# Patient Record
Sex: MALE | Race: WHITE | Employment: UNEMPLOYED | URBAN - METROPOLITAN AREA
[De-identification: names, ages, dates, MRNs, and addresses within clinical notes are randomized per-mention and may not be internally consistent; named-entity substitution may affect disease eponyms.]

---

## 2017-01-04 ENCOUNTER — OFFICE VISIT (OUTPATIENT)
Dept: FAMILY MEDICINE CLINIC | Age: 52
End: 2017-01-04

## 2017-01-04 VITALS
TEMPERATURE: 97.7 F | HEART RATE: 96 BPM | DIASTOLIC BLOOD PRESSURE: 84 MMHG | HEIGHT: 68 IN | WEIGHT: 280 LBS | BODY MASS INDEX: 42.44 KG/M2 | SYSTOLIC BLOOD PRESSURE: 132 MMHG | RESPIRATION RATE: 16 BRPM

## 2017-01-04 DIAGNOSIS — M54.50 CHRONIC LEFT-SIDED LOW BACK PAIN WITHOUT SCIATICA: Primary | ICD-10-CM

## 2017-01-04 DIAGNOSIS — H92.01 EAR PAIN, RIGHT: ICD-10-CM

## 2017-01-04 DIAGNOSIS — G89.29 CHRONIC LEFT-SIDED LOW BACK PAIN WITHOUT SCIATICA: Primary | ICD-10-CM

## 2017-01-04 RX ORDER — OXYCODONE AND ACETAMINOPHEN 5; 325 MG/1; MG/1
1 TABLET ORAL
Qty: 21 TAB | Refills: 0 | Status: SHIPPED | OUTPATIENT
Start: 2017-01-04 | End: 2019-09-05

## 2017-01-04 NOTE — PATIENT INSTRUCTIONS
Back Care and Preventing Injuries: Care Instructions  Your Care Instructions  You can hurt your back doing many everyday activities: lifting a heavy box, bending down to garden, exercising at the gym, and even getting out of bed. But you can keep your back strong and healthy by doing some exercises. You also can follow a few tips for sitting, sleeping, and lifting to avoid hurting your back again. Talk to your doctor before you start an exercise program. Ask for help if you want to learn more about keeping your back healthy. Follow-up care is a key part of your treatment and safety. Be sure to make and go to all appointments, and call your doctor if you are having problems. It's also a good idea to know your test results and keep a list of the medicines you take. How can you care for yourself at home? · Stay at a healthy weight to avoid strain on your lower back. · Do not smoke. Smoking increases the risk of osteoporosis, which weakens the spine. If you need help quitting, talk to your doctor about stop-smoking programs and medicines. These can increase your chances of quitting for good. · Make sure you sleep in a position that maintains your back's normal curves and on a mattress that feels comfortable. Sleep on your side with a pillow between your knees, or sleep on your back with a pillow under your knees. These positions can reduce strain on your back. · When you get out of bed, lie on your side and bend both knees. Drop your feet over the edge of the bed as you push up with both arms. Scoot to the edge of the bed. Make sure your feet are in line with your rear end (buttocks), and then stand up. · If you must stand for a long time, put one foot on a stool, ledge, or box. Exercise to strengthen your back and other muscles  · Get at least 30 minutes of exercise on most days of the week. Walking is a good choice.  You also may want to do other activities, such as running, swimming, cycling, or playing tennis or team sports. · Stretch your back muscles. Here are few exercises to try:  [employer]ali [employer] on your back with your knees bent and your feet flat on the floor. Gently pull one bent knee to your chest. Put that foot back on the floor, and then pull the other knee to your chest. Hold for 15 to 30 seconds. Repeat 2 to 4 times. ¨ Do pelvic tilts. Lie on your back with your knees bent. Tighten your stomach muscles. Pull your belly button (navel) in and up toward your ribs. You should feel like your back is pressing to the floor and your hips and pelvis are slightly lifting off the floor. Hold for 6 seconds while breathing smoothly. · Keep your core muscles strong. The muscles of your back, belly (abdomen), and buttocks support your spine. ¨ Pull in your belly, and imagine pulling your navel toward your spine. Hold this for 6 seconds, then relax. Remember to keep breathing normally as you tense your muscles. ¨ Do curl-ups. Always do them with your knees bent. Keep your low back on the floor, and curl your shoulders toward your knees using a smooth, slow motion. Keep your arms folded across your chest. If this bothers your neck, try putting your hands behind your neck (not your head), with your elbows spread apart. ¨ Lie on your back with your knees bent and your feet flat on the floor. Tighten your belly muscles, and then push with your feet and raise your buttocks up a few inches. Hold this position 6 seconds as you continue to breathe normally, then lower yourself slowly to the floor. Repeat 8 to 12 times. ¨ If you like group exercise, try Pilates or yoga. These classes have poses that strengthen the core muscles. Protect your back when you sit  · Place a small pillow, a rolled-up towel, or a lumbar roll in the curve of your back if you need extra support. · Sit in a chair that is low enough to let you place both feet flat on the floor with both knees nearly level with your hips.  If your chair or desk is too high, use a foot rest to raise your knees. · When driving, keep your knees nearly level with your hips. Sit straight, and drive with both hands on the steering wheel. Your arms should be in a slightly bent position. · Try a kneeling chair, which helps tilt your hips forward. This takes pressure off your lower back. · Try sitting on an exercise ball. It can rock from side to side, which helps keep your back loose. Lift properly  · Squat down, bending at the hips and knees only. If you need to, put one knee to the floor and extend your other knee in front of you, bent at a right angle (half kneeling). · Press your chest straight forward. This helps keep your upper back straight while keeping a slight arch in your low back. · Hold the load as close to your body as possible, at the level of your navel. · Use your feet to change direction, taking small steps. · Lead with your hips as you change direction. Keep your shoulders in line with your hips as you move. Do not twist your body. · Set down your load carefully, squatting with your knees and hips only. When should you call for help? Watch closely for changes in your health, and be sure to contact your doctor if:  · You want more exercises to make your back and other core muscles stronger. Where can you learn more? Go to http://andreia-liliane.info/. Enter S810 in the search box to learn more about \"Back Care and Preventing Injuries: Care Instructions. \"  Current as of: May 23, 2016  Content Version: 11.1  © 5451-8518 [employer]. Care instructions adapted under license by [employer] (which disclaims liability or warranty for this information). If you have questions about a medical condition or this instruction, always ask your healthcare professional. [address] any warranty or liability for your use of this information.        Earache: Care Instructions  Your Care Instructions  Even though infection is a common cause of ear pain, not all ear pain means an infection. If you have ear pain and don't have an infection, it could be because of a jaw problem, such as temporomandibular joint (TMJ) pain. Or it could be because of a neck problem. When ear discomfort or pain is mild or comes and goes without other symptoms, home treatment may be all you need. Follow-up care is a key part of your treatment and safety. Be sure to make and go to all appointments, and call your doctor if you are having problems. It's also a good idea to know your test results and keep a list of the medicines you take. How can you care for yourself at home? · Apply heat on the ear to ease pain. To apply heat, put a warm water bottle, a heating pad set on low, or a warm cloth on your ear. Do not go to sleep with a heating pad on your skin. · Take an over-the-counter pain medicine, such as acetaminophen (Tylenol), ibuprofen (Advil, Motrin), or naproxen (Aleve). Be safe with medicines. Read and follow all instructions on the label. · Do not take two or more pain medicines at the same time unless the doctor told you to. Many pain medicines have acetaminophen, which is Tylenol. Too much acetaminophen (Tylenol) can be harmful. · Never insert anything, such as a cotton swab or a gee pin, into the ear. When should you call for help? Call your doctor now or seek immediate medical care if:  · You have a fever. · You feel a lump in your neck or jaw. · The area around the ear starts to swell. · There is pus or blood draining from the ear. · There is new or different drainage from the ear. Watch closely for changes in your health, and be sure to contact your doctor if:  · Your pain gets worse. · You do not get better as expected. Where can you learn more? Go to http://andreia-liliane.info/. Enter G736 in the search box to learn more about \"Earache: Care Instructions. \"  Current as of: July 29, 2016  Content Version: 11.1  © 8085-9023 [employer]. Care instructions adapted under license by [employer] (which disclaims liability or warranty for this information). If you have questions about a medical condition or this instruction, always ask your healthcare professional. [address] any warranty or liability for your use of this information.

## 2017-01-04 NOTE — PROGRESS NOTES
Chief Complaint   Patient presents with    Ear Pain    Back Pain           HPI:     A 55 y/o male patient presents with complaints of mild to moderate right ear pain x 6 months, on and off. Denies ear trauma, recent travels, swimming, diving. Denies ear discharge, tinnitus, headache, dizziness, sore throat, dental problem, TMJ involvement, jaw pain. Also states left sided low back back pain has not completely gone away. NSAIDs not helping. States Percocet helped with the pain. Wants pain management referral. Was seen by orthopedic surgeon and was told that the left hip prosthesis is doing well and that the left low back pain is not related to the surgery. Patient states aggravation of pain when sitting down. States walking also aggravates pain. Denies pain radiation, numbness, tingling sensation. ROS:    Negative except that mentioned in HPI.     Physical Exam:    Vital Signs:   Visit Vitals    /84    Pulse 96    Temp 97.7 °F (36.5 °C) (Oral)    Resp 16    Ht 5' 8\" (1.727 m)    Wt 280 lb (127 kg)    BMI 42.57 kg/m2         Constitutional: a, a & o x 3, uncomfortable, no acute distress, interacting appropriately  HEENT: Head normocephalic, atraumatic, no conjuctival pallor or erythema, PERRLA, EOMI, ear canals clear with no erythema, swelling, excoriation or excessive cerumen, TM intact and non-bulging, no sinus tenderness, pharynx and tonsils with no erythema, no exudates, no tonsillar swelling  Mouth: No gum swelling, no dental abscess noted, mouth opening normal  Face: no facial swelling   Neck: supple, symmetrical, no palpable mass, no thyromegaly  Respiratory: symmetrical chest expansion, lungs clear to auscultation bilaterally, no wheezes or crackles  CV: capillary refill < 2 sec, normal S1S2, regular rate and rhythm, no murmurs, no carotid bruits, no JVD, pulses palpable  Musculoskeletal: no palpable tenderness on right TMJ, no palpable tenderness on lumbar spine, no perilumbar tenderness, palpable tenderness on the left iliac crest, guarded gait    Skin: no pallor, warm and dry, no rashes, no bruises,  Lymph: no cervical lymphadenopathy      Assessment/Plan:    1. Chronic left-sided low back pain without sciatica    - REFERRAL TO PAIN MANAGEMENT  - oxyCODONE-acetaminophen (PERCOCET) 5-325 mg per tablet; Take 1 Tab by mouth every eight (8) hours as needed for Pain. Max Daily Amount: 3 Tabs. Dispense: 21 Tab; Refill: 0  - Informed patient that this is the last prescription for Percocet. Discussed risk of dependency. Will defer to pain management. 2. Ear pain, right    - no obvious or visible abnormality  - REFERRAL TO ENT-OTOLARYNGOLOGY    Additional Notes: Discussed today's diagnosis and treatment plans. Medication indications and adverse effects discussed. After Visit Summary: Provided and discussed printed patient instructions. Questions answered. Follow-up Disposition:  Return if symptoms worsen or fail to improve. Mabel Sarkar, ANP-BC  Adult Medicine  [facility]

## 2017-01-04 NOTE — PROGRESS NOTES
1. Have you been to the ER, urgent care clinic since your last visit? Hospitalized since your last visit? No    2. Have you seen or consulted any other health care providers outside of the [address] [place] [address] since your last visit? Include any pap smears or colon screening.  No

## 2017-01-04 NOTE — MR AVS SNAPSHOT
Visit Information Date & Time Provider Department Dept. Phone Encounter #  
 1/4/2017  4:45 PM [facility] [phone] [id number] Follow-up Instructions Return if symptoms worsen or fail to improve. Your Appointments 1/30/2017  8:30 AM  
Follow Up with Mabel Sarkar, ALEC Kiran 23 ([facility]) Appt Note: 3 mo f/u  
 [address] [facility] [address]  
  
   
 [address]  
  
    
 3/21/2017  8:30 AM  
XRAY Visit with Luiz [facility]. Ilan Jesus 98 ([facility]) Appt Note: Return in about 6 months (around 3/20/2017) for KUB prior . [address] [facility] [address] [facility] [id number]  
  
    
 3/21/2017  8:45 AM  
ESTABLISHED PATIENT with Theresa Serrato MD  
[facility]. Ilan Jesus 98 [facility]) Appt Note: Return in about 6 months (around 3/20/2017) for KUB prior . [address] [facility] [address]  
[phone]  
  
   
 [facility] [id number] Upcoming Health Maintenance Date Due  
 EYE EXAM RETINAL OR DILATED Q1 1/23/1977 DTaP/Tdap/Td series (1 - Tdap) 5/12/2012 LIPID PANEL Q1 12/17/2016 HEMOGLOBIN A1C Q6M 1/21/2017 MICROALBUMIN Q1 2/8/2017 FOOT EXAM Q1 10/28/2017 Allergies as of 1/4/2017  Review Complete On: 1/4/2017 By: Thierry Braga LPN Severity Noted Reaction Type Reactions Medrol [Methylprednisolone] High 01/20/2016    Anaphylaxis Other reaction(s): other/intolerance 
tachycardia Patient states he had a severe allergic reaction about 15 years ago. Hydrocodone-acetaminophen Low 03/25/2016    Rash Current Immunizations  Reviewed on 1/4/2017 Name Date Influenza Vaccine 12/13/2015 Influenza Vaccine (Quad) PF 11/11/2016 Pneumococcal Polysaccharide (PPSV-23) 1/1/2016 Td 5/11/2012 Reviewed by Aundrea Rivers LPN on 9/8/6243 at  0:49 PM  
You Were Diagnosed With   
  
 Codes Comments Chronic left-sided low back pain without sciatica    -  Primary ICD-10-CM: M54.5, G89.29 ICD-9-CM: 724.2, 338.29 Ear pain, right     ICD-10-CM: H92.01 
ICD-9-CM: 388.70 Vitals BP Pulse Temp Resp Height(growth percentile) Weight(growth percentile) 132/84 96 97.7 °F (36.5 °C) (Oral) 16 5' 8\" (1.727 m) 280 lb (127 kg) BMI Smoking Status 42.57 kg/m2 Never Smoker Vitals History BMI and BSA Data Body Mass Index Body Surface Area 42.57 kg/m 2 2.47 m 2 Preferred Pharmacy Pharmacy Name Phone [id number], [address] [place] [address] [phone] Your Updated Medication List  
  
   
This list is accurate as of: 1/4/17  5:19 PM.  Always use your most recent med list.  
  
  
  
  
 aspirin 81 mg chewable tablet Take 1 Tab by mouth daily. celecoxib 200 mg capsule Commonly known as:  CELEBREX Take 1 Cap by mouth daily as needed. cyclobenzaprine 10 mg tablet Commonly known as:  FLEXERIL Take 1 Tab by mouth three (3) times daily as needed for Muscle Spasm(s). Indications: MUSCLE SPASM  
  
 lisinopril 10 mg tablet Commonly known as:  Doug Economy Take 1 Tab by mouth daily. LORazepam 0.5 mg tablet Commonly known as:  ATIVAN Take 1 Tab by mouth every eight (8) hours as needed for Anxiety. Max Daily Amount: 1.5 mg. Indications: ANXIETY  
  
 meloxicam 7.5 mg tablet Commonly known as:  MOBIC Take 1 Tab by mouth nightly as needed. oxyCODONE-acetaminophen 5-325 mg per tablet Commonly known as:  PERCOCET Take 1 Tab by mouth every eight (8) hours as needed for Pain. Max Daily Amount: 3 Tabs. VOLTAREN 1 % Gel Generic drug:  diclofenac Apply  to affected area every six (6) hours. Prescriptions Printed Refills  
 oxyCODONE-acetaminophen (PERCOCET) 5-325 mg per tablet 0 Sig: Take 1 Tab by mouth every eight (8) hours as needed for Pain. Max Daily Amount: 3 Tabs. Class: Print Route: Oral  
  
Follow-up Instructions Return if symptoms worsen or fail to improve. Referral Information Referral ID Referred By Referred To  
  
 [id number] Chrissywinston MOMIN Not Available Visits Status Start Date End Date 1 New Request 1/4/17 1/4/18 If your referral has a status of pending review or denied, additional information will be sent to support the outcome of this decision. Referral ID Referred By Referred To  
 [id number] Chrissywinston Humphrey LILIANE Not Available Visits Status Start Date End Date 1 New Request 1/4/17 1/4/18 If your referral has a status of pending review or denied, additional information will be sent to support the outcome of this decision. Patient Instructions Back Care and Preventing Injuries: Care Instructions Your Care Instructions You can hurt your back doing many everyday activities: lifting a heavy box, bending down to garden, exercising at the gym, and even getting out of bed. But you can keep your back strong and healthy by doing some exercises. You also can follow a few tips for sitting, sleeping, and lifting to avoid hurting your back again. Talk to your doctor before you start an exercise program. Ask for help if you want to learn more about keeping your back healthy. Follow-up care is a key part of your treatment and safety. Be sure to make and go to all appointments, and call your doctor if you are having problems. It's also a good idea to know your test results and keep a list of the medicines you take. How can you care for yourself at home? · Stay at a healthy weight to avoid strain on your lower back. · Do not smoke.  Smoking increases the risk of osteoporosis, which weakens the spine. If you need help quitting, talk to your doctor about stop-smoking programs and medicines. These can increase your chances of quitting for good. · Make sure you sleep in a position that maintains your back's normal curves and on a mattress that feels comfortable. Sleep on your side with a pillow between your knees, or sleep on your back with a pillow under your knees. These positions can reduce strain on your back. · When you get out of bed, lie on your side and bend both knees. Drop your feet over the edge of the bed as you push up with both arms. Scoot to the edge of the bed. Make sure your feet are in line with your rear end (buttocks), and then stand up. · If you must stand for a long time, put one foot on a stool, ledge, or box. Exercise to strengthen your back and other muscles · Get at least 30 minutes of exercise on most days of the week. Walking is a good choice. You also may want to do other activities, such as running, swimming, cycling, or playing tennis or team sports. · Stretch your back muscles. Here are few exercises to try: ¨ Lie on your back with your knees bent and your feet flat on the floor. Gently pull one bent knee to your chest. Put that foot back on the floor, and then pull the other knee to your chest. Hold for 15 to 30 seconds. Repeat 2 to 4 times. ¨ Do pelvic tilts. Lie on your back with your knees bent. Tighten your stomach muscles. Pull your belly button (navel) in and up toward your ribs. You should feel like your back is pressing to the floor and your hips and pelvis are slightly lifting off the floor. Hold for 6 seconds while breathing smoothly. · Keep your core muscles strong. The muscles of your back, belly (abdomen), and buttocks support your spine. ¨ Pull in your belly, and imagine pulling your navel toward your spine. Hold this for 6 seconds, then relax. Remember to keep breathing normally as you tense your muscles. ¨ Do curl-ups. Always do them with your knees bent. Keep your low back on the floor, and curl your shoulders toward your knees using a smooth, slow motion. Keep your arms folded across your chest. If this bothers your neck, try putting your hands behind your neck (not your head), with your elbows spread apart. ¨ Lie on your back with your knees bent and your feet flat on the floor. Tighten your belly muscles, and then push with your feet and raise your buttocks up a few inches. Hold this position 6 seconds as you continue to breathe normally, then lower yourself slowly to the floor. Repeat 8 to 12 times. ¨ If you like group exercise, try Pilates or yoga. These classes have poses that strengthen the core muscles. Protect your back when you sit · Place a small pillow, a rolled-up towel, or a lumbar roll in the curve of your back if you need extra support. · Sit in a chair that is low enough to let you place both feet flat on the floor with both knees nearly level with your hips. If your chair or desk is too high, use a foot rest to raise your knees. · When driving, keep your knees nearly level with your hips. Sit straight, and drive with both hands on the steering wheel. Your arms should be in a slightly bent position. · Try a kneeling chair, which helps tilt your hips forward. This takes pressure off your lower back. · Try sitting on an exercise ball. It can rock from side to side, which helps keep your back loose. Lift properly · Squat down, bending at the hips and knees only. If you need to, put one knee to the floor and extend your other knee in front of you, bent at a right angle (half kneeling). · Press your chest straight forward. This helps keep your upper back straight while keeping a slight arch in your low back. · Hold the load as close to your body as possible, at the level of your navel. · Use your feet to change direction, taking small steps. · Lead with your hips as you change direction. Keep your shoulders in line with your hips as you move. Do not twist your body. · Set down your load carefully, squatting with your knees and hips only. When should you call for help? Watch closely for changes in your health, and be sure to contact your doctor if: 
· You want more exercises to make your back and other core muscles stronger. Where can you learn more? Go to http://andreia-liliane.info/. Enter S810 in the search box to learn more about \"Back Care and Preventing Injuries: Care Instructions. \" Current as of: May 23, 2016 Content Version: 11.1 © 1795-2614 [employer]. Care instructions adapted under license by [employer] (which disclaims liability or warranty for this information). If you have questions about a medical condition or this instruction, always ask your healthcare professional. [place] [address] any warranty or liability for your use of this information. Earache: Care Instructions Your Care Instructions Even though infection is a common cause of ear pain, not all ear pain means an infection. If you have ear pain and don't have an infection, it could be because of a jaw problem, such as temporomandibular joint (TMJ) pain. Or it could be because of a neck problem. When ear discomfort or pain is mild or comes and goes without other symptoms, home treatment may be all you need. Follow-up care is a key part of your treatment and safety. Be sure to make and go to all appointments, and call your doctor if you are having problems. It's also a good idea to know your test results and keep a list of the medicines you take. How can you care for yourself at home? · Apply heat on the ear to ease pain. To apply heat, put a warm water bottle, a heating pad set on low, or a warm cloth on your ear. Do not go to sleep with a heating pad on your skin. · Take an over-the-counter pain medicine, such as acetaminophen (Tylenol), ibuprofen (Advil, Motrin), or naproxen (Aleve). Be safe with medicines. Read and follow all instructions on the label. · Do not take two or more pain medicines at the same time unless the doctor told you to. Many pain medicines have acetaminophen, which is Tylenol. Too much acetaminophen (Tylenol) can be harmful. · Never insert anything, such as a cotton swab or a gee pin, into the ear. When should you call for help? Call your doctor now or seek immediate medical care if: 
· You have a fever. · You feel a lump in your neck or jaw. · The area around the ear starts to swell. · There is pus or blood draining from the ear. · There is new or different drainage from the ear. Watch closely for changes in your health, and be sure to contact your doctor if: 
· Your pain gets worse. · You do not get better as expected. Where can you learn more? Go to http://andreia-liliane.info/. Enter X944 in the search box to learn more about \"Earache: Care Instructions. \" Current as of: July 29, 2016 Content Version: 11.1 © 6233-9718 [employer]. Care instructions adapted under license by [employer] (which disclaims liability or warranty for this information). If you have questions about a medical condition or this instruction, always ask your healthcare professional. [place] [address] any warranty or liability for your use of this information. Introducing [facility] [place] [facility]! Dear Nasreen Avilez: Thank you for requesting a iovation account. Our records indicate that you already have an active iovation account. You can access your account anytime at [email or web address] Did you know that you can access your hospital and ER discharge instructions at any time in iovation? You can also review all of your test results from your hospital stay or ER visit. Additional Information If you have questions, please visit the Frequently Asked Questions section of the AppRedeemt website at [email or web address] Remember, ALOSKO is NOT to be used for urgent needs. For medical emergencies, dial 911. Now available from your iPhone and Android! Please provide this summary of care documentation to your next provider. Your primary care clinician is listed as Colin Donahue. If you have any questions after today's visit, please call [phone].

## 2017-02-17 ENCOUNTER — OFFICE VISIT (OUTPATIENT)
Dept: FAMILY MEDICINE CLINIC | Age: 52
End: 2017-02-17

## 2017-02-17 VITALS
BODY MASS INDEX: 41.52 KG/M2 | HEART RATE: 100 BPM | HEIGHT: 68 IN | WEIGHT: 274 LBS | SYSTOLIC BLOOD PRESSURE: 109 MMHG | RESPIRATION RATE: 16 BRPM | DIASTOLIC BLOOD PRESSURE: 88 MMHG | TEMPERATURE: 96 F

## 2017-02-17 DIAGNOSIS — F41.9 ANXIETY: ICD-10-CM

## 2017-02-17 DIAGNOSIS — K52.9 GASTROENTERITIS: Primary | ICD-10-CM

## 2017-02-17 RX ORDER — LORAZEPAM 0.5 MG/1
0.5 TABLET ORAL
Qty: 30 TAB | Refills: 1 | Status: SHIPPED | OUTPATIENT
Start: 2017-02-17 | End: 2019-08-23 | Stop reason: ALTCHOICE

## 2017-02-17 RX ORDER — GABAPENTIN 300 MG/1
300 CAPSULE ORAL 3 TIMES DAILY
COMMUNITY
Start: 2017-01-31 | End: 2017-12-18 | Stop reason: SDUPTHER

## 2017-02-17 RX ORDER — LOPERAMIDE HCL 2 MG
2 TABLET ORAL
Qty: 20 TAB | Refills: 0 | Status: SHIPPED | OUTPATIENT
Start: 2017-02-17 | End: 2017-02-27

## 2017-02-17 NOTE — TELEPHONE ENCOUNTER
Patient here now seeing Steven Jauregui for an acute appointment.     Last appt was 10-28-16  Next appt is None shceduled

## 2017-02-17 NOTE — MR AVS SNAPSHOT
Visit Information Date & Time Provider Department Dept. Phone Encounter #  
 2/17/2017  9:45 AM Jay Craig [id number] Your Appointments 3/21/2017  8:30 AM  
XRAY Visit with Radhames Jansen [facility]. Ilan Jesus [address] ([facility]) Appt Note: Return in about 6 months (around 3/20/2017) for KUB prior . [address]  
  
   
 [facility] [id number]  
  
    
 3/21/2017  8:45 AM  
ESTABLISHED PATIENT with Navya Shaw MD  
[facility]. Ilan Jesus 98 [facility]) Appt Note: Return in about 6 months (around 3/20/2017) for KUB prior . [address] [address]  
[phone]  
  
   
 [place] [id number] Upcoming Health Maintenance Date Due  
 EYE EXAM RETINAL OR DILATED Q1 1/23/1977 DTaP/Tdap/Td series (1 - Tdap) 5/12/2012 LIPID PANEL Q1 12/17/2016 HEMOGLOBIN A1C Q6M 1/21/2017 FOBT Q 1 YEAR AGE 50-75 1/23/2017 MICROALBUMIN Q1 2/8/2017 FOOT EXAM Q1 10/28/2017 Allergies as of 2/17/2017  Review Complete On: 2/17/2017 By: Faby Dela Cruz LPN Severity Noted Reaction Type Reactions Medrol [Methylprednisolone] High 08/07/2012    Anaphylaxis, Other (comments)  
 tachycardia Other reaction(s): other/intolerance 
tachycardia Patient states he had a severe allergic reaction about 15 years ago. Hydrocodone-acetaminophen Low 08/07/2012    Rash Current Immunizations  Reviewed on 2/17/2017 Name Date Influenza Vaccine 12/13/2015 Influenza Vaccine (Quad) PF 11/11/2016 Pneumococcal Polysaccharide (PPSV-23) 1/1/2016 Td 5/11/2012 Reviewed by Faby Dela Cruz LPN on 2/96/9415 at 61:04 AM  
You Were Diagnosed With   
  
 Codes Comments Gastroenteritis    -  Primary ICD-10-CM: K52.9 ICD-9-CM: 558. 9 Vitals BP Pulse Temp Resp Height(growth percentile) Weight(growth percentile) 109/88 100 96 °F (35.6 °C) (Oral) 16 5' 8\" (1.727 m) 274 lb (124.3 kg) BMI Smoking Status 41.66 kg/m2 Never Smoker Vitals History BMI and BSA Data Body Mass Index Body Surface Area  
 41.66 kg/m 2 2.44 m 2 Preferred Pharmacy Pharmacy Name Phone [facility], [address] [place] [address] [phone] Your Updated Medication List  
  
   
This list is accurate as of: 2/17/17 10:29 AM.  Always use your most recent med list.  
  
  
  
  
 aspirin 81 mg chewable tablet Take 1 Tab by mouth daily. celecoxib 200 mg capsule Commonly known as:  CELEBREX Take 1 Cap by mouth daily as needed. cyclobenzaprine 10 mg tablet Commonly known as:  FLEXERIL Take 1 Tab by mouth three (3) times daily as needed for Muscle Spasm(s). Indications: MUSCLE SPASM  
  
 gabapentin 300 mg capsule Commonly known as:  NEURONTIN Take 300 mg by mouth three (3) times daily. lisinopril 10 mg tablet Commonly known as:  Peguero Edman Take 1 Tab by mouth daily. loperamide 2 mg tablet Commonly known as:  IMODIUM A-D Take 1 Tab by mouth four (4) times daily as needed for Diarrhea for up to 10 days. LORazepam 0.5 mg tablet Commonly known as:  ATIVAN Take 1 Tab by mouth every eight (8) hours as needed for Anxiety. Max Daily Amount: 1.5 mg. Indications: ANXIETY  
  
 meloxicam 7.5 mg tablet Commonly known as:  MOBIC Take 1 Tab by mouth nightly as needed. oxyCODONE-acetaminophen 5-325 mg per tablet Commonly known as:  PERCOCET Take 1 Tab by mouth every eight (8) hours as needed for Pain. Max Daily Amount: 3 Tabs. VOLTAREN 1 % Gel Generic drug:  diclofenac Apply  to affected area every six (6) hours. Prescriptions Sent to Pharmacy  Refills  
 loperamide (IMODIUM A-D) 2 mg tablet 0  
 Sig: Take 1 Tab by mouth four (4) times daily as needed for Diarrhea for up to 10 days. Class: Normal  
 Pharmacy: [facility], [address] [place] [address] #: [phone] Route: Oral  
  
Patient Instructions Gastroenteritis: Care Instructions Your Care Instructions Gastroenteritis is an illness that may cause nausea, vomiting, and diarrhea. It is sometimes called \"stomach flu. \" It can be caused by bacteria or a virus. You will probably begin to feel better in 1 to 2 days. In the meantime, get plenty of rest and make sure you do not become dehydrated. Dehydration occurs when your body loses too much fluid. Follow-up care is a key part of your treatment and safety. Be sure to make and go to all appointments, and call your doctor if you are having problems. Its also a good idea to know your test results and keep a list of the medicines you take. How can you care for yourself at home? · If your doctor prescribed antibiotics, take them as directed. Do not stop taking them just because you feel better. You need to take the full course of antibiotics. · Drink plenty of fluids to prevent dehydration, enough so that your urine is light yellow or clear like water. Choose water and other caffeine-free clear liquids until you feel better. If you have kidney, heart, or liver disease and have to limit fluids, talk with your doctor before you increase your fluid intake. · Drink fluids slowly, in frequent, small amounts, because drinking too much too fast can cause vomiting. · Begin eating mild foods, such as dry toast, yogurt, applesauce, bananas, and rice. Avoid spicy, hot, or high-fat foods, and do not drink alcohol or caffeine for a day or two. Do not drink milk or eat ice cream until you are feeling better. How to prevent gastroenteritis · Keep hot foods hot and cold foods cold.  
· Do not eat meats, dressings, salads, or other foods that have been kept at room temperature for more than 2 hours. · Use a thermometer to check your refrigerator. It should be between 34°F and 40°F. 
· Defrost meats in the refrigerator or microwave, not on the kitchen counter. · Keep your hands and your kitchen clean. Wash your hands, cutting boards, and countertops with hot soapy water frequently. · Cook meat until it is well done. · Do not eat raw eggs or uncooked sauces made with raw eggs. · Do not take chances. If food looks or tastes spoiled, throw it out. When should you call for help? Call 911 anytime you think you may need emergency care. For example, call if: 
· You vomit blood or what looks like coffee grounds. · You passed out (lost consciousness). · You pass maroon or very bloody stools. Call your doctor now or seek immediate medical care if: 
· You have severe belly pain. · You have signs of needing more fluids. You have sunken eyes, a dry mouth, and pass only a little dark urine. · You feel like you are going to faint. · You have increased belly pain that does not go away in 1 to 2 days. · You have new or increased nausea, or you are vomiting. · You have a new or higher fever. · Your stools are black and tarlike or have streaks of blood. Watch closely for changes in your health, and be sure to contact your doctor if: 
· You are dizzy or lightheaded. · You urinate less than usual, or your urine is dark yellow or brown. · You do not feel better with each day that goes by. Where can you learn more? Go to http://andreia-liliane.info/. Enter N142 in the search box to learn more about \"Gastroenteritis: Care Instructions. \" Current as of: May 24, 2016 Content Version: 11.1 © 2819-2100 [email or web address]. Care instructions adapted under license by [employer] (which disclaims liability or warranty for this information).  If you have questions about a medical condition or this instruction, always ask your healthcare professional. [address] any warranty or liability for your use of this information. Food Poisoning: Care Instructions Your Care Instructions Food poisoning occurs when you eat foods that contain harmful germs. Food can be contaminated while it is growing, during processing, or when it is prepared. Fresh fruits and vegetables also can be contaminated if they are washed in contaminated water. You may have become ill after eating undercooked meat or eggs or other unsafe foods. Cooking foods thoroughly and storing them properly can help prevent food poisoning. There are many types of food poisoning. Your symptoms depend on the type of food poisoning you have. You will probably begin to feel better in 1 or 2 days. In the meantime, get plenty of rest and make sure that you do not become dehydrated. Follow-up care is a key part of your treatment and safety. Be sure to make and go to all appointments, and call your doctor if you are having problems. Its also a good idea to know your test results and keep a list of the medicines you take. How can you care for yourself at home? · To prevent dehydration, drink plenty of fluids, enough so that your urine is light yellow or clear like water. Choose water and other caffeine-free clear liquids until you feel better. If you have kidney, heart, or liver disease and have to limit fluids, talk with your doctor before you increase the amount of fluids you drink. · Begin eating small amounts of mild, low-fat foods, depending on how you feel. Try foods like rice, dry crackers, bananas, and applesauce. ¨ Avoid spicy foods, alcohol, and coffee until 48 hours after all symptoms have disappeared. ¨ Avoid chewing gum that contains sorbitol. ¨ Avoid dairy products for 3 days after symptoms disappear. · Take your medicines as prescribed.  Call your doctor if you think you are having a problem with your medicine. You will get more details on the specific medicines your doctor prescribes. To prevent food poisoning · Keep hot foods hot and cold foods cold. · Do not eat meats, dressings, salads, or other foods that have been kept at room temperature for more than 2 hours. · Use a thermometer to check your refrigerator. It should be between 34°F and 40°F. 
· Defrost meats in the refrigerator or microwave, not on the kitchen counter. · Keep your hands and your kitchen clean. Wash your hands, cutting boards, and countertops with hot, soapy water. If you use the same cutting board for chopping vegetables and preparing raw meat, be sure to wash the cutting board with hot, soapy water between each use. · Cook meat until it is well done. · Do not eat raw eggs or uncooked dough or sauces made with raw eggs. · Do not take chances. If you think food looks or tastes spoiled, throw it out. When should you call for help? Call 911 anytime you think you may need emergency care. For example, call if: 
· You have sudden, severe belly pain. · You passed out (lost consciousness). · You vomit blood or what looks like coffee grounds. · You pass maroon or very bloody stools. Call your doctor now or seek immediate medical care if: 
· You have signs of needing more fluids. You have sunken eyes and a dry mouth, and you pass only a little dark urine. · Weakness in your legs makes it hard to stand or walk. · You have swelling in your hands, face, or feet. · You have trouble breathing. · You are dizzy or lightheaded, or you feel like you may faint. · Your stools are black and tarlike or have streaks of blood. · You have numbness and tingling in your hands or feet. · You have new nausea or vomiting. · You have new or increased belly pain. · Your joints ache. Watch closely for changes in your health, and be sure to contact your doctor if: 
· Your vomiting is not getting better after 1 to 2 days. · Your diarrhea is not getting better after 3 days. Where can you learn more? Go to http://andreia-liliane.info/. Enter I771 in the search box to learn more about \"Food Poisoning: Care Instructions. \" Current as of: May 24, 2016 Content Version: 11.1 © 3741-7624 [employer]. Care instructions adapted under license by [employer] (which disclaims liability or warranty for this information). If you have questions about a medical condition or this instruction, always ask your healthcare professional. [address] any warranty or liability for your use of this information. Introducing [facility]! Dear Audrey Ford: Thank you for requesting a [employer] account. Our records indicate that you already have an active [employer] account. You can access your account anytime at [email or web address]. [email or web address] Did you know that you can access your hospital and ER discharge instructions at any time in [employer]? You can also review all of your test results from your hospital stay or ER visit. Additional Information If you have questions, please visit the Frequently Asked Questions section of the [employer] website at [email or web address] Remember, [employer] is NOT to be used for urgent needs. For medical emergencies, dial 911. Now available from your iPhone and Android! Please provide this summary of care documentation to your next provider. Your primary care clinician is listed as Colin Donahue. If you have any questions after today's visit, please call [phone].

## 2017-02-17 NOTE — PROGRESS NOTES
1. Have you been to the ER, urgent care clinic since your last visit? Hospitalized since your last visit? No    2. Have you seen or consulted any other health care providers outside of the [address] [place] [address] since your last visit? Include any pap smears or colon screening.  Yes Where: 2/8/2017 Reason for visit: Pain Management

## 2017-02-17 NOTE — PATIENT INSTRUCTIONS
Gastroenteritis: Care Instructions  Your Care Instructions  Gastroenteritis is an illness that may cause nausea, vomiting, and diarrhea. It is sometimes called \"stomach flu. \" It can be caused by bacteria or a virus. You will probably begin to feel better in 1 to 2 days. In the meantime, get plenty of rest and make sure you do not become dehydrated. Dehydration occurs when your body loses too much fluid. Follow-up care is a key part of your treatment and safety. Be sure to make and go to all appointments, and call your doctor if you are having problems. Its also a good idea to know your test results and keep a list of the medicines you take. How can you care for yourself at home? · If your doctor prescribed antibiotics, take them as directed. Do not stop taking them just because you feel better. You need to take the full course of antibiotics. · Drink plenty of fluids to prevent dehydration, enough so that your urine is light yellow or clear like water. Choose water and other caffeine-free clear liquids until you feel better. If you have kidney, heart, or liver disease and have to limit fluids, talk with your doctor before you increase your fluid intake. · Drink fluids slowly, in frequent, small amounts, because drinking too much too fast can cause vomiting. · Begin eating mild foods, such as dry toast, yogurt, applesauce, bananas, and rice. Avoid spicy, hot, or high-fat foods, and do not drink alcohol or caffeine for a day or two. Do not drink milk or eat ice cream until you are feeling better. How to prevent gastroenteritis  · Keep hot foods hot and cold foods cold. · Do not eat meats, dressings, salads, or other foods that have been kept at room temperature for more than 2 hours. · Use a thermometer to check your refrigerator. It should be between 34°F and 40°F.  · Defrost meats in the refrigerator or microwave, not on the kitchen counter. · Keep your hands and your kitchen clean.  Wash your hands, cutting boards, and countertops with hot soapy water frequently. · Cook meat until it is well done. · Do not eat raw eggs or uncooked sauces made with raw eggs. · Do not take chances. If food looks or tastes spoiled, throw it out. When should you call for help? Call 911 anytime you think you may need emergency care. For example, call if:  · You vomit blood or what looks like coffee grounds. · You passed out (lost consciousness). · You pass maroon or very bloody stools. Call your doctor now or seek immediate medical care if:  · You have severe belly pain. · You have signs of needing more fluids. You have sunken eyes, a dry mouth, and pass only a little dark urine. · You feel like you are going to faint. · You have increased belly pain that does not go away in 1 to 2 days. · You have new or increased nausea, or you are vomiting. · You have a new or higher fever. · Your stools are black and tarlike or have streaks of blood. Watch closely for changes in your health, and be sure to contact your doctor if:  · You are dizzy or lightheaded. · You urinate less than usual, or your urine is dark yellow or brown. · You do not feel better with each day that goes by. Where can you learn more? Go to http://andreia-liliane.info/. Enter N142 in the search box to learn more about \"Gastroenteritis: Care Instructions. \"  Current as of: May 24, 2016  Content Version: 11.1  © 4760-5115 [employer]. Care instructions adapted under license by [employer] (which disclaims liability or warranty for this information). If you have questions about a medical condition or this instruction, always ask your healthcare professional. [place] [address] any warranty or liability for your use of this information. Food Poisoning: Care Instructions  Your Care Instructions  Food poisoning occurs when you eat foods that contain harmful germs.  Food can be contaminated while it is growing, during processing, or when it is prepared. Fresh fruits and vegetables also can be contaminated if they are washed in contaminated water. You may have become ill after eating undercooked meat or eggs or other unsafe foods. Cooking foods thoroughly and storing them properly can help prevent food poisoning. There are many types of food poisoning. Your symptoms depend on the type of food poisoning you have. You will probably begin to feel better in 1 or 2 days. In the meantime, get plenty of rest and make sure that you do not become dehydrated. Follow-up care is a key part of your treatment and safety. Be sure to make and go to all appointments, and call your doctor if you are having problems. Its also a good idea to know your test results and keep a list of the medicines you take. How can you care for yourself at home? · To prevent dehydration, drink plenty of fluids, enough so that your urine is light yellow or clear like water. Choose water and other caffeine-free clear liquids until you feel better. If you have kidney, heart, or liver disease and have to limit fluids, talk with your doctor before you increase the amount of fluids you drink. · Begin eating small amounts of mild, low-fat foods, depending on how you feel. Try foods like rice, dry crackers, bananas, and applesauce. ¨ Avoid spicy foods, alcohol, and coffee until 48 hours after all symptoms have disappeared. ¨ Avoid chewing gum that contains sorbitol. ¨ Avoid dairy products for 3 days after symptoms disappear. · Take your medicines as prescribed. Call your doctor if you think you are having a problem with your medicine. You will get more details on the specific medicines your doctor prescribes. To prevent food poisoning  · Keep hot foods hot and cold foods cold. · Do not eat meats, dressings, salads, or other foods that have been kept at room temperature for more than 2 hours. · Use a thermometer to check your refrigerator.  It should be between 34°F and 40°F.  · Defrost meats in the refrigerator or microwave, not on the kitchen counter. · Keep your hands and your kitchen clean. Wash your hands, cutting boards, and countertops with hot, soapy water. If you use the same cutting board for chopping vegetables and preparing raw meat, be sure to wash the cutting board with hot, soapy water between each use. · Cook meat until it is well done. · Do not eat raw eggs or uncooked dough or sauces made with raw eggs. · Do not take chances. If you think food looks or tastes spoiled, throw it out. When should you call for help? Call 911 anytime you think you may need emergency care. For example, call if:  · You have sudden, severe belly pain. · You passed out (lost consciousness). · You vomit blood or what looks like coffee grounds. · You pass maroon or very bloody stools. Call your doctor now or seek immediate medical care if:  · You have signs of needing more fluids. You have sunken eyes and a dry mouth, and you pass only a little dark urine. · Weakness in your legs makes it hard to stand or walk. · You have swelling in your hands, face, or feet. · You have trouble breathing. · You are dizzy or lightheaded, or you feel like you may faint. · Your stools are black and tarlike or have streaks of blood. · You have numbness and tingling in your hands or feet. · You have new nausea or vomiting. · You have new or increased belly pain. · Your joints ache. Watch closely for changes in your health, and be sure to contact your doctor if:  · Your vomiting is not getting better after 1 to 2 days. · Your diarrhea is not getting better after 3 days. Where can you learn more? Go to http://andreia-liliane.info/. Enter W197 in the search box to learn more about \"Food Poisoning: Care Instructions. \"  Current as of: May 24, 2016  Content Version: 11.1  © 4168-3680 [employer].  Care instructions adapted under license by [employer] (which disclaims liability or warranty for this information). If you have questions about a medical condition or this instruction, always ask your healthcare professional. [address] any warranty or liability for your use of this information.

## 2017-02-17 NOTE — PROGRESS NOTES
Chief Complaint   Patient presents with    Diarrhea     HPI:    This is a 49 y/o male patient who presents for the above symptom. No SOB, CP, dizziness, headache. Diarrhea started yesterday. Had BM up to 10x / yesterday. Have not had any today. No  symptoms. No blood in stool. Patient suspicious he may have had food poison. ROS: pertinent positive as noted in HPI. All others were negative. Past Medical History   Diagnosis Date    Abdominal pain     Adverse effect of anesthesia      brother- BP drops after surgery-difficult to wake    Back pain     Calculus of kidney     Diabetes ([address]      Type 2    Diabetes mellitus ([address]     Diverticulitis     Esophageal reflux     Hypertension     Kidney stone     Kidney stones     Microscopic hematuria     Osteoarthritis     Pneumonia     Renal calculus, right     Sepsis ([address]     Snores     Swallowing difficulty     Ureteral calculi     Ureteral stent retained     Urine leukocytes      Social History     Social History    Marital status:      Spouse name: N/A    Number of children: N/A    Years of education: N/A     Occupational History    Not on file. Social History Main Topics    Smoking status: Never Smoker    Smokeless tobacco: Never Used    Alcohol use No    Drug use: No    Sexual activity: Yes     Partners: Female     Other Topics Concern    Not on file     Social History Narrative     Current Outpatient Prescriptions   Medication Sig Dispense Refill    gabapentin (NEURONTIN) 300 mg capsule Take 300 mg by mouth three (3) times daily.  loperamide (IMODIUM A-D) 2 mg tablet Take 1 Tab by mouth four (4) times daily as needed for Diarrhea for up to 10 days. 20 Tab 0    oxyCODONE-acetaminophen (PERCOCET) 5-325 mg per tablet Take 1 Tab by mouth every eight (8) hours as needed for Pain. Max Daily Amount: 3 Tabs.  21 Tab 0    cyclobenzaprine (FLEXERIL) 10 mg tablet Take 1 Tab by mouth three (3) times daily as needed for Muscle Spasm(s). Indications: MUSCLE SPASM 30 Tab 0    VOLTAREN 1 % gel Apply  to affected area every six (6) hours. 4 g 1    aspirin 81 mg chewable tablet Take 1 Tab by mouth daily. 30 Tab 0    lisinopril (PRINIVIL, ZESTRIL) 10 mg tablet Take 1 Tab by mouth daily. 90 Tab 3    LORazepam (ATIVAN) 0.5 mg tablet Take 1 Tab by mouth every eight (8) hours as needed for Anxiety. Max Daily Amount: 1.5 mg. Indications: ANXIETY 30 Tab 1    meloxicam (MOBIC) 7.5 mg tablet Take 1 Tab by mouth nightly as needed.  celecoxib (CELEBREX) 200 mg capsule Take 1 Cap by mouth daily as needed. Allergies   Allergen Reactions    Medrol [Methylprednisolone] Anaphylaxis and Other (comments)     tachycardia  Other reaction(s): other/intolerance  tachycardia  Patient states he had a severe allergic reaction about 15 years ago.  Hydrocodone-Acetaminophen Rash       Physical Exam:    Vital Signs:     Visit Vitals    /88    Pulse 100    Temp 96 °F (35.6 °C) (Oral)    Resp 16    Ht 5' 8\" (1.727 m)    Wt 274 lb (124.3 kg)    BMI 41.66 kg/m2         General: a, a & o x 3, afebrile, interacting appropriately, in no acute distress  HEENT: head normocephalic and atraumatic, conjuctiva clear  Respiratory: lung sounds clear bilaterally,  good respiratory effort, no wheezes or crackles  Cardiovascular: normal S1S2, regular rate and rhythm, no murmurs  Abdomen: non-distended, normal bowel sounds x 4 quadrants, soft, non-tender to palpation      Assessment/Plan:      ICD-10-CM ICD-9-CM    1. Gastroenteritis K52.9 558.9 loperamide (IMODIUM A-D) 2 mg tablet    Plenty of fluid to keep hydrated. Avoid offending food. Additional Notes: Discussed today's diagnosis, treatment plans. Discussed medication indications and side effects. After Visit Summary: Provided and discussed printed patient instructions. Answered questions in relation to today's diagnosis.   Follow-up Disposition: Follow  as needed if symptoms worsen or fail to improve          Luda Hawthorne NP-BC  Family Medicine  [facility]          Orders Placed This Encounter    gabapentin (NEURONTIN) 300 mg capsule    loperamide (IMODIUM A-D) 2 mg tablet

## 2017-04-07 NOTE — TELEPHONE ENCOUNTER
Requested Prescriptions     Pending Prescriptions Disp Refills    lisinopril (PRINIVIL, ZESTRIL) 10 mg tablet 90 Tab 3     Sig: Take 1 Tab by mouth daily.

## 2017-04-10 RX ORDER — LISINOPRIL 10 MG/1
10 TABLET ORAL DAILY
Qty: 90 TAB | Refills: 3 | Status: SHIPPED | OUTPATIENT
Start: 2017-04-10 | End: 2017-11-09 | Stop reason: SDUPTHER

## 2017-10-18 ENCOUNTER — OFFICE VISIT (OUTPATIENT)
Dept: FAMILY MEDICINE CLINIC | Age: 52
End: 2017-10-18

## 2017-10-18 VITALS
DIASTOLIC BLOOD PRESSURE: 92 MMHG | HEART RATE: 108 BPM | RESPIRATION RATE: 16 BRPM | WEIGHT: 300 LBS | BODY MASS INDEX: 45.47 KG/M2 | SYSTOLIC BLOOD PRESSURE: 136 MMHG | OXYGEN SATURATION: 96 % | TEMPERATURE: 97.4 F | HEIGHT: 68 IN

## 2017-10-18 DIAGNOSIS — I10 UNCONTROLLED HYPERTENSION: Primary | ICD-10-CM

## 2017-10-18 NOTE — PROGRESS NOTES
Jeanne Miller    CC: Elevated BP    HPI:     HTN:  - Report taking his BP medication nightly without issue  - Does not check BP at home, but has been recently getting BP checked when surgical dressing is changed and it has been normal  - Has not been following a low sodium or DASH diet  - Has not been exercising 2/2 his chronic back pain  - Today at pain management clinic his BP was noted to be >200/>100.  - States today was his first day back to pain management. Surgeon had been temporarily managing his pain s/p colectomy due to diverticulitis  - Pain has not been controlled for the past 3 days, as he had no medication. Normally it is well controlled. Today he got his prescription for his pain medication.  - Prior to his visit to pain management he endorses doing a lot of physical activity (Toilet installation and yard work), which caused him to develop severe back pain.   - He also reports that his close friend (Age 41 yo)  recently and the  will be this Friday and Nephew is going to have major surgery.  - Notable pertinent PMH of HTN and chronic back pain    ROS: Positive items marked in RED  CON: fever, chills, fatigue  Eyes: itchy eyes, watery eyes, red eyes  ENT: rhinorrhea, sneezing, post nasal drip, sore throat  Cardiovascular: palpitations, CP  Resp: wheezing, SOB, hemoptysis  Lymph: swollen/enlarged lymph nodes, tender/painful lymph nodes  GI: nausea, vomiting, diarrhea (Today at office of   SKIN: rash, itching  : dysuria, hematuria  MS: arthralgias (Back), myalgias (Back)    Past Medical History:   Diagnosis Date    Abdominal pain     Adverse effect of anesthesia     brother- BP drops after surgery-difficult to wake    Back pain     Calculus of kidney     Chest pain     Diabetes ([address]     Type 2    Diabetes mellitus ([address]     Diverticulitis     Esophageal reflux     Hypertension     Kidney stone     Kidney stones     Microscopic hematuria     Osteoarthritis     Pneumonia     Renal calculus, right     Sepsis ([address]     Snores     Swallowing difficulty     Ureteral calculi     Ureteral stent retained     Urine leukocytes        Past Surgical History:   Procedure Laterality Date    HX CHOLECYSTECTOMY      2002    HX COLONOSCOPY  02.22.2016    HX HIP REPLACEMENT  08/04/2016    HX UROLOGICAL  06/06/2016    [facility]-Cystoscopy, CYSTOURETHROSCOPY W/ INDWELLING URETERAL STENT INSERTION, Dr Damion Mckeon HX UROLOGICAL  05/29/2016    [facility]- Cystoscopy, Right retrograde pyelography, Right 6 x 26 cm double-J ureteral stent placement,Intraoperative fluoro less than 1 hour,Interpretation of Urography, Dr Mireille Bowers          Family History   Problem Relation Age of Onset    Cancer Mother      Lung    Hypertension Mother     Stroke Mother     Cancer Father      Brain       Social History     Social History    Marital status:      Spouse name: N/A    Number of children: N/A    Years of education: N/A     Social History Main Topics    Smoking status: Never Smoker    Smokeless tobacco: Never Used    Alcohol use No    Drug use: No    Sexual activity: Yes     Partners: Female     Other Topics Concern    Not on file     Social History Narrative       Allergies   Allergen Reactions    Medrol [Methylprednisolone] Anaphylaxis and Other (comments)     tachycardia  Other reaction(s): other/intolerance  tachycardia  Patient states he had a severe allergic reaction about 15 years ago.  Hydrocodone-Acetaminophen Rash         Current Outpatient Prescriptions:     sildenafil citrate (VIAGRA) 100 mg tablet, take 1 tablet 1 hour prior to intercourse, Disp: 10 Tab, Rfl: 3    lisinopril (PRINIVIL, ZESTRIL) 10 mg tablet, Take 1 Tab by mouth daily. , Disp: 90 Tab, Rfl: 3    gabapentin (NEURONTIN) 300 mg capsule, Take 300 mg by mouth three (3) times daily. , Disp: , Rfl:     LORazepam (ATIVAN) 0.5 mg tablet, Take 1 Tab by mouth every eight (8) hours as needed for Anxiety.  Max Daily Amount: 1.5 mg. Indications: ANXIETY, Disp: 30 Tab, Rfl: 1    oxyCODONE-acetaminophen (PERCOCET) 5-325 mg per tablet, Take 1 Tab by mouth every eight (8) hours as needed for Pain. Max Daily Amount: 3 Tabs., Disp: 21 Tab, Rfl: 0    VOLTAREN 1 % gel, Apply  to affected area every six (6) hours. , Disp: 4 g, Rfl: 1    ibuprofen (MOTRIN) 800 mg tablet, Take  by mouth., Disp: , Rfl:     meloxicam (MOBIC) 7.5 mg tablet, Take 1 Tab by mouth nightly as needed. , Disp: , Rfl:     celecoxib (CELEBREX) 200 mg capsule, Take 1 Cap by mouth daily as needed. , Disp: , Rfl:     cyclobenzaprine (FLEXERIL) 10 mg tablet, Take 1 Tab by mouth three (3) times daily as needed for Muscle Spasm(s). Indications: MUSCLE SPASM, Disp: 30 Tab, Rfl: 0    aspirin 81 mg chewable tablet, Take 1 Tab by mouth daily. , Disp: 30 Tab, Rfl: 0    Physical Exam:      BP (!) 136/92  Pulse (!) 108  Temp 97.4 °F (36.3 °C) (Oral)   Resp 16  Ht 5' 8\" (1.727 m)  Wt 300 lb (136.1 kg)  SpO2 96%  BMI 45.61 kg/m2    General:  Obese habitus, Slightly anxious and uncomfortable. Eyes: sclera clear bilaterally, no discharge noted, eyelids normal in appearance  Neck: supple, no lymphadenopathy  Lungs: CTAB, initially respiratory rate was rapid and after patient became more relaxed his respiratory rate returned to normal  CV: tachycardic, no MRGs  ABD: soft, non-tender, non-distended  Skin: normal temperature, turgor, color, and texture  Psych: alert and oriented to person, place and time, normal affect      Assessment/Plan   James Dukes is a 50 y.o. male with a PMH significant for HTN and chronic back pain, presenting with elevated blood. Elevated BP appears to be multifactorial in etiology. Patient appears to be dealing with uncontrolled back pain and anxiety 2/2 death of friend and nephew's current health issues. Case presents with moderate complexity given chronic illness (HTN) with exacerbation likely 2/2 pain and anxiety.       Uncontrolled HTN:  - Chart review reveals that BP has been very well controlled on current regimen. Current elevation is likely transient and related to his current pain and anxiety. BP notably improved in the office as he became more relaxed. Anticipate his BP will become controlled again when he resumes his pain medication today. - Patient advise to start checking BP at home. He states he will purchase a BP cuff today. He was also instructed to bring his cuff to the office, so its readings can be compared to our BP machines. - He was also told to try and improve his diet. DASH diet handout given.   - Patient advised to consider water aerobics  - Will have patient return in two days, so we can verify the that his BP control has returned to normal      Vinny Underwood MD  10/18/2017, 4:34 PM

## 2017-10-18 NOTE — PROGRESS NOTES
1. Have you been to the ER, urgent care clinic since your last visit? Hospitalized since your last visit? Yes When: Patient was at [facility] on September 6th    2. Have you seen or consulted any other health care providers outside of the [address] [place] [address] since your last visit? Include any pap smears or colon screening.  No       Patient here for elevated BP today at [facility]

## 2017-10-18 NOTE — PATIENT INSTRUCTIONS

## 2017-10-20 ENCOUNTER — HOSPITAL ENCOUNTER (OUTPATIENT)
Dept: LAB | Age: 52
Discharge: HOME | End: 2017-10-20
Payer: COMMERCIAL

## 2017-10-20 ENCOUNTER — OFFICE VISIT (OUTPATIENT)
Dept: FAMILY MEDICINE CLINIC | Age: 52
End: 2017-10-20

## 2017-10-20 VITALS
RESPIRATION RATE: 16 BRPM | OXYGEN SATURATION: 95 % | TEMPERATURE: 96 F | DIASTOLIC BLOOD PRESSURE: 82 MMHG | BODY MASS INDEX: 45.19 KG/M2 | HEIGHT: 68 IN | HEART RATE: 85 BPM | WEIGHT: 298.2 LBS | SYSTOLIC BLOOD PRESSURE: 127 MMHG

## 2017-10-20 DIAGNOSIS — E78.5 HYPERLIPIDEMIA, UNSPECIFIED HYPERLIPIDEMIA TYPE: ICD-10-CM

## 2017-10-20 DIAGNOSIS — I10 ESSENTIAL HYPERTENSION: ICD-10-CM

## 2017-10-20 DIAGNOSIS — E66.01 MORBID OBESITY DUE TO EXCESS CALORIES (HCC): Primary | ICD-10-CM

## 2017-10-20 DIAGNOSIS — E11.9 CONTROLLED TYPE 2 DIABETES MELLITUS WITHOUT COMPLICATION, WITHOUT LONG-TERM CURRENT USE OF INSULIN (HCC): ICD-10-CM

## 2017-10-20 LAB
CHOLEST SERPL-MCNC: 269 MG/DL
HDLC SERPL-MCNC: 43 MG/DL (ref 40–60)
HDLC SERPL: 6.3 {RATIO} (ref 0–5)
LDLC SERPL CALC-MCNC: ABNORMAL MG/DL (ref 0–100)
LIPID PROFILE,FLP: ABNORMAL
TRIGL SERPL-MCNC: 472 MG/DL (ref ?–150)
VLDLC SERPL CALC-MCNC: ABNORMAL MG/DL

## 2017-10-20 PROCEDURE — 80061 LIPID PANEL: CPT | Performed by: FAMILY MEDICINE

## 2017-10-20 PROCEDURE — 36415 COLL VENOUS BLD VENIPUNCTURE: CPT | Performed by: FAMILY MEDICINE

## 2017-10-20 NOTE — MR AVS SNAPSHOT
Visit Information Date & Time Provider Department Dept. Phone Encounter #  
 10/20/2017  8:15 AM Mari Jeff MD [address] [facility] [phone] [id number] Follow-up Instructions Return in about 1 month (around 11/20/2017). Your Appointments 5/8/2018  9:00 AM  
ULTRASOUND with [facility]. [address] (Alana Taylor) Appt Note: Return in about 1 year (around 5/9/2018) for renal US prior. [address] [address]  
[phone]  
  
   
 [place] [id number] Upcoming Health Maintenance Date Due  
 EYE EXAM RETINAL OR DILATED Q1 1/23/1977 DTaP/Tdap/Td series (1 - Tdap) 5/12/2012 LIPID PANEL Q1 12/17/2016 HEMOGLOBIN A1C Q6M 1/21/2017 FOBT Q 1 YEAR AGE 50-75 1/23/2017 MICROALBUMIN Q1 2/8/2017 INFLUENZA AGE 9 TO ADULT 8/1/2017 FOOT EXAM Q1 10/28/2017 Allergies as of 10/20/2017  Review Complete On: 10/18/2017 By: Mari Jeff MD  
  
 Severity Noted Reaction Type Reactions Medrol [Methylprednisolone] High 08/07/2012    Anaphylaxis, Other (comments)  
 tachycardia Other reaction(s): other/intolerance 
tachycardia Patient states he had a severe allergic reaction about 15 years ago. Hydrocodone-acetaminophen Low 08/07/2012    Rash Current Immunizations  Reviewed on 2/17/2017 Name Date Influenza Vaccine 12/13/2015 Influenza Vaccine (Quad) PF 11/11/2016 Pneumococcal Polysaccharide (PPSV-23) 1/1/2016 Td 5/11/2012 Not reviewed this visit You Were Diagnosed With   
  
 Codes Comments Morbid obesity due to excess calories ([address]    -  Primary ICD-10-CM: E66.01 
ICD-9-CM: 278.01 Hyperlipidemia, unspecified hyperlipidemia type     ICD-10-CM: E78.5 ICD-9-CM: 272.4 Controlled type 2 diabetes mellitus without complication, without long-term current use of insulin ([address]     ICD-10-CM: E11.9 ICD-9-CM: 250.00 Essential hypertension     ICD-10-CM: I10 
ICD-9-CM: 401.9 Vitals BP Pulse Temp Resp Height(growth percentile) Weight(growth percentile) 127/82 85 96 °F (35.6 °C) (Oral) 16 5' 8\" (1.727 m) 298 lb 3.2 oz (135.3 kg) SpO2 BMI Smoking Status 95% 45.34 kg/m2 Never Smoker Vitals History BMI and BSA Data Body Mass Index Body Surface Area  
 45.34 kg/m 2 2.55 m 2 Preferred Pharmacy Pharmacy Name Phone [id number], [address] [place] [address] [phone] Your Updated Medication List  
  
   
This list is accurate as of: 10/20/17  9:00 AM.  Always use your most recent med list.  
  
  
  
  
 aspirin 81 mg chewable tablet Take 1 Tab by mouth daily. celecoxib 200 mg capsule Commonly known as:  CELEBREX Take 1 Cap by mouth daily as needed. cyclobenzaprine 10 mg tablet Commonly known as:  FLEXERIL Take 1 Tab by mouth three (3) times daily as needed for Muscle Spasm(s). Indications: MUSCLE SPASM  
  
 gabapentin 300 mg capsule Commonly known as:  NEURONTIN Take 300 mg by mouth three (3) times daily. ibuprofen 800 mg tablet Commonly known as:  MOTRIN Take  by mouth.  
  
 lisinopril 10 mg tablet Commonly known as:  Herberth Bridges Take 1 Tab by mouth daily. LORazepam 0.5 mg tablet Commonly known as:  ATIVAN Take 1 Tab by mouth every eight (8) hours as needed for Anxiety. Max Daily Amount: 1.5 mg. Indications: ANXIETY  
  
 meloxicam 7.5 mg tablet Commonly known as:  MOBIC Take 1 Tab by mouth nightly as needed. oxyCODONE-acetaminophen 5-325 mg per tablet Commonly known as:  PERCOCET Take 1 Tab by mouth every eight (8) hours as needed for Pain. Max Daily Amount: 3 Tabs.  
  
 sildenafil citrate 100 mg tablet Commonly known as:  VIAGRA  
take 1 tablet 1 hour prior to intercourse VOLTAREN 1 % Gel Generic drug:  diclofenac Apply  to affected area every six (6) hours. Follow-up Instructions Return in about 1 month (around 11/20/2017). To-Do List   
 10/20/2017 Lab:  LIPID PANEL Patient Instructions Learning About Cutting Calories How do calories affect your weight? Food gives your body energy. Energy from the food you eat is measured in calories. This energy keeps your heart beating, your brain active, and your muscles working. Your body needs a certain number of calories each day. After your body uses the calories it needs, it stores extra calories as fat. To lose weight safely, you have to eat fewer calories while eating in a healthy way. How many calories do you need each day? The more active you are, the more calories you need. When you are less active, you need fewer calories. How many calories you need each day also depends on several things, including your age and whether you are male or female. Here are some general guidelines for adults: 
· Less active women and older adults need 1,600 to 2,000 calories each day. · Active women and less active men need 2,000 to 2,400 calories each day. · Active men need 2,400 to 3,000 calories each day. How can you cut calories and eat healthy meals? Whole grains, vegetables and fruits, and dried beans are good lower-calorie foods. They give you lots of nutrients and fiber. And they fill you up. Sweets, energy drinks, and soda pop are high in calories. They give you few nutrients and no fiber. Try to limit soda pop, fruit juice, and energy drinks. Drink water instead. Some fats can be part of a healthy diet. But cutting back on fats from highly processed foods like fast foods and many snack foods is a good way to lower the calories in your diet. Also, use smaller amounts of fats like butter, margarine, salad dressing, and mayonnaise. Add fresh garlic, lemon, or herbs to your meals to add flavor without adding fat. Meats and dairy products can be a big source of hidden fats.  Try to choose lean or low-fat versions of these products. Fat-free cookies, candies, chips, and frozen treats can still be high in sugar and calories. Some fat-free foods have more calories than regular ones. Eat fat-free treats in moderation, as you would other foods. If your favorite foods are high in fat, salt, sugar, or calories, limit how often you eat them. Eat smaller servings, or look for healthy substitutes. Fill up on fruits, vegetables, and whole grains. Eating at home · Use meat as a side dish instead of as the main part of your meal. 
· Try main dishes that use whole wheat pasta, brown rice, dried beans, or vegetables. · Find ways to cook with little or no fat, such as broiling, steaming, or grilling. · Use cooking spray instead of oil. If you use oil, use a monounsaturated oil, such as canola or olive oil. · Trim fat from meats before you cook them. · Drain off fat after you brown the meat or while you roast it. · Chill soups and stews after you cook them. Then skim the fat off the top after it hardens. Eating out · Order foods that are broiled or poached rather than fried or breaded. · Cut back on the amount of butter or margarine that you use on bread. · Order sauces, gravies, and salad dressings on the side, and use only a little. · When you order pasta, choose tomato sauce rather than cream sauce. · Ask for salsa with your baked potato instead of sour cream, butter, cheese, or mast. · Order meals in a small size instead of upgrading to a large. · Share an entree, or take part of your food home to eat as another meal. 
· Share appetizers and desserts. Where can you learn more? Go to http://andreia-liliane.info/. Enter 99 [id number] in the search box to learn more about \"Learning About Cutting Calories. \" Current as of: November 9, 2016 Content Version: 11.3 © 3890-4163 [employer].  Care instructions adapted under license by [address] (which disclaims liability or warranty for this information). If you have questions about a medical condition or this instruction, always ask your healthcare professional. [address] any warranty or liability for your use of this information. Introducing [facility]! Dear Stevenson Hernandez: Thank you for requesting a Vook account. Our records indicate that you already have an active Vook account. You can access your account anytime at [email or web address] Did you know that you can access your hospital and ER discharge instructions at any time in Vook? You can also review all of your test results from your hospital stay or ER visit. Additional Information If you have questions, please visit the Frequently Asked Questions section of the Vook website at [email or web address] Remember, Vook is NOT to be used for urgent needs. For medical emergencies, dial 911. Now available from your iPhone and Android! Please provide this summary of care documentation to your next provider. Your primary care clinician is listed as Boy Lambert. If you have any questions after today's visit, please call [phone].

## 2017-10-20 NOTE — PATIENT INSTRUCTIONS

## 2017-10-20 NOTE — PROGRESS NOTES
Jeanne Miller    CC: HTN follow up    HPI:     HTN:  - Has purchase a BP cuff, as recommended at his last visit  - Has brought BP machine in for evaluation  - Has been checking BP at home. Reports readings are inconsistent. Not waiting 5 minutes after sitting before checking BP  - He is taking his medication as prescribed. Denies any issues or side effects.  - Pain is now back under control, since starting his pain medication    DMII:  - He is taking his medication as prescribed.  Denies any issues or side effects.  - Reports having had his yearly eye exam  - Does not check his blood sugar at home  - Has not been consistently following his diabetic diet  - Denies any noticeable symptoms of elevated blood sugar (See ROS)    HLD:  - Patient reports a history of elevated cholesterol  - Is not on any medication  - Is not following a low cholesterol diet    Obesity:  - lost ~1 pound since last visit  - Is not following an exercise regimen (2/2 chronic pain issues)  - Is not following a low calorie diet      ROS: Positive items marked in RED  CON: fever, chills, fatigue, changes in weight  HEENT: HA, changes in vision  Cardiovascular: palpitations, swelling of lower extremities, CP  Resp: hemoptysis, SOB  GI: nausea, vomiting, abd pain  SKIN: rash, itching, ulcerations  : changes in urinary frequency, dysuria, hematuria  Neuro: numbness, tingling, dizziness, lightheaded, weakness  Endo: changes in appetite, changes in thirst    Past Medical History:   Diagnosis Date    Abdominal pain     Adverse effect of anesthesia     brother- BP drops after surgery-difficult to wake    Back pain     Chest pain     Diabetes mellitus type 2 in obese (HCC)     Diverticulitis     Esophageal reflux     Hypertension     Kidney stones     Microscopic hematuria     Osteoarthritis     Pneumonia     Renal calculus, right     Sepsis ([facility] [address]     Snores     Swallowing difficulty     Ureteral calculi     Ureteral stent retained     Urine leukocytes        Past Surgical History:   Procedure Laterality Date    HX CHOLECYSTECTOMY      2002    HX COLONOSCOPY  02.22.2016    HX HIP REPLACEMENT  08/04/2016    HX UROLOGICAL  06/06/2016    [facility]-Cystoscopy, CYSTOURETHROSCOPY W/ INDWELLING URETERAL STENT INSERTION, Dr Sakina Carty HX UROLOGICAL  05/29/2016    [facility] Cystoscopy, Right retrograde pyelography, Right 6 x 26 cm double-J ureteral stent placement,Intraoperative fluoro less than 1 hour,Interpretation of Urography, Dr Floridalma Navarro          Family History   Problem Relation Age of Onset    Cancer Mother      Lung    Hypertension Mother     Stroke Mother     Cancer Father      Brain       Social History     Social History    Marital status:      Spouse name: N/A    Number of children: N/A    Years of education: N/A     Social History Main Topics    Smoking status: Never Smoker    Smokeless tobacco: Never Used    Alcohol use No    Drug use: No    Sexual activity: Yes     Partners: Female     Other Topics Concern    None     Social History Narrative       Allergies   Allergen Reactions    Medrol [Methylprednisolone] Anaphylaxis and Other (comments)     tachycardia  Other reaction(s): other/intolerance  tachycardia  Patient states he had a severe allergic reaction about 15 years ago.  Hydrocodone-Acetaminophen Rash         Current Outpatient Prescriptions:     metFORMIN (GLUCOPHAGE) 500 mg tablet, Take 500 mg by mouth two (2) times daily (with meals). , Disp: , Rfl:     sildenafil citrate (VIAGRA) 100 mg tablet, take 1 tablet 1 hour prior to intercourse, Disp: 10 Tab, Rfl: 3    ibuprofen (MOTRIN) 800 mg tablet, Take  by mouth., Disp: , Rfl:     lisinopril (PRINIVIL, ZESTRIL) 10 mg tablet, Take 1 Tab by mouth daily. , Disp: 90 Tab, Rfl: 3    gabapentin (NEURONTIN) 300 mg capsule, Take 300 mg by mouth three (3) times daily. , Disp: , Rfl:     LORazepam (ATIVAN) 0.5 mg tablet, Take 1 Tab by mouth every eight (8) hours as needed for Anxiety. Max Daily Amount: 1.5 mg. Indications: ANXIETY, Disp: 30 Tab, Rfl: 1    oxyCODONE-acetaminophen (PERCOCET) 5-325 mg per tablet, Take 1 Tab by mouth every eight (8) hours as needed for Pain. Max Daily Amount: 3 Tabs., Disp: 21 Tab, Rfl: 0    meloxicam (MOBIC) 7.5 mg tablet, Take 1 Tab by mouth nightly as needed. , Disp: , Rfl:     celecoxib (CELEBREX) 200 mg capsule, Take 1 Cap by mouth daily as needed. , Disp: , Rfl:     cyclobenzaprine (FLEXERIL) 10 mg tablet, Take 1 Tab by mouth three (3) times daily as needed for Muscle Spasm(s). Indications: MUSCLE SPASM, Disp: 30 Tab, Rfl: 0    VOLTAREN 1 % gel, Apply  to affected area every six (6) hours. , Disp: 4 g, Rfl: 1    aspirin 81 mg chewable tablet, Take 1 Tab by mouth daily. , Disp: 30 Tab, Rfl: 0    Physical Exam:      /82  Pulse 85  Temp 96 °F (35.6 °C) (Oral)   Resp 16  Ht 5' 8\" (1.727 m)  Wt 298 lb 3.2 oz (135.3 kg)  SpO2 95%  BMI 45.34 kg/m2    General:  Obese habitus, NAD  Eyes: sclera clear bilaterally, no discharge noted, eyelids normal in appearance  HENT: NCAT  Lungs: CTAB, Normal respiratory effort and rate  CV: RRR, no MRGs  ABD: soft, non-tender, non-distended  Skin: normal temperature, turgor, color, and texture  Psych: alert and oriented to person, place and time, normal affect  Neuro: Speech normal, Moving all extremities, gait normal      Assessment/Plan   Brown Gates is a 50 y.o. male with a PMH significant HTN, DMII, HLD, Obesity, presenting for follow up of uncontrolled BP. HTN, well controlled:  - At goal BP of <140/90  - Home machine with a 10 point difference form office machine. Recommended he get a new machine  - Will continue current BP regimen  - Patient's labs are up to date (See care everywhere labs)    DMII, well controlled:  - HGA1C was last checked at the hospital on 9/6/17. Was 6.4%  - Will continue current reported regimen.    - Will need to verify proper use of metformin at follow up visit, as he only had a 3 month supply and ~4 months have passed per chart review  - Will need to get eye exam notes sent to office    HLD, uncontrolled:  - Patient with diabetes, it is recommended by AHA/ACC the he be on a statin  - Will check lipid panel, as patient reports he is fasting  - Will calculate ASCVD Risk after results are obtained and will discuss the results at follow up  - Patient to follow up in one month  - Patient will likely need to be started on a statin, unable to discuss this need as he had to go to his friend's .    Morbid Obesity:  - Minimally improved, as he has lost 1 pound since last visit  - Patient was recommended to start following a low calorie diet  - Handout given on calorie counting        Maritza Villa MD  10/21/2017, 8:37 AM

## 2017-10-20 NOTE — PROGRESS NOTES
1. Have you been to the ER, urgent care clinic since your last visit? Hospitalized since your last visit? No    2. Have you seen or consulted any other health care providers outside of the [address] [place] [address] since your last visit? Include any pap smears or colon screening.  Yes patient was seen at ENT on 10-    Patient here today for BP follow-up

## 2017-10-21 RX ORDER — METFORMIN HYDROCHLORIDE 500 MG/1
500 TABLET ORAL 2 TIMES DAILY WITH MEALS
COMMUNITY
Start: 2017-07-17 | End: 2017-11-09 | Stop reason: SDUPTHER

## 2017-11-09 ENCOUNTER — OFFICE VISIT (OUTPATIENT)
Dept: FAMILY MEDICINE CLINIC | Age: 52
End: 2017-11-09

## 2017-11-09 VITALS
OXYGEN SATURATION: 93 % | HEART RATE: 95 BPM | SYSTOLIC BLOOD PRESSURE: 136 MMHG | HEIGHT: 68 IN | DIASTOLIC BLOOD PRESSURE: 82 MMHG | TEMPERATURE: 96.7 F | RESPIRATION RATE: 16 BRPM | WEIGHT: 302.6 LBS | BODY MASS INDEX: 45.86 KG/M2

## 2017-11-09 DIAGNOSIS — I10 ESSENTIAL HYPERTENSION: ICD-10-CM

## 2017-11-09 DIAGNOSIS — E11.9 TYPE 2 DIABETES MELLITUS WITHOUT COMPLICATION, WITHOUT LONG-TERM CURRENT USE OF INSULIN (HCC): ICD-10-CM

## 2017-11-09 DIAGNOSIS — J01.90 ACUTE RHINOSINUSITIS: Primary | ICD-10-CM

## 2017-11-09 DIAGNOSIS — E78.2 MIXED HYPERLIPIDEMIA: ICD-10-CM

## 2017-11-09 RX ORDER — LISINOPRIL 10 MG/1
10 TABLET ORAL DAILY
Qty: 90 TAB | Refills: 3 | Status: SHIPPED | OUTPATIENT
Start: 2017-11-09 | End: 2017-11-20 | Stop reason: SDUPTHER

## 2017-11-09 RX ORDER — METFORMIN HYDROCHLORIDE 500 MG/1
500 TABLET ORAL 2 TIMES DAILY WITH MEALS
Qty: 90 TAB | Refills: 1 | Status: SHIPPED | OUTPATIENT
Start: 2017-11-09 | End: 2018-04-05 | Stop reason: SDUPTHER

## 2017-11-09 RX ORDER — ATORVASTATIN CALCIUM 20 MG/1
20 TABLET, FILM COATED ORAL DAILY
Qty: 30 TAB | Refills: 1 | Status: SHIPPED | OUTPATIENT
Start: 2017-11-09 | End: 2017-11-20 | Stop reason: ALTCHOICE

## 2017-11-09 RX ORDER — FLUTICASONE PROPIONATE 50 MCG
2 SPRAY, SUSPENSION (ML) NASAL DAILY
Qty: 1 BOTTLE | Refills: 3 | Status: SHIPPED | OUTPATIENT
Start: 2017-11-09 | End: 2019-03-20 | Stop reason: SDUPTHER

## 2017-11-09 NOTE — PROGRESS NOTES
[facility]    CC: HA with cold symptoms    HPI:     - Onset: HA started last night  - Context: Having cold symptoms  - Quality: Throbbing   - Severity: 5/10-9/10  - Duration: >12hr  - Location: Right temple  - Progression/Course: Unchanged  - Associated Symptoms: Nasal congestion, post nasal drip, light green discharge, fever (Tmax 101.5), eye pain (Behind eye)  - Tried: Sudafed and Excedrin migraine  - Exposures: Endorses sick contact (using office of person who is out sick)  - Notable pertinent PMH of seasonal allergies    HTN:  - He is taking his medication as prescribed. Denies any issues or side effects.  - Needs refill of his lisinopril     DMII:  - He is taking his medication as prescribed.  Denies any issues or side effects.  - Needs refill of metformin     HLD:  - Is not on any medication  - Is not following a heart healthy diet  - <150 mins of physical activity a week    ROS: Positive items marked in RED  CON: fever, chills  HEENT: HA, eye pain, nasal congestion, rhinorrhea, post nasal drip  Cardiovascular: palpitations, CP  Resp: hemoptysis, SOB  GI: nausea, vomiting, abd pain  SKIN: rash, itching  : dysuria, hematuria  Neuro: numbness, tingling  Endo: polydipsia, polyuria    Past Medical History:   Diagnosis Date    Abdominal pain     Adverse effect of anesthesia     brother- BP drops after surgery-difficult to wake    Back pain     Chest pain     Diabetes mellitus type 2 in obese (HCC)     Diverticulitis     Esophageal reflux     Hypertension     Kidney stones     Microscopic hematuria     Osteoarthritis     Pneumonia     Renal calculus, right     Sepsis ([address]     Snores     Swallowing difficulty     Ureteral calculi     Ureteral stent retained     Urine leukocytes        Past Surgical History:   Procedure Laterality Date    HX CHOLECYSTECTOMY      2002    HX COLONOSCOPY  02.22.2016    HX HIP REPLACEMENT  08/04/2016    HX UROLOGICAL  06/06/2016    [facility]Cystoscopy, CYSTOURETHROSCOPY W/ INDWELLING URETERAL STENT INSERTION, Dr Mikala Hope  UROLOGICAL  05/29/2016    [facility]- Cystoscopy, Right retrograde pyelography, Right 6 x 26 cm double-J ureteral stent placement,Intraoperative fluoro less than 1 hour,Interpretation of Urography, Dr Laci Vivar          Family History   Problem Relation Age of Onset    Cancer Mother      Lung    Hypertension Mother     Stroke Mother     Cancer Father      Brain       Social History     Social History    Marital status:      Spouse name: N/A    Number of children: N/A    Years of education: N/A     Social History Main Topics    Smoking status: Never Smoker    Smokeless tobacco: Never Used    Alcohol use No    Drug use: No    Sexual activity: Yes     Partners: Female     Other Topics Concern    None     Social History Narrative       Allergies   Allergen Reactions    Medrol [Methylprednisolone] Anaphylaxis and Other (comments)     tachycardia  Other reaction(s): other/intolerance  tachycardia  Patient states he had a severe allergic reaction about 15 years ago.  Hydrocodone-Acetaminophen Rash         Current Outpatient Prescriptions:     metFORMIN (GLUCOPHAGE) 500 mg tablet, Take 500 mg by mouth two (2) times daily (with meals). , Disp: , Rfl:     sildenafil citrate (VIAGRA) 100 mg tablet, take 1 tablet 1 hour prior to intercourse, Disp: 10 Tab, Rfl: 3    ibuprofen (MOTRIN) 800 mg tablet, Take  by mouth., Disp: , Rfl:     lisinopril (PRINIVIL, ZESTRIL) 10 mg tablet, Take 1 Tab by mouth daily. , Disp: 90 Tab, Rfl: 3    gabapentin (NEURONTIN) 300 mg capsule, Take 300 mg by mouth three (3) times daily. , Disp: , Rfl:     LORazepam (ATIVAN) 0.5 mg tablet, Take 1 Tab by mouth every eight (8) hours as needed for Anxiety. Max Daily Amount: 1.5 mg. Indications: ANXIETY, Disp: 30 Tab, Rfl: 1    oxyCODONE-acetaminophen (PERCOCET) 5-325 mg per tablet, Take 1 Tab by mouth every eight (8) hours as needed for Pain.  Max Daily Amount: 3 Tabs., Disp: 21 Tab, Rfl: 0    cyclobenzaprine (FLEXERIL) 10 mg tablet, Take 1 Tab by mouth three (3) times daily as needed for Muscle Spasm(s). Indications: MUSCLE SPASM, Disp: 30 Tab, Rfl: 0    VOLTAREN 1 % gel, Apply  to affected area every six (6) hours. , Disp: 4 g, Rfl: 1    aspirin 81 mg chewable tablet, Take 1 Tab by mouth daily. , Disp: 30 Tab, Rfl: 0    Physical Exam:      /82  Pulse 95  Temp 96.7 °F (35.9 °C) (Oral)   Resp 16  Ht 5' 8\" (1.727 m)  Wt 302 lb 9.6 oz (137.3 kg)  SpO2 93%  BMI 46.01 kg/m2    General:  Obese habitus, NAD  Eyes: sclera clear bilaterally, no discharge noted, eyelids normal in appearance  HENT: NCAT, no sinus tenderness, nasal turbinates enlarged bilaterally, oropharynx clear, MMM  Neck: supple, no lymphadenopathy  Lungs: CTAB, Normal respiratory effort and rate  CV: RRR, no MRGs  ABD: soft, no guarding, non-distended, normal bowel sounds  Skin: normal temperature, turgor, color, and texture  Psych: alert and oriented to person, place and time, normal affect  Neuro: Speech normal, Moving all extremities, gait normal    Results for Eduard Shouts (MRN [id number]) as of 11/16/2017 22:43   Ref. Range 10/20/2017 09:03   Triglyceride Latest Ref Range: <150 MG/ (H)   Cholesterol, total Latest Ref Range: <200 MG/ (H)   HDL Cholesterol Latest Ref Range: 40 - 60 MG/DL 43   CHOL/HDL Ratio Latest Ref Range: 0 - 5.0   6.3 (H)   LDL, calculated Latest Ref Range: 0 - 100 MG/DL LDL AND VLDL CHOL. .. VLDL, calculated Latest Units: MG/DL Calculation not v... Assessment/Plan   Kemal Walker is a 50 y.o. male with a PMH significant for HTN, HLD, DMII, presenting with acute rhinosinusitis.       Acute Rhinosinusitis:  - Educated on likely viral etiology  - Treatment to focus on symptoms  - Will start on Flonase  - Handout given on nasal steroid and sinusitis  - Follow up in one week in no improvement or symptoms get worse    HLD, uncontrolled:  - Patient with diabetes, it is recommended by AHA/ACC the he be on a statin  - Will start on moderate intensity statin therapy. (Atorvastatin 20mg daily)  - If statin is tolerated, plan to increase to high intensity statin therapy (Atorvastatin 40mg daily) at follow up appointment    HTN, well controlled:  - At goal BP of <140/90  - Will refill lisinopril today     DMII, well controlled:  - HGBA1C was last checked at the hospital on 9/6/17. Was 6.4%  - Will refill metformin today.          Harriet Holm MD  11/9/2017, 1:12 PM

## 2017-11-09 NOTE — PROGRESS NOTES
1. Have you been to the ER, urgent care clinic since your last visit? Hospitalized since your last visit? No    2. Have you seen or consulted any other health care providers outside of the [address] [place] [address] since your last visit? Include any pap smears or colon screening. Yes When: Patient saw Pain Mangement Doctor 11-8-2017     Patient here today for a headache mostly on right side , fever, and cough times 1 day.

## 2017-11-09 NOTE — MR AVS SNAPSHOT
Visit Information Date & Time Provider Department Dept. Phone Encounter #  
 11/9/2017  1:15 PM Beto Karimi, [facility] [phone] [id number] Follow-up Instructions Return in about 1 week (around 11/16/2017) for follow up. Your Appointments 11/24/2017  9:30 AM  
Follow Up with Beto Karimi MD  
[facility] 23 ([address]) Appt Note: 1 MONTH FU APPT [facility]. [address] [facility] [address]  
  
   
 1500 [facility]  
  
    
 5/8/2018  9:00 AM  
ULTRASOUND with [facility] ULTRASOUND [facility]. [place] [address] ([address] [facility]) Appt Note: Return in about 1 year (around 5/9/2018) for renal US prior. [address] [address]  
[phone]  
  
   
 [place] [id number] Upcoming Health Maintenance Date Due  
 EYE EXAM RETINAL OR DILATED Q1 1/23/1977 DTaP/Tdap/Td series (1 - Tdap) 5/12/2012 HEMOGLOBIN A1C Q6M 1/21/2017 FOBT Q 1 YEAR AGE 50-75 1/23/2017 MICROALBUMIN Q1 2/8/2017 Influenza Age 5 to Adult 8/1/2017 FOOT EXAM Q1 10/28/2017 LIPID PANEL Q1 10/20/2018 Allergies as of 11/9/2017  Review Complete On: 11/9/2017 By: Beto Karimi MD  
  
 Severity Noted Reaction Type Reactions Medrol [Methylprednisolone] High 08/07/2012    Other (comments)  
 tachycardia Patient states he had a severe allergic reaction about 15 years ago. Hydrocodone-acetaminophen Low 08/07/2012    Rash Current Immunizations  Reviewed on 2/17/2017 Name Date Influenza Vaccine 12/13/2015 Influenza Vaccine (Quad) PF 11/11/2016 Pneumococcal Polysaccharide (PPSV-23) 1/1/2016 Td 5/11/2012 Not reviewed this visit You Were Diagnosed With   
  
 Codes Comments Acute rhinosinusitis    -  Primary ICD-10-CM: J01.90 ICD-9-CM: 461.9 Mixed hyperlipidemia     ICD-10-CM: E78.2 ICD-9-CM: 272.2 Type 2 diabetes mellitus without complication, without long-term current use of insulin (HCC)     ICD-10-CM: E11.9 ICD-9-CM: 250.00 Essential hypertension     ICD-10-CM: I10 
ICD-9-CM: 401.9 Vitals BP Pulse Temp Resp Height(growth percentile) Weight(growth percentile) 136/82 95 96.7 °F (35.9 °C) (Oral) 16 5' 8\" (1.727 m) 302 lb 9.6 oz (137.3 kg) SpO2 BMI Smoking Status 93% 46.01 kg/m2 Never Smoker Vitals History BMI and BSA Data Body Mass Index Body Surface Area 46.01 kg/m 2 2.57 m 2 Preferred Pharmacy Pharmacy Name Phone [facility], [address] [place] [address] [phone] Your Updated Medication List  
  
   
This list is accurate as of: 11/9/17  1:51 PM.  Always use your most recent med list.  
  
  
  
  
 aspirin 81 mg chewable tablet Take 1 Tab by mouth daily. atorvastatin 20 mg tablet Commonly known as:  LIPITOR Take 1 Tab by mouth daily. cyclobenzaprine 10 mg tablet Commonly known as:  FLEXERIL Take 1 Tab by mouth three (3) times daily as needed for Muscle Spasm(s). Indications: MUSCLE SPASM  
  
 fluticasone 50 mcg/actuation nasal spray Commonly known as:  Rohith Helena 2 Sprays by Both Nostrils route daily. gabapentin 300 mg capsule Commonly known as:  NEURONTIN Take 300 mg by mouth three (3) times daily. ibuprofen 800 mg tablet Commonly known as:  MOTRIN Take  by mouth.  
  
 lisinopril 10 mg tablet Commonly known as:  Nonah Silva Take 1 Tab by mouth daily. LORazepam 0.5 mg tablet Commonly known as:  ATIVAN Take 1 Tab by mouth every eight (8) hours as needed for Anxiety. Max Daily Amount: 1.5 mg. Indications: ANXIETY  
  
 metFORMIN 500 mg tablet Commonly known as:  GLUCOPHAGE Take 1 Tab by mouth two (2) times daily (with meals). Indications: type 2 diabetes mellitus  
  
 oxyCODONE-acetaminophen 5-325 mg per tablet Commonly known as:  PERCOCET Take 1 Tab by mouth every eight (8) hours as needed for Pain. Max Daily Amount: 3 Tabs.  
  
 sildenafil citrate 100 mg tablet Commonly known as:  VIAGRA  
take 1 tablet 1 hour prior to intercourse VOLTAREN 1 % Gel Generic drug:  diclofenac Apply  to affected area every six (6) hours. Prescriptions Sent to Pharmacy Refills  
 lisinopril (PRINIVIL, ZESTRIL) 10 mg tablet 3 Sig: Take 1 Tab by mouth daily. Class: Normal  
 Pharmacy: [facility] Ph #: [phone] Route: Oral  
 metFORMIN (GLUCOPHAGE) 500 mg tablet 1 Sig: Take 1 Tab by mouth two (2) times daily (with meals). Indications: type 2 diabetes mellitus Class: Normal  
 Pharmacy: [facility] Ph #: [phone] Route: Oral  
 atorvastatin (LIPITOR) 20 mg tablet 1 Sig: Take 1 Tab by mouth daily. Class: Normal  
 Pharmacy: [facility] Ph #: [phone] Route: Oral  
 fluticasone (FLONASE) 50 mcg/actuation nasal spray 3 Si Sprays by Both Nostrils route daily. Class: Normal  
 Pharmacy: [facility] Ph #: [phone] Route: Both Nostrils Follow-up Instructions Return in about 1 week (around 2017) for follow up. Patient Instructions Sinusitis: Care Instructions Your Care Instructions Sinusitis is an infection of the lining of the sinus cavities in your head. Sinusitis often follows a cold. It causes pain and pressure in your head and face. In most cases, sinusitis gets better on its own in 1 to 2 weeks. But some mild symptoms may last for several weeks. Sometimes antibiotics are needed. Follow-up care is a key part of your treatment and safety.  Be sure to make and go to all appointments, and call your doctor if you are having problems. It's also a good idea to know your test results and keep a list of the medicines you take. How can you care for yourself at home? · Take an over-the-counter pain medicine, such as acetaminophen (Tylenol), ibuprofen (Advil, Motrin), or naproxen (Aleve). Read and follow all instructions on the label. · If the doctor prescribed antibiotics, take them as directed. Do not stop taking them just because you feel better. You need to take the full course of antibiotics. · Be careful when taking over-the-counter cold or flu medicines and Tylenol at the same time. Many of these medicines have acetaminophen, which is Tylenol. Read the labels to make sure that you are not taking more than the recommended dose. Too much acetaminophen (Tylenol) can be harmful. · Breathe warm, moist air from a steamy shower, a hot bath, or a sink filled with hot water. Avoid cold, dry air. Using a humidifier in your home may help. Follow the directions for cleaning the machine. · Use saline (saltwater) nasal washes to help keep your nasal passages open and wash out mucus and bacteria. You can buy saline nose drops at a grocery store or drugstore. Or you can make your own at home by adding 1 teaspoon of salt and 1 teaspoon of baking soda to 2 cups of distilled water. If you make your own, fill a bulb syringe with the solution, insert the tip into your nostril, and squeeze gently. Tonia Males your nose. · Put a hot, wet towel or a warm gel pack on your face 3 or 4 times a day for 5 to 10 minutes each time. · Try a decongestant nasal spray like oxymetazoline (Afrin). Do not use it for more than 3 days in a row. Using it for more than 3 days can make your congestion worse. When should you call for help? Call your doctor now or seek immediate medical care if: 
? · You have new or worse swelling or redness in your face or around your eyes. ? · You have a new or higher fever. ?Watch closely for changes in your health, and be sure to contact your doctor if: 
? · You have new or worse facial pain. ? · The mucus from your nose becomes thicker (like pus) or has new blood in it. ? · You are not getting better as expected. Where can you learn more? Go to http://andreia-liliane.info/. Enter A729 in the search box to learn more about \"Sinusitis: Care Instructions. \" Current as of: May 12, 2017 Content Version: 11.4 © 7700-7038 Client Outlook. Care instructions adapted under license by Harperlabz (which disclaims liability or warranty for this information). If you have questions about a medical condition or this instruction, always ask your healthcare professional. [address] any warranty or liability for your use of this information. Saline Nasal Washes: Care Instructions Your Care Instructions Saline nasal washes help keep the nasal passages open by washing out thick or dried mucus. This simple remedy can help relieve symptoms of allergies, sinusitis, and colds. It also can make the nose feel more comfortable by keeping the mucous membranes moist. You may notice a little burning sensation in your nose the first few times you use the solution, but this usually gets better in a few days. Follow-up care is a key part of your treatment and safety. Be sure to make and go to all appointments, and call your doctor if you are having problems. It's also a good idea to know your test results and keep a list of the medicines you take. How can you care for yourself at home? · You can buy premixed saline solution in a squeeze bottle or other sinus rinse products at a drugstore. Read and follow the instructions on the label. · You also can make your own saline solution by adding 1 teaspoon of salt and 1 teaspoon of baking soda to 2 cups of distilled water. · If you use a homemade solution, pour a small amount into a clean bowl. Using a rubber bulb syringe, squeeze the syringe and place the tip in the salt water. Pull a small amount of the salt water into the syringe by relaxing your hand. · Sit down with your head tilted slightly back. Do not lie down. Put the tip of the bulb syringe or the squeeze bottle a little way into one of your nostrils. Gently drip or squirt a few drops into the nostril. Repeat with the other nostril. Some sneezing and gagging are normal at first. 
· Gently blow your nose. · Wipe the syringe or bottle tip clean after each use. · Repeat this 2 or 3 times a day. · Use nasal washes gently if you have nosebleeds often. When should you call for help? Watch closely for changes in your health, and be sure to contact your doctor if: 
? · You often get nosebleeds. ? · You have problems doing the nasal washes. Where can you learn more? Go to http://andreia-liliane.info/. Enter [id number] in the search box to learn more about \"Saline Nasal Washes: Care Instructions. \" Current as of: May 12, 2017 Content Version: 11.4 © 9917-1929 Palladium Life Sciences. Care instructions adapted under license by LegitTrader (which disclaims liability or warranty for this information). If you have questions about a medical condition or this instruction, always ask your healthcare professional. [place] [address] any warranty or liability for your use of this information. Using a Nasal Steroid Spray: Care Instructions Your Care Instructions Your doctor may suggest using a corticosteroid nasal spray for your allergy symptoms or sinus problems. These sprays reduce the swelling inside the nose and sinuses. Unlike decongestant nasal sprays, steroid sprays won't lead to more swelling when you stop taking them. These sprays start working in a few days, but it may take several weeks before you get the full effect. Most side effects are minor. The most common complaint is a burning feeling in the nose right after the spray is used. Some people get nosebleeds. Follow-up care is a key part of your treatment and safety. Be sure to make and go to all appointments, and call your doctor if you are having problems. It's also a good idea to know your test results and keep a list of the medicines you take. How can you care for yourself at home? Here are some tips for using these sprays: 
· You may need to prime the sprayer before you use it. This means spraying it into the air a few times to make sure you get the right amount of medicine. Follow the directions on the label. · Blow your nose before you spray. This will help clear out your nostrils. · Gently sniff the medicine into your nose as you spray. Don't snort, or the medicine will go all the way into your throat where it won't do much good. · Aim the nozzle straight toward the outer wall of your nostril. This will help keep the medicine from irritating the inner walls of your nose, especially your septum (the wall that separates your left and right nostrils). · Don't blow your nose for 10 minutes or so after you spray. And try not to sneeze. · Be safe with medicines. Use this medicine exactly as prescribed. Call your doctor if you think you are having a problem with your medicine. · Clean your sprayer once a week. Read the label to learn how. When should you call for help? Watch closely for changes in your health, and be sure to contact your doctor if you have any problems. Where can you learn more? Go to http://andreia-liliane.info/. Enter Q369 in the search box to learn more about \"Using a Nasal Steroid Spray: Care Instructions. \" Current as of: May 12, 2017 Content Version: 11.4 © 7462-7601 Healthwise, Glipho.  Care instructions adapted under license by Crushpath (which disclaims liability or warranty for this information). If you have questions about a medical condition or this instruction, always ask your healthcare professional. [address] any warranty or liability for your use of this information. Introducing [facility]! Dear Glenn Meza: Thank you for requesting a [employer] account. Our records indicate that you already have an active [employer] account. You can access your account anytime at [email or web address] Did you know that you can access your hospital and ER discharge instructions at any time in [employer]? You can also review all of your test results from your hospital stay or ER visit. Additional Information If you have questions, please visit the Frequently Asked Questions section of the [employer] website at [email or web address] Remember, [employer] is NOT to be used for urgent needs. For medical emergencies, dial 911. Now available from your iPhone and Android! Please provide this summary of care documentation to your next provider. Your primary care clinician is listed as Jose Bhatt. If you have any questions after today's visit, please call [phone].

## 2017-11-09 NOTE — PATIENT INSTRUCTIONS
Sinusitis: Care Instructions  Your Care Instructions    Sinusitis is an infection of the lining of the sinus cavities in your head. Sinusitis often follows a cold. It causes pain and pressure in your head and face. In most cases, sinusitis gets better on its own in 1 to 2 weeks. But some mild symptoms may last for several weeks. Sometimes antibiotics are needed. Follow-up care is a key part of your treatment and safety. Be sure to make and go to all appointments, and call your doctor if you are having problems. It's also a good idea to know your test results and keep a list of the medicines you take. How can you care for yourself at home? · Take an over-the-counter pain medicine, such as acetaminophen (Tylenol), ibuprofen (Advil, Motrin), or naproxen (Aleve). Read and follow all instructions on the label. · If the doctor prescribed antibiotics, take them as directed. Do not stop taking them just because you feel better. You need to take the full course of antibiotics. · Be careful when taking over-the-counter cold or flu medicines and Tylenol at the same time. Many of these medicines have acetaminophen, which is Tylenol. Read the labels to make sure that you are not taking more than the recommended dose. Too much acetaminophen (Tylenol) can be harmful. · Breathe warm, moist air from a steamy shower, a hot bath, or a sink filled with hot water. Avoid cold, dry air. Using a humidifier in your home may help. Follow the directions for cleaning the machine. · Use saline (saltwater) nasal washes to help keep your nasal passages open and wash out mucus and bacteria. You can buy saline nose drops at a grocery store or drugstore. Or you can make your own at home by adding 1 teaspoon of salt and 1 teaspoon of baking soda to 2 cups of distilled water. If you make your own, fill a bulb syringe with the solution, insert the tip into your nostril, and squeeze gently. [place]vine your nose.   · Put a hot, wet towel or a warm gel pack on your face 3 or 4 times a day for 5 to 10 minutes each time. · Try a decongestant nasal spray like oxymetazoline (Afrin). Do not use it for more than 3 days in a row. Using it for more than 3 days can make your congestion worse. When should you call for help? Call your doctor now or seek immediate medical care if:  ? · You have new or worse swelling or redness in your face or around your eyes. ? · You have a new or higher fever. ? Watch closely for changes in your health, and be sure to contact your doctor if:  ? · You have new or worse facial pain. ? · The mucus from your nose becomes thicker (like pus) or has new blood in it. ? · You are not getting better as expected. Where can you learn more? Go to http://andreia-liliane.info/. Enter B391 in the search box to learn more about \"Sinusitis: Care Instructions. \"  Current as of: May 12, 2017  Content Version: 11.4  © 9943-5005 Solmentum. Care instructions adapted under license by HopStop.com (which disclaims liability or warranty for this information). If you have questions about a medical condition or this instruction, always ask your healthcare professional. [place] [address] any warranty or liability for your use of this information. Saline Nasal Washes: Care Instructions  Your Care Instructions  Saline nasal washes help keep the nasal passages open by washing out thick or dried mucus. This simple remedy can help relieve symptoms of allergies, sinusitis, and colds. It also can make the nose feel more comfortable by keeping the mucous membranes moist. You may notice a little burning sensation in your nose the first few times you use the solution, but this usually gets better in a few days. Follow-up care is a key part of your treatment and safety. Be sure to make and go to all appointments, and call your doctor if you are having problems.  It's also a good idea to know your test results and keep a list of the medicines you take. How can you care for yourself at home? · You can buy premixed saline solution in a squeeze bottle or other sinus rinse products at a drugstore. Read and follow the instructions on the label. · You also can make your own saline solution by adding 1 teaspoon of salt and 1 teaspoon of baking soda to 2 cups of distilled water. · If you use a homemade solution, pour a small amount into a clean bowl. Using a rubber bulb syringe, squeeze the syringe and place the tip in the salt water. Pull a small amount of the salt water into the syringe by relaxing your hand. · Sit down with your head tilted slightly back. Do not lie down. Put the tip of the bulb syringe or the squeeze bottle a little way into one of your nostrils. Gently drip or squirt a few drops into the nostril. Repeat with the other nostril. Some sneezing and gagging are normal at first.  · Gently blow your nose. · Wipe the syringe or bottle tip clean after each use. · Repeat this 2 or 3 times a day. · Use nasal washes gently if you have nosebleeds often. When should you call for help? Watch closely for changes in your health, and be sure to contact your doctor if:  ? · You often get nosebleeds. ? · You have problems doing the nasal washes. Where can you learn more? Go to http://andreia-liliane.info/. Enter [id number] in the search box to learn more about \"Saline Nasal Washes: Care Instructions. \"  Current as of: May 12, 2017  Content Version: 11.4  © 1973-2686 PlayScape. Care instructions adapted under license by Skemaz (which disclaims liability or warranty for this information). If you have questions about a medical condition or this instruction, always ask your healthcare professional. [address] any warranty or liability for your use of this information.        Using a Nasal Steroid Spray: Care Instructions  Your Care Instructions    Your doctor may suggest using a corticosteroid nasal spray for your allergy symptoms or sinus problems. These sprays reduce the swelling inside the nose and sinuses. Unlike decongestant nasal sprays, steroid sprays won't lead to more swelling when you stop taking them. These sprays start working in a few days, but it may take several weeks before you get the full effect. Most side effects are minor. The most common complaint is a burning feeling in the nose right after the spray is used. Some people get nosebleeds. Follow-up care is a key part of your treatment and safety. Be sure to make and go to all appointments, and call your doctor if you are having problems. It's also a good idea to know your test results and keep a list of the medicines you take. How can you care for yourself at home? Here are some tips for using these sprays:  · You may need to prime the sprayer before you use it. This means spraying it into the air a few times to make sure you get the right amount of medicine. Follow the directions on the label. · Blow your nose before you spray. This will help clear out your nostrils. · Gently sniff the medicine into your nose as you spray. Don't snort, or the medicine will go all the way into your throat where it won't do much good. · Aim the nozzle straight toward the outer wall of your nostril. This will help keep the medicine from irritating the inner walls of your nose, especially your septum (the wall that separates your left and right nostrils). · Don't blow your nose for 10 minutes or so after you spray. And try not to sneeze. · Be safe with medicines. Use this medicine exactly as prescribed. Call your doctor if you think you are having a problem with your medicine. · Clean your sprayer once a week. Read the label to learn how. When should you call for help? Watch closely for changes in your health, and be sure to contact your doctor if you have any problems.   Where can you learn more? Go to http://andreia-liliane.info/. Enter C993 in the search box to learn more about \"Using a Nasal Steroid Spray: Care Instructions. \"  Current as of: May 12, 2017  Content Version: 11.4  © 2014-4606 Healthwise, Lumate. Care instructions adapted under license by CloudBolt Software (which disclaims liability or warranty for this information). If you have questions about a medical condition or this instruction, always ask your healthcare professional. [address] any warranty or liability for your use of this information.

## 2017-11-20 ENCOUNTER — HOSPITAL ENCOUNTER (OUTPATIENT)
Dept: LAB | Age: 52
Discharge: HOME | End: 2017-11-20
Payer: COMMERCIAL

## 2017-11-20 ENCOUNTER — PATIENT OUTREACH (OUTPATIENT)
Dept: FAMILY MEDICINE CLINIC | Age: 52
End: 2017-11-20

## 2017-11-20 ENCOUNTER — OFFICE VISIT (OUTPATIENT)
Dept: FAMILY MEDICINE CLINIC | Age: 52
End: 2017-11-20

## 2017-11-20 VITALS
TEMPERATURE: 96 F | WEIGHT: 302 LBS | OXYGEN SATURATION: 96 % | RESPIRATION RATE: 17 BRPM | HEART RATE: 79 BPM | HEIGHT: 68 IN | DIASTOLIC BLOOD PRESSURE: 80 MMHG | BODY MASS INDEX: 45.77 KG/M2 | SYSTOLIC BLOOD PRESSURE: 131 MMHG

## 2017-11-20 DIAGNOSIS — I10 ESSENTIAL HYPERTENSION: ICD-10-CM

## 2017-11-20 DIAGNOSIS — E78.1 HIGH TRIGLYCERIDES: ICD-10-CM

## 2017-11-20 DIAGNOSIS — E11.9 TYPE 2 DIABETES MELLITUS WITHOUT COMPLICATION, WITHOUT LONG-TERM CURRENT USE OF INSULIN (HCC): ICD-10-CM

## 2017-11-20 DIAGNOSIS — E78.5 HYPERLIPIDEMIA, UNSPECIFIED HYPERLIPIDEMIA TYPE: Primary | ICD-10-CM

## 2017-11-20 DIAGNOSIS — E78.5 HYPERLIPIDEMIA, UNSPECIFIED HYPERLIPIDEMIA TYPE: ICD-10-CM

## 2017-11-20 DIAGNOSIS — E66.01 MORBID OBESITY WITH BMI OF 45.0-49.9, ADULT (HCC): ICD-10-CM

## 2017-11-20 LAB
CHOLEST SERPL-MCNC: 186 MG/DL
HDLC SERPL-MCNC: 41 MG/DL (ref 40–60)
HDLC SERPL: 4.5 {RATIO} (ref 0–5)
LDLC SERPL CALC-MCNC: 77.2 MG/DL (ref 0–100)
LIPID PROFILE,FLP: ABNORMAL
TRIGL SERPL-MCNC: 339 MG/DL (ref ?–150)
VLDLC SERPL CALC-MCNC: 67.8 MG/DL

## 2017-11-20 PROCEDURE — 36415 COLL VENOUS BLD VENIPUNCTURE: CPT | Performed by: FAMILY MEDICINE

## 2017-11-20 PROCEDURE — 80061 LIPID PANEL: CPT | Performed by: FAMILY MEDICINE

## 2017-11-20 RX ORDER — ATORVASTATIN CALCIUM 40 MG/1
40 TABLET, FILM COATED ORAL DAILY
Qty: 30 TAB | Refills: 1 | Status: SHIPPED | OUTPATIENT
Start: 2017-11-20 | End: 2017-12-18 | Stop reason: DRUGHIGH

## 2017-11-20 RX ORDER — LISINOPRIL 10 MG/1
10 TABLET ORAL DAILY
Qty: 90 TAB | Refills: 3 | Status: SHIPPED | OUTPATIENT
Start: 2017-11-20 | End: 2017-12-18 | Stop reason: DRUGHIGH

## 2017-11-20 NOTE — PROGRESS NOTES
Health Promotion and Risk Prevention    Referred by Noel Caro for Life Style Modifications. Met with patient and his spouse during his clinic visit, Hx of being overweight all his life, successful in the past with loosing weight for hip surgey. Attributes his recent weigh gain to, not working, not exercising because of his back and eating snacks and eating supper late at night. Hx of DM and states,it is under control, recent 9/6/17 A1c was 6.4%. BMI 45.92. Finance is a barrier    Not eating 3 meals, eat out a lot, tired at the end of the day, prefer to eat out , it is quick and easy. Motivation is to spend more time being active with family. Goal: Eat less snacks, eat Healthier    Discussed importance of eating 3 meals daily, planning heathy meals, reviewed Healthy plan Method and Reviewed Let's Count Carbs and 1 Carbohydrate choice with patient and spouse. Reviewed meal ideas, portion control, importance of setting weekly goals and encourage patient to join  A program such as Weight Watcher for support.      Patient plans on attending appointment with Nutritionist and support spouse with Healthy eating

## 2017-11-20 NOTE — PROGRESS NOTES
[facility]    CC: Follow up of chronic disease management    HPI:       HTN:  - He is taking his medication as prescribed. - Denies any issues or side effects from his medication  - Diet is unrestricted  - <150 mins of physical activity a week      DMII:  - He is taking his medication as prescribed.  Denies any issues or side effects.  - Diet is unrestricted  - <150 mins of physical activity a week      HLD:  - Got requested blood work, but reports that it was likely nonfasting  - Has been taking newly prescribed medication and denies any issues or side effects  - Diet is unrestricted  - <150 mins of physical activity a week    Morbid Obesity:  - Reports difficulty losing weight  - Motivated to improve diet and improve physical activity      ROS: Positive items marked in RED  CON: fever, chills  HEENT: HA, eye pain, ear pain, sore throat  Cardiovascular: palpitations, CP  Resp: hemoptysis, SOB  GI: nausea, vomiting, abd pain  SKIN: rash, itching  : dysuria, hematuria  Neuro: numbness, tingling  Endo: polydipsia, polyuria    Past Medical History:   Diagnosis Date    Abdominal pain     Adverse effect of anesthesia     brother- BP drops after surgery-difficult to wake    Back pain     Chest pain     Diabetes mellitus type 2 in obese ([address]     Diverticulitis     Esophageal reflux     Hypertension     Kidney stones     Microscopic hematuria     Osteoarthritis     Pneumonia     Renal calculus, right     Sepsis ([address]     Snores     Swallowing difficulty     Ureteral calculi     Ureteral stent retained     Urine leukocytes        Past Surgical History:   Procedure Laterality Date    HX CHOLECYSTECTOMY      2002    HX COLONOSCOPY  02.22.2016    HX HIP REPLACEMENT  08/04/2016    HX UROLOGICAL  06/06/2016    SL-Cystoscopy, CYSTOURETHROSCOPY W/ INDWELLING URETERAL STENT INSERTION, Dr Wally Fernandez    HX UROLOGICAL  05/29/2016    SL- Cystoscopy, Right retrograde pyelography, Right 6 x 26 cm double-J ureteral stent placement,Intraoperative fluoro less than 1 hour,Interpretation of Urography, Dr Linares Sensor          Family History   Problem Relation Age of Onset    Cancer Mother      Lung    Hypertension Mother     Stroke Mother     Cancer Father      Brain       Social History     Social History    Marital status:      Spouse name: N/A    Number of children: N/A    Years of education: N/A     Social History Main Topics    Smoking status: Never Smoker    Smokeless tobacco: Never Used    Alcohol use No    Drug use: No    Sexual activity: Yes     Partners: Female     Other Topics Concern    None     Social History Narrative       Allergies   Allergen Reactions    Medrol [Methylprednisolone] Other (comments)     tachycardia  Patient states he had a severe allergic reaction about 15 years ago.  Hydrocodone-Acetaminophen Rash         Current Outpatient Prescriptions:     atorvastatin (LIPITOR) 20 mg tablet, Take 1 Tab by mouth daily. Indications: hyperlipidemia, hypertriglyceridemia, Disp: 30 Tab, Rfl: 1    lisinopril (PRINIVIL, ZESTRIL) 10 mg tablet, Take 1 Tab by mouth daily. , Disp: 90 Tab, Rfl: 3    metFORMIN (GLUCOPHAGE) 500 mg tablet, Take 1 Tab by mouth two (2) times daily (with meals). Indications: type 2 diabetes mellitus, Disp: 90 Tab, Rfl: 1    fluticasone (FLONASE) 50 mcg/actuation nasal spray, 2 Sprays by Both Nostrils route daily. , Disp: 1 Bottle, Rfl: 3    sildenafil citrate (VIAGRA) 100 mg tablet, take 1 tablet 1 hour prior to intercourse, Disp: 10 Tab, Rfl: 3    ibuprofen (MOTRIN) 800 mg tablet, Take  by mouth., Disp: , Rfl:     LORazepam (ATIVAN) 0.5 mg tablet, Take 1 Tab by mouth every eight (8) hours as needed for Anxiety. Max Daily Amount: 1.5 mg. Indications: ANXIETY, Disp: 30 Tab, Rfl: 1    oxyCODONE-acetaminophen (PERCOCET) 5-325 mg per tablet, Take 1 Tab by mouth every eight (8) hours as needed for Pain.  Max Daily Amount: 3 Tabs., Disp: 21 Tab, Rfl: 0    cyclobenzaprine (FLEXERIL) 10 mg tablet, Take 1 Tab by mouth three (3) times daily as needed for Muscle Spasm(s). Indications: MUSCLE SPASM, Disp: 30 Tab, Rfl: 0    VOLTAREN 1 % gel, Apply  to affected area every six (6) hours. , Disp: 4 g, Rfl: 1    aspirin 81 mg chewable tablet, Take 1 Tab by mouth daily. , Disp: 30 Tab, Rfl: 0    gabapentin (NEURONTIN) 300 mg capsule, Take 300 mg by mouth three (3) times daily. , Disp: , Rfl:     Physical Exam:      /80  Pulse 79  Temp 96 °F (35.6 °C) (Oral)   Resp 17  Ht 5' 8\" (1.727 m)  Wt 302 lb (137 kg)  SpO2 96%  BMI 45.92 kg/m2    General:  Obese habitus, NAD  Eyes: sclera clear bilaterally, no discharge noted, eyelids normal in appearance  HENT: NCAT  Neck: supple, no lymphadenopathy  Lungs: CTAB, Normal respiratory effort and rate  CV: RRR, no MRGs  ABD: soft, no guarding, non-distended, normal bowel sounds  Skin: normal temperature, turgor, color, and texture  Psych: alert and oriented to person, place and time, normal affect  Neuro: Speech normal, Moving all extremities, gait normal    Results for Mary Anne Wiggins (MRN [id number]):   Ref. Range 10/20/2017 09:03   Triglyceride Latest Ref Range: <150 MG/ (H)   Cholesterol, total Latest Ref Range: <200 MG/ (H)   HDL Cholesterol Latest Ref Range: 40 - 60 MG/DL 43   CHOL/HDL Ratio Latest Ref Range: 0 - 5.0   6.3 (H)   LDL, calculated Latest Ref Range: 0 - 100 MG/DL LDL AND VLDL CHOL. .. VLDL, calculated Latest Units: MG/DL Calculation not v...        Assessment/Plan   Guillermo Liao is a 50 y.o. male with a PMH significant for HTN, HLD, DMII, Morbid Obesity, presenting for follow up of chronic disease.       HLD, Inadequetly Controlled:  - Patient tolerating moderate intensity statin therapy  - Will increase to high intensity statin therapy (Atorvastatin 40mg daily), per AHA/ACC recommendations  - Will recheck lipid panel, as patient reports that it was likely nonfasting  - Follow up in one month     HTN, Mildly Elevated:  - Near goal BP of <130/80  - Will monitor trend and adjust as indicated      DMII, Well Controlled:  - At goal HGBA1c of <7  - HGBA1C was last checked at the hospital on 9/6/17. Was 6.4%  - Will refer to nurse navigator for diabetic diet counselling  - Will continue current metformin regimen.      Morbid Obesity, Improving:  - Patient counseled on improving diet and increasing physical activity  - Will refer to PT for nutrition/diet counseling  - Follow up in one month      Ángel Costello MD  11/20/2017, 12:17 PM

## 2017-11-20 NOTE — PROGRESS NOTES
1. Have you been to the ER, urgent care clinic since your last visit? Hospitalized since your last visit? No    2. Have you seen or consulted any other health care providers outside of the [address] [place] [address] since your last visit? Include any pap smears or colon screening. Yes When: Patient saw Digestive and liver disease 11-      Patient here today for a follow-up.

## 2017-11-20 NOTE — MR AVS SNAPSHOT
Visit Information Date & Time Provider Department Dept. Phone Encounter #  
 11/20/2017 11:30 AM Aman Adame, [address] [place] [address] [phone] [id number] Follow-up Instructions Return in about 1 month (around 12/20/2017). Your Appointments 11/24/2017  9:30 AM  
Follow Up with Aman Adame MD  
[facility] 23 ([address]) Appt Note: 1 MONTH FU APPT [facility] Michael. [address]  
  
   
 [address] [facility]  
  
    
 5/8/2018  9:00 AM  
ULTRASOUND with [facility]. [address] ([address] [facility]) Appt Note: Return in about 1 year (around 5/9/2018) for renal US prior. [address] [address]  
[phone]  
  
   
 [place] [id number] Upcoming Health Maintenance Date Due  
 EYE EXAM RETINAL OR DILATED Q1 1/23/1977 DTaP/Tdap/Td series (1 - Tdap) 5/12/2012 HEMOGLOBIN A1C Q6M 1/21/2017 FOBT Q 1 YEAR AGE 50-75 1/23/2017 MICROALBUMIN Q1 2/8/2017 Influenza Age 5 to Adult 8/1/2017 FOOT EXAM Q1 10/28/2017 LIPID PANEL Q1 10/20/2018 Allergies as of 11/20/2017  Review Complete On: 11/20/2017 By: Elmer Greentop Severity Noted Reaction Type Reactions Medrol [Methylprednisolone] High 08/07/2012    Other (comments)  
 tachycardia Patient states he had a severe allergic reaction about 15 years ago. Hydrocodone-acetaminophen Low 08/07/2012    Rash Current Immunizations  Reviewed on 2/17/2017 Name Date Influenza Vaccine 12/13/2015 Influenza Vaccine (Quad) PF 11/11/2016 Pneumococcal Polysaccharide (PPSV-23) 1/1/2016 Td 5/11/2012 Not reviewed this visit You Were Diagnosed With   
  
 Codes Comments Hyperlipidemia, unspecified hyperlipidemia type    -  Primary ICD-10-CM: E78.5 ICD-9-CM: 272.4 High triglycerides     ICD-10-CM: E78.1 ICD-9-CM: 272.1 Type 2 diabetes mellitus without complication, without long-term current use of insulin (HCC)     ICD-10-CM: E11.9 ICD-9-CM: 250.00 Essential hypertension     ICD-10-CM: I10 
ICD-9-CM: 401.9 Vitals BP Pulse Temp Resp Height(growth percentile) Weight(growth percentile) 131/80 79 96 °F (35.6 °C) (Oral) 17 5' 8\" (1.727 m) 302 lb (137 kg) SpO2 BMI Smoking Status 96% 45.92 kg/m2 Never Smoker Vitals History BMI and BSA Data Body Mass Index Body Surface Area 45.92 kg/m 2 2.56 m 2 Preferred Pharmacy Pharmacy Name Phone [facility], [address] [place] [address] [phone] Your Updated Medication List  
  
   
This list is accurate as of: 11/20/17 12:19 PM.  Always use your most recent med list.  
  
  
  
  
 aspirin 81 mg chewable tablet Take 1 Tab by mouth daily. atorvastatin 40 mg tablet Commonly known as:  LIPITOR Take 1 Tab by mouth daily. Indications: hyperlipidemia, hypertriglyceridemia  
  
 cyclobenzaprine 10 mg tablet Commonly known as:  FLEXERIL Take 1 Tab by mouth three (3) times daily as needed for Muscle Spasm(s). Indications: MUSCLE SPASM  
  
 fluticasone 50 mcg/actuation nasal spray Commonly known as:  Norman Metro 2 Sprays by Both Nostrils route daily. gabapentin 300 mg capsule Commonly known as:  NEURONTIN Take 300 mg by mouth three (3) times daily. ibuprofen 800 mg tablet Commonly known as:  MOTRIN Take  by mouth.  
  
 lisinopril 10 mg tablet Commonly known as:  Burr Joel Take 1 Tab by mouth daily. LORazepam 0.5 mg tablet Commonly known as:  ATIVAN Take 1 Tab by mouth every eight (8) hours as needed for Anxiety. Max Daily Amount: 1.5 mg. Indications: ANXIETY  
  
 metFORMIN 500 mg tablet Commonly known as:  GLUCOPHAGE Take 1 Tab by mouth two (2) times daily (with meals). Indications: type 2 diabetes mellitus oxyCODONE-acetaminophen 5-325 mg per tablet Commonly known as:  PERCOCET Take 1 Tab by mouth every eight (8) hours as needed for Pain. Max Daily Amount: 3 Tabs.  
  
 sildenafil citrate 100 mg tablet Commonly known as:  VIAGRA  
take 1 tablet 1 hour prior to intercourse VOLTAREN 1 % Gel Generic drug:  diclofenac Apply  to affected area every six (6) hours. Prescriptions Sent to Pharmacy Refills  
 atorvastatin (LIPITOR) 40 mg tablet 1 Sig: Take 1 Tab by mouth daily. Indications: hyperlipidemia, hypertriglyceridemia Class: Normal  
 Pharmacy: [address] Ph #: [phone] Route: Oral  
 lisinopril (PRINIVIL, ZESTRIL) 10 mg tablet 3 Sig: Take 1 Tab by mouth daily. Class: Normal  
 Pharmacy: [address] Ph #: [phone] Route: Oral  
  
We Performed the Following REFERRAL TO PHYSICAL THERAPY [CJQ48 Custom] Comments: In motion therapy for diet/nutrition counseling Follow-up Instructions Return in about 1 month (around 12/20/2017). Referral Information Referral ID Referred By Referred To  
  
 [id number] Shantal PALMER Not Available Visits Status Start Date End Date 1 New Request 11/20/17 11/20/18 If your referral has a status of pending review or denied, additional information will be sent to support the outcome of this decision. [facility] & HEALTH SERVICES! Dear Piero Aly: Thank you for requesting a [employer] account. Our records indicate that you already have an active [employer] account. You can access your account anytime at [email or web address] Did you know that you can access your hospital and ER discharge instructions at any time in [employer]? You can also review all of your test results from your hospital stay or ER visit. Additional Information If you have questions, please visit the Frequently Asked Questions section of the [employer] website at [email or web address] Remember, [employer] is NOT to be used for urgent needs. For medical emergencies, dial 911. Now available from your iPhone and Android! Please provide this summary of care documentation to your next provider. Your primary care clinician is listed as Aditi Constantino. If you have any questions after today's visit, please call [phone].

## 2017-12-18 ENCOUNTER — OFFICE VISIT (OUTPATIENT)
Dept: FAMILY MEDICINE CLINIC | Age: 52
End: 2017-12-18

## 2017-12-18 ENCOUNTER — PATIENT OUTREACH (OUTPATIENT)
Dept: FAMILY MEDICINE CLINIC | Age: 52
End: 2017-12-18

## 2017-12-18 VITALS
HEART RATE: 77 BPM | WEIGHT: 307 LBS | DIASTOLIC BLOOD PRESSURE: 99 MMHG | BODY MASS INDEX: 46.53 KG/M2 | RESPIRATION RATE: 17 BRPM | HEIGHT: 68 IN | SYSTOLIC BLOOD PRESSURE: 149 MMHG | TEMPERATURE: 97.7 F

## 2017-12-18 DIAGNOSIS — E66.01 MORBID OBESITY (HCC): ICD-10-CM

## 2017-12-18 DIAGNOSIS — I10 ESSENTIAL HYPERTENSION: ICD-10-CM

## 2017-12-18 DIAGNOSIS — E78.5 HYPERLIPIDEMIA, UNSPECIFIED HYPERLIPIDEMIA TYPE: Primary | ICD-10-CM

## 2017-12-18 RX ORDER — ATORVASTATIN CALCIUM 20 MG/1
20 TABLET, FILM COATED ORAL DAILY
Qty: 90 TAB | Refills: 3 | Status: SHIPPED | OUTPATIENT
Start: 2017-12-18 | End: 2019-01-09 | Stop reason: ALTCHOICE

## 2017-12-18 RX ORDER — LISINOPRIL 20 MG/1
20 TABLET ORAL DAILY
Qty: 90 TAB | Refills: 1 | Status: SHIPPED | OUTPATIENT
Start: 2017-12-18 | End: 2018-04-05 | Stop reason: SDUPTHER

## 2017-12-18 RX ORDER — GABAPENTIN 300 MG/1
300 CAPSULE ORAL 3 TIMES DAILY
Qty: 90 CAP | Refills: 1 | Status: SHIPPED | OUTPATIENT
Start: 2017-12-18 | End: 2018-04-05 | Stop reason: SDUPTHER

## 2017-12-18 NOTE — PROGRESS NOTES
Health Promotion and Risk Prevention    Met with patient and his wife. Gain 5 lbs since last visit in November. Attributes his weight gain to not working out due to back pain. Looking to start going to the Gym in January. Contemplating loosing weight. Patient and spouse are both knowledgeable of heathy eating and how to loose weight. Discussed exercising helps but food intake has the biggest impact on weight lost. Encouraged patient to call if he needs assistance with Life Style Modifications or encouragement.     Follow prn

## 2017-12-18 NOTE — PROGRESS NOTES
1. Have you been to the ER, urgent care clinic since your last visit? Hospitalized since your last visit? No    2. Have you seen or consulted any other health care providers outside of the [address] [place] [address] since your last visit? Include any pap smears or colon screening.  No      Patient reports he would like a refill on gabapentin

## 2017-12-18 NOTE — MR AVS SNAPSHOT
Visit Information Date & Time Provider Department Dept. Phone Encounter #  
 12/18/2017 10:00 AM Vasyl Liao, [address] [facility] [phone] [id number] Follow-up Instructions Return in about 1 month (around 1/18/2018). Your Appointments 5/8/2018  9:00 AM  
ULTRASOUND with [facility]. [address] ([address]) Appt Note: Return in about 1 year (around 5/9/2018) for renal US prior. [address] [address]  
[phone]  
  
   
 [place] [address] Upcoming Health Maintenance Date Due  
 EYE EXAM RETINAL OR DILATED Q1 1/23/1977 DTaP/Tdap/Td series (1 - Tdap) 5/12/2012 FOBT Q 1 YEAR AGE 50-75 1/23/2017 MICROALBUMIN Q1 2/8/2017 Influenza Age 5 to Adult 8/1/2017 FOOT EXAM Q1 10/28/2017 HEMOGLOBIN A1C Q6M 3/6/2018 LIPID PANEL Q1 11/20/2018 Allergies as of 12/18/2017  Review Complete On: 12/18/2017 By: Allyn Gabriel LPN Severity Noted Reaction Type Reactions Medrol [Methylprednisolone] High 08/07/2012    Other (comments)  
 tachycardia Patient states he had a severe allergic reaction about 15 years ago. Lipitor [Atorvastatin]  12/18/2017    Other (comments) Patient reports he was having muscle pain on 40 mg, but not on 20 mg Hydrocodone-acetaminophen Low 08/07/2012    Rash Current Immunizations  Reviewed on 12/18/2017 Name Date Influenza Vaccine 12/13/2015 Influenza Vaccine (Quad) PF 11/11/2016 Pneumococcal Polysaccharide (PPSV-23) 1/1/2016 Td 5/11/2012 Reviewed by Allyn Gabriel LPN on 35/12/8323 at  9:37 AM  
You Were Diagnosed With   
  
 Codes Comments Hyperlipidemia, unspecified hyperlipidemia type    -  Primary ICD-10-CM: E78.5 ICD-9-CM: 272.4 Essential hypertension     ICD-10-CM: I10 
ICD-9-CM: 401.9  Morbid obesity ([facility] [address]     ICD-10-CM: E66.01 
ICD-9-CM: 278.01   
  
 Vitals BP Pulse Temp Resp Height(growth percentile) Weight(growth percentile) (!) 149/99 77 97.7 °F (36.5 °C) (Oral) 17 5' 8\" (1.727 m) 307 lb (139.3 kg) BMI Smoking Status 46.68 kg/m2 Never Smoker Vitals History BMI and BSA Data Body Mass Index Body Surface Area  
 46.68 kg/m 2 2.59 m 2 Preferred Pharmacy Pharmacy Name Phone [facility], [address] [place] [address] [phone] Your Updated Medication List  
  
   
This list is accurate as of: 12/18/17 10:18 AM.  Always use your most recent med list.  
  
  
  
  
 aspirin 81 mg chewable tablet Take 1 Tab by mouth daily. atorvastatin 20 mg tablet Commonly known as:  LIPITOR Take 1 Tab by mouth daily. cyclobenzaprine 10 mg tablet Commonly known as:  FLEXERIL Take 1 Tab by mouth three (3) times daily as needed for Muscle Spasm(s). Indications: MUSCLE SPASM  
  
 fluticasone 50 mcg/actuation nasal spray Commonly known as:  Filbert Chard 2 Sprays by Both Nostrils route daily. gabapentin 300 mg capsule Commonly known as:  NEURONTIN Take 1 Cap by mouth three (3) times daily. ibuprofen 800 mg tablet Commonly known as:  MOTRIN Take  by mouth.  
  
 lisinopril 20 mg tablet Commonly known as:  Janae Eleanor Take 1 Tab by mouth daily. Indications: hypertension LORazepam 0.5 mg tablet Commonly known as:  ATIVAN Take 1 Tab by mouth every eight (8) hours as needed for Anxiety. Max Daily Amount: 1.5 mg. Indications: ANXIETY  
  
 metFORMIN 500 mg tablet Commonly known as:  GLUCOPHAGE Take 1 Tab by mouth two (2) times daily (with meals). Indications: type 2 diabetes mellitus MOVANTIK PO Take  by mouth. oxyCODONE-acetaminophen 5-325 mg per tablet Commonly known as:  PERCOCET Take 1 Tab by mouth every eight (8) hours as needed for Pain. Max Daily Amount: 3 Tabs.  
  
 sildenafil citrate 100 mg tablet Commonly known as:  VIAGRA  
take 1 tablet 1 hour prior to intercourse VOLTAREN 1 % Gel Generic drug:  diclofenac Apply  to affected area every six (6) hours. Prescriptions Sent to Pharmacy Refills  
 gabapentin (NEURONTIN) 300 mg capsule 1 Sig: Take 1 Cap by mouth three (3) times daily. Class: Normal  
 Pharmacy: [address] Ph #: [phone] Route: Oral  
 atorvastatin (LIPITOR) 20 mg tablet 3 Sig: Take 1 Tab by mouth daily. Class: Normal  
 Pharmacy: [address] Ph #: [phone] Route: Oral  
 lisinopril (PRINIVIL, ZESTRIL) 20 mg tablet 1 Sig: Take 1 Tab by mouth daily. Indications: hypertension Class: Normal  
 Pharmacy: [address] Ph #: [phone] Route: Oral  
  
Follow-up Instructions Return in about 1 month (around 1/18/2018). Introducing [facility] & HEALTH SERVICES! Dear Luisa Lobo: Thank you for requesting a [employer] account. Our records indicate that you already have an active [employer] account. You can access your account anytime at [email or web address] World Did you know that you can access your hospital and ER discharge instructions at any time in [employer] You can also review all of your test results from your hospital stay or ER visit. Additional Information If you have questions, please visit the Frequently Asked Questions section of the [employer] website at [email or web address] [email or web address] Remember, [employer] is NOT to be used for urgent needs. For medical emergencies, dial 911. Now available from your iPhone and Android! Please provide this summary of care documentation to your next provider. Your primary care clinician is listed as Michael Waggoner. If you have any questions after today's visit, please call [phone].

## 2017-12-18 NOTE — PROGRESS NOTES
Jsesica Miller    CC: Follow up of chronic disease management    HPI:     Obesity  - Has not been able to implement life style recommendation discussed at last visit  - Reports that he has decreased his portions. - Plans to start new diet and exercise regimen in the new year    HTN:  - He is taking his medication as prescribed.    - Denies any issues or side effects from his medication  - Diet is unrestricted  - <150 mins of physical activity a week      HLD:  - Got requested blood work  - Has been taking medication and denies any issues or side effects  - Diet is unrestricted  - <150 mins of physical activity a week       ROS: Positive items marked in RED  CON: fever, chills  Cardiovascular: palpitations, CP  Resp: cough, SOB  GI: nausea, vomiting, abdominal pain  : dysuria, hematuria      Past Medical History:   Diagnosis Date    Abdominal pain     Adverse effect of anesthesia     brother- BP drops after surgery-difficult to wake    Back pain     Chest pain     Diabetes mellitus type 2 in obese (HCC)     Diverticulitis     Esophageal reflux     Hypertension     Kidney stones     Microscopic hematuria     Osteoarthritis     Pneumonia     Renal calculus, right     Sepsis ([address]     Snores     Swallowing difficulty     Ureteral calculi     Ureteral stent retained     Urine leukocytes        Past Surgical History:   Procedure Laterality Date    HX CHOLECYSTECTOMY      2002    HX COLONOSCOPY  02.22.2016    HX HIP REPLACEMENT  08/04/2016    HX UROLOGICAL  06/06/2016    [facility]-Cystoscopy, CYSTOURETHROSCOPY W/ INDWELLING URETERAL STENT INSERTION, Dr Sakina Carty HX UROLOGICAL  05/29/2016    [facility]- Cystoscopy, Right retrograde pyelography, Right 6 x 26 cm double-J ureteral stent placement,Intraoperative fluoro less than 1 hour,Interpretation of Urography, Dr Floridalma Navarro          Family History   Problem Relation Age of Onset    Cancer Mother      Lung    Hypertension Mother     Stroke Mother  Cancer Father      Brain       Social History     Social History    Marital status:      Spouse name: N/A    Number of children: N/A    Years of education: N/A     Social History Main Topics    Smoking status: Never Smoker    Smokeless tobacco: Never Used    Alcohol use No    Drug use: No    Sexual activity: Yes     Partners: Female     Other Topics Concern    None     Social History Narrative       Allergies   Allergen Reactions    Medrol [Methylprednisolone] Other (comments)     tachycardia  Patient states he had a severe allergic reaction about 15 years ago.  Lipitor [Atorvastatin] Other (comments)     Patient reports he was having muscle pain on 40 mg, but not on 20 mg    Hydrocodone-Acetaminophen Rash         Current Outpatient Prescriptions:     NALOXEGOL OXALATE (MOVANTIK PO), Take  by mouth., Disp: , Rfl:     lisinopril (PRINIVIL, ZESTRIL) 10 mg tablet, Take 1 Tab by mouth daily. , Disp: 90 Tab, Rfl: 3    metFORMIN (GLUCOPHAGE) 500 mg tablet, Take 1 Tab by mouth two (2) times daily (with meals). Indications: type 2 diabetes mellitus, Disp: 90 Tab, Rfl: 1    fluticasone (FLONASE) 50 mcg/actuation nasal spray, 2 Sprays by Both Nostrils route daily. , Disp: 1 Bottle, Rfl: 3    sildenafil citrate (VIAGRA) 100 mg tablet, take 1 tablet 1 hour prior to intercourse, Disp: 10 Tab, Rfl: 3    ibuprofen (MOTRIN) 800 mg tablet, Take  by mouth., Disp: , Rfl:     gabapentin (NEURONTIN) 300 mg capsule, Take 300 mg by mouth three (3) times daily. , Disp: , Rfl:     LORazepam (ATIVAN) 0.5 mg tablet, Take 1 Tab by mouth every eight (8) hours as needed for Anxiety. Max Daily Amount: 1.5 mg. Indications: ANXIETY, Disp: 30 Tab, Rfl: 1    oxyCODONE-acetaminophen (PERCOCET) 5-325 mg per tablet, Take 1 Tab by mouth every eight (8) hours as needed for Pain.  Max Daily Amount: 3 Tabs., Disp: 21 Tab, Rfl: 0    cyclobenzaprine (FLEXERIL) 10 mg tablet, Take 1 Tab by mouth three (3) times daily as needed for Muscle Spasm(s). Indications: MUSCLE SPASM, Disp: 30 Tab, Rfl: 0    VOLTAREN 1 % gel, Apply  to affected area every six (6) hours. , Disp: 4 g, Rfl: 1    atorvastatin (LIPITOR) 40 mg tablet, Take 1 Tab by mouth daily. Indications: hyperlipidemia, hypertriglyceridemia, Disp: 30 Tab, Rfl: 1    aspirin 81 mg chewable tablet, Take 1 Tab by mouth daily. , Disp: 30 Tab, Rfl: 0    Physical Exam:      BP (!) 149/99  Pulse 77  Temp 97.7 °F (36.5 °C) (Oral)   Resp 17  Ht 5' 8\" (1.727 m)  Wt 307 lb (139.3 kg)  BMI 46.68 kg/m2    General:  Obese habitus, NAD  Eyes: sclera clear bilaterally, no discharge noted, eyelids normal in appearance  HENT: NCAT  Neck: supple, no lymphadenopathy  Lungs: CTAB, Normal respiratory effort and rate  CV: RRR, no MRGs  ABD: soft, no guarding, non-distended, normal bowel sounds  Skin: normal temperature, turgor, color, and texture  Psych: alert and oriented to person, place and time, normal affect  Neuro: Speech normal, Moving all extremities, gait normal    Results for Ginette Gonzalez (MRN [id number]):   Ref.  Range 11/20/2017 12:26   Triglyceride Latest Ref Range: <150 MG/ (H)   Cholesterol, total Latest Ref Range: <200 MG/   HDL Cholesterol Latest Ref Range: 40 - 60 MG/DL 41   CHOL/HDL Ratio Latest Ref Range: 0 - 5.0   4.5   LDL, calculated Latest Ref Range: 0 - 100 MG/DL 77.2   VLDL, calculated Latest Units: MG/DL 67.8       Assessment/Plan       HTN, Inadequately Controlled:  - Not at goal BP of <130/80  - Will increase lisinopril dose  - Follow up in one month      Morbid Obesity, Worsening:  - Has gained 5 pounds since last visit  - Patient encouraged to continue plan of improving diet and increasing physical activity  - Will have patient follow up with nurse navigator for counseling  - Follow up in one month    HLD, Well Controlled:  - 10 year ASCVD risk was 8.9% on 11/20/2017  - Patient tolerating high intensity statin therapy  - Will continue current high intensity statin therapy, per AHA/ACC recommendations        Kristel Bonner MD  12/18/2017, 9:48 AM

## 2017-12-21 ENCOUNTER — APPOINTMENT (OUTPATIENT)
Dept: NUTRITION | Age: 52
End: 2017-12-21

## 2018-04-06 RX ORDER — LISINOPRIL 20 MG/1
20 TABLET ORAL DAILY
Qty: 90 TAB | Refills: 1 | Status: SHIPPED | OUTPATIENT
Start: 2018-04-06 | End: 2018-07-10 | Stop reason: SDUPTHER

## 2018-04-06 RX ORDER — METFORMIN HYDROCHLORIDE 500 MG/1
500 TABLET ORAL 2 TIMES DAILY WITH MEALS
Qty: 90 TAB | Refills: 1 | Status: SHIPPED | OUTPATIENT
Start: 2018-04-06 | End: 2018-07-10 | Stop reason: SDUPTHER

## 2018-04-06 RX ORDER — GABAPENTIN 300 MG/1
300 CAPSULE ORAL 3 TIMES DAILY
Qty: 90 CAP | Refills: 1 | Status: SHIPPED | OUTPATIENT
Start: 2018-04-06 | End: 2018-07-10 | Stop reason: SDUPTHER

## 2018-04-06 NOTE — TELEPHONE ENCOUNTER
From: Lucila Walker  To:  El Stanley MD  Sent: 4/5/2018 9:56 PM EDT  Subject: Medication Renewal Request    Original authorizing provider: MD Lucila Olmstead would like a refill of the following medications:  metFORMIN (GLUCOPHAGE) 500 mg tablet El Stanley MD]  gabapentin (NEURONTIN) 300 mg capsule El Stanley MD]  lisinopril (PRINIVIL, ZESTRIL) 20 mg tablet El Stanley MD]    [facility]: [facility] [place] [address] [address]    Comment:  Last appt was 12-18-17  Next appt is None scheduled

## 2018-08-01 ENCOUNTER — OFFICE VISIT (OUTPATIENT)
Dept: FAMILY MEDICINE CLINIC | Age: 53
End: 2018-08-01

## 2018-08-01 ENCOUNTER — HOSPITAL ENCOUNTER (OUTPATIENT)
Dept: LAB | Age: 53
Discharge: HOME | End: 2018-08-01
Payer: SELF-PAY

## 2018-08-01 VITALS
TEMPERATURE: 97.4 F | OXYGEN SATURATION: 94 % | RESPIRATION RATE: 16 BRPM | HEART RATE: 88 BPM | BODY MASS INDEX: 47.74 KG/M2 | SYSTOLIC BLOOD PRESSURE: 133 MMHG | DIASTOLIC BLOOD PRESSURE: 80 MMHG | HEIGHT: 68 IN | WEIGHT: 315 LBS

## 2018-08-01 DIAGNOSIS — E11.9 TYPE 2 DIABETES MELLITUS WITHOUT COMPLICATION, WITHOUT LONG-TERM CURRENT USE OF INSULIN (HCC): ICD-10-CM

## 2018-08-01 DIAGNOSIS — M54.12 CERVICAL RADICULOPATHY: ICD-10-CM

## 2018-08-01 DIAGNOSIS — I10 ESSENTIAL HYPERTENSION: ICD-10-CM

## 2018-08-01 DIAGNOSIS — I10 ESSENTIAL HYPERTENSION: Primary | ICD-10-CM

## 2018-08-01 LAB
EST. AVERAGE GLUCOSE BLD GHB EST-MCNC: 160 MG/DL
HBA1C MFR BLD: 7.2 % (ref 4.2–5.6)

## 2018-08-01 PROCEDURE — 82043 UR ALBUMIN QUANTITATIVE: CPT | Performed by: FAMILY MEDICINE

## 2018-08-01 PROCEDURE — 36415 COLL VENOUS BLD VENIPUNCTURE: CPT | Performed by: FAMILY MEDICINE

## 2018-08-01 PROCEDURE — 83036 HEMOGLOBIN GLYCOSYLATED A1C: CPT | Performed by: FAMILY MEDICINE

## 2018-08-01 RX ORDER — AMOXICILLIN 500 MG/1
TABLET, FILM COATED ORAL
COMMUNITY
End: 2019-02-08 | Stop reason: ALTCHOICE

## 2018-08-01 RX ORDER — LISINOPRIL 40 MG/1
40 TABLET ORAL DAILY
Qty: 90 TAB | Refills: 1 | Status: SHIPPED | OUTPATIENT
Start: 2018-08-01 | End: 2018-12-14 | Stop reason: SDUPTHER

## 2018-08-01 RX ORDER — GABAPENTIN 300 MG/1
300 CAPSULE ORAL 3 TIMES DAILY
Qty: 30 CAP | Refills: 0 | Status: SHIPPED | OUTPATIENT
Start: 2018-08-01 | End: 2018-10-05 | Stop reason: SDUPTHER

## 2018-08-01 RX ORDER — IBUPROFEN 800 MG/1
800 TABLET ORAL
Qty: 180 TAB | Refills: 1 | Status: SHIPPED | OUTPATIENT
Start: 2018-08-01 | End: 2018-12-14 | Stop reason: SDUPTHER

## 2018-08-01 NOTE — PATIENT INSTRUCTIONS
Pinched Nerve in the Neck: Care Instructions  Your Care Instructions  A pinched nerve in the neck happens when a vertebra or disc in the upper part of your spine is damaged. This damage can happen because of an injury. Or it can just happen with age. The changes caused by the damage may put pressure on a nearby nerve root, pinching it. This causes symptoms such as sharp pain in your neck, shoulder, arm, hand, or back. You may also have tingling or numbness. Sometimes it makes your arm weaker. The symptoms are usually worse when you turn your head or strain your neck. For many people, the symptoms get better over time and finally go away. Early treatment usually includes medicines for pain and swelling. Sometimes physical therapy and special exercises may help. Follow-up care is a key part of your treatment and safety. Be sure to make and go to all appointments, and call your doctor if you are having problems. It's also a good idea to know your test results and keep a list of the medicines you take. How can you care for yourself at home? · Be safe with medicines. Read and follow all instructions on the label. ¨ If the doctor gave you a prescription medicine for pain, take it as prescribed. ¨ If you are not taking a prescription pain medicine, ask your doctor if you can take an over-the-counter medicine. · Try using a heating pad on a low or medium setting for 15 to 20 minutes every 2 or 3 hours. Try a warm shower in place of one session with the heating pad. You can also buy single-use heat wraps that last up to 8 hours. · You can also try an ice pack for 10 to 15 minutes every 2 to 3 hours. There isn't strong evidence that either heat or ice will help. But you can try them to see if they help you. · Don't spend too long in one position. Take short breaks to move around and change positions. · Wear a seat belt and shoulder harness when you are in a car.   · Sleep with a pillow under your head and neck that keeps your neck straight. · If you were given a neck brace (cervical collar) to limit neck motion, wear it as instructed for as many days as your doctor tells you to. Do not wear it longer than you were told to. Wearing a brace for too long can lead to neck stiffness and can weaken the neck muscles. · Follow your doctor's instructions for gentle neck-stretching exercises. · Do not smoke. Smoking can slow healing of your discs. If you need help quitting, talk to your doctor about stop-smoking programs and medicines. These can increase your chances of quitting for good. · Avoid strenuous work or exercise until your doctor says it is okay. When should you call for help? Call 911 anytime you think you may need emergency care. For example, call if:    · You are unable to move an arm or a leg at all.   [facility] your doctor now or seek immediate medical care if:    · You have new or worse symptoms in your arms, legs, chest, belly, or buttocks. Symptoms may include:  ¨ Numbness or tingling. ¨ Weakness. ¨ Pain.     · You lose bladder or bowel control.    Watch closely for changes in your health, and be sure to contact your doctor if:    · You are not getting better as expected. Where can you learn more? Go to http://andreia-liliane.info/. Enter C069 in the search box to learn more about \"Pinched Nerve in the Neck: Care Instructions. \"  Current as of: November 29, 2017  Content Version: 11.7  © 9720-2795 [employer]. Care instructions adapted under license by [employer] (which disclaims liability or warranty for this information). If you have questions about a medical condition or this instruction, always ask your healthcare professional. [place] [address] any warranty or liability for your use of this information.
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
## 2018-08-01 NOTE — MR AVS SNAPSHOT
Edenilson Gee [address] [facility] [address] [facility] [address] [id number] 
[phone] Patient: Mukund Kearns MRN: [id number] :[id number] Visit Information Date & Time Provider Department Dept. Phone Encounter #  
 2018  1:45 PM Vasyl Liao, [address] [id number] Follow-up Instructions Return if symptoms worsen or fail to improve. Upcoming Health Maintenance Date Due  
 EYE EXAM RETINAL OR DILATED Q1 1977 DTaP/Tdap/Td series (1 - Tdap) 2012 FOBT Q 1 YEAR AGE 50-75 2017 MICROALBUMIN Q1 2017 FOOT EXAM Q1 10/28/2017 HEMOGLOBIN A1C Q6M 3/6/2018 Influenza Age 5 to Adult 2018 LIPID PANEL Q1 2018 Allergies as of 2018  Review Complete On: 2018 By: Trinidad Fernando LPN Severity Noted Reaction Type Reactions Medrol [Methylprednisolone] High 2012    Other (comments)  
 tachycardia Patient states he had a severe allergic reaction about 15 years ago. Lipitor [Atorvastatin]  2017    Other (comments) Patient reports he was having muscle pain on 40 mg, but not on 20 mg Hydrocodone-acetaminophen Low 2012    Rash Current Immunizations  Reviewed on 2017 Name Date Influenza Vaccine 2015 Influenza Vaccine (Quad) PF 2016 Pneumococcal Polysaccharide (PPSV-23) 2016 Td 2012 Not reviewed this visit You Were Diagnosed With   
  
 Codes Comments Essential hypertension    -  Primary ICD-10-CM: I10 
ICD-9-CM: 401.9 Type 2 diabetes mellitus without complication, without long-term current use of insulin (HCC)     ICD-10-CM: E11.9 ICD-9-CM: 250.00 Cervical radiculopathy     ICD-10-CM: M54.12 
ICD-9-CM: 723.4 Vitals BP Pulse Temp Resp Height(growth percentile) Weight(growth percentile)  133/80 (BP 1 Location: Left arm, BP Patient Position: Sitting) 88 97.4 °F (36.3 °C) (Oral) 16 5' 8\" (1.727 m) 315 lb 12.8 oz (143.2 kg) SpO2 BMI Smoking Status 94% 48.02 kg/m2 Never Smoker Vitals History BMI and BSA Data Body Mass Index Body Surface Area 48.02 kg/m 2 2.62 m 2 Preferred Pharmacy Pharmacy Name Phone [facility], [address] [place] [address] [phone] Your Updated Medication List  
  
   
This list is accurate as of 8/1/18  2:28 PM.  Always use your most recent med list.  
  
  
  
  
 amoxicillin 500 mg Tab Take  by mouth. aspirin 81 mg chewable tablet Take 1 Tab by mouth daily. atorvastatin 20 mg tablet Commonly known as:  LIPITOR Take 1 Tab by mouth daily. cyclobenzaprine 10 mg tablet Commonly known as:  FLEXERIL Take 1 Tab by mouth three (3) times daily as needed for Muscle Spasm(s). Indications: MUSCLE SPASM  
  
 fluticasone 50 mcg/actuation nasal spray Commonly known as:  Jose Sacks 2 Sprays by Both Nostrils route daily. gabapentin 300 mg capsule Commonly known as:  NEURONTIN Take 1 Cap by mouth three (3) times daily. ibuprofen 800 mg tablet Commonly known as:  MOTRIN Take 1 Tab by mouth every six (6) hours as needed for Pain. lisinopril 40 mg tablet Commonly known as:  [place] Take 1 Tab by mouth daily. LORazepam 0.5 mg tablet Commonly known as:  ATIVAN Take 1 Tab by mouth every eight (8) hours as needed for Anxiety. Max Daily Amount: 1.5 mg. Indications: ANXIETY  
  
 metFORMIN 500 mg tablet Commonly known as:  GLUCOPHAGE Take 1 Tab by mouth two (2) times daily (with meals). Indications: type 2 diabetes mellitus MOVANTIK PO Take  by mouth. oxyCODONE-acetaminophen 5-325 mg per tablet Commonly known as:  PERCOCET Take 1 Tab by mouth every eight (8) hours as needed for Pain. Max Daily Amount: 3 Tabs.  
  
 sildenafil citrate 100 mg tablet Commonly known as:  VIAGRA take 1 tablet 1 hour prior to intercourse VOLTAREN 1 % Gel Generic drug:  diclofenac Apply  to affected area every six (6) hours. Prescriptions Sent to Pharmacy Refills  
 lisinopril (PRINIVIL, ZESTRIL) 40 mg tablet 1 Sig: Take 1 Tab by mouth daily. Class: Normal  
 Pharmacy: [facility] [address], [facility] Ph #: [phone] Route: Oral  
 ibuprofen (MOTRIN) 800 mg tablet 1 Sig: Take 1 Tab by mouth every six (6) hours as needed for Pain. Class: Normal  
 Pharmacy: [facility] [address], [facility] Ph #: [phone] Route: Oral  
 gabapentin (NEURONTIN) 300 mg capsule 0 Sig: Take 1 Cap by mouth three (3) times daily. Class: Normal  
 Pharmacy: [facility] [address], [facility] Ph #: [phone] Route: Oral  
  
Follow-up Instructions Return if symptoms worsen or fail to improve. To-Do List   
 08/01/2018 Lab:  HEMOGLOBIN A1C WITH EAG   
  
 08/01/2018 Lab:  MICROALBUMIN, UR, RAND W/ MICROALB/CREAT RATIO Patient Instructions Pinched Nerve in the Neck: Care Instructions Your Care Instructions A pinched nerve in the neck happens when a vertebra or disc in the upper part of your spine is damaged. This damage can happen because of an injury. Or it can just happen with age. The changes caused by the damage may put pressure on a nearby nerve root, pinching it. This causes symptoms such as sharp pain in your neck, shoulder, arm, hand, or back. You may also have tingling or numbness. Sometimes it makes your arm weaker. The symptoms are usually worse when you turn your head or strain your neck. For many people, the symptoms get better over time and finally go away. Early treatment usually includes medicines for pain and swelling. Sometimes physical therapy and special exercises may help. Follow-up care is a key part of your treatment and safety.  Be sure to make and go to all appointments, and call your doctor if you are having problems. It's also a good idea to know your test results and keep a list of the medicines you take. How can you care for yourself at home? · Be safe with medicines. Read and follow all instructions on the label. ¨ If the doctor gave you a prescription medicine for pain, take it as prescribed. ¨ If you are not taking a prescription pain medicine, ask your doctor if you can take an over-the-counter medicine. · Try using a heating pad on a low or medium setting for 15 to 20 minutes every 2 or 3 hours. Try a warm shower in place of one session with the heating pad. You can also buy single-use heat wraps that last up to 8 hours. · You can also try an ice pack for 10 to 15 minutes every 2 to 3 hours. There isn't strong evidence that either heat or ice will help. But you can try them to see if they help you. · Don't spend too long in one position. Take short breaks to move around and change positions. · Wear a seat belt and shoulder harness when you are in a car. · Sleep with a pillow under your head and neck that keeps your neck straight. · If you were given a neck brace (cervical collar) to limit neck motion, wear it as instructed for as many days as your doctor tells you to. Do not wear it longer than you were told to. Wearing a brace for too long can lead to neck stiffness and can weaken the neck muscles. · Follow your doctor's instructions for gentle neck-stretching exercises. · Do not smoke. Smoking can slow healing of your discs. If you need help quitting, talk to your doctor about stop-smoking programs and medicines. These can increase your chances of quitting for good. · Avoid strenuous work or exercise until your doctor says it is okay. When should you call for help? Call 911 anytime you think you may need emergency care. For example, call if: 
  · You are unable to move an arm or a leg at all.  Call your doctor now or seek immediate medical care if: 
  · You have new or worse symptoms in your arms, legs, chest, belly, or buttocks. Symptoms may include: ¨ Numbness or tingling. ¨ Weakness. ¨ Pain.  
  · You lose bladder or bowel control.  
 Watch closely for changes in your health, and be sure to contact your doctor if: 
  · You are not getting better as expected. Where can you learn more? Go to http://andreia-liliane.info/. Enter N401 in the search box to learn more about \"Pinched Nerve in the Neck: Care Instructions. \" Current as of: November 29, 2017 Content Version: 11.7 © 0734-6291 [employer]. Care instructions adapted under license by [employer] (which disclaims liability or warranty for this information). If you have questions about a medical condition or this instruction, always ask your healthcare professional. [place] [address] any warranty or liability for your use of this information. Introducing [facility]! Dear Osmar Cortez: Thank you for requesting a [employer] account. Our records indicate that you already have an active [employer] account. You can access your account anytime at [email or web address] Did you know that you can access your hospital and ER discharge instructions at any time in [employer]? You can also review all of your test results from your hospital stay or ER visit. Additional Information If you have questions, please visit the Frequently Asked Questions section of the [employer] website at [email or web address] Remember, [employer] is NOT to be used for urgent needs. For medical emergencies, dial 911. Now available from your iPhone and Android! Please provide this summary of care documentation to your next provider. Your primary care clinician is listed as Michelle Lugo.  If you have any questions after today's visit, please call [phone].

## 2018-08-02 LAB
CREAT UR-MCNC: 145.02 MG/DL (ref 30–125)
MICROALBUMIN UR-MCNC: 0.5 MG/DL (ref 0–3)
MICROALBUMIN/CREAT UR-RTO: 3 MG/G (ref 0–30)

## 2018-08-03 DIAGNOSIS — M62.838 MUSCLE SPASM: Primary | ICD-10-CM

## 2018-08-03 RX ORDER — CYCLOBENZAPRINE HCL 10 MG
10 TABLET ORAL
Qty: 30 TAB | Refills: 1 | Status: SHIPPED | OUTPATIENT
Start: 2018-08-03 | End: 2018-12-14 | Stop reason: SDUPTHER

## 2018-08-06 DIAGNOSIS — E11.9 TYPE 2 DIABETES MELLITUS WITHOUT COMPLICATION, WITHOUT LONG-TERM CURRENT USE OF INSULIN (HCC): Primary | ICD-10-CM

## 2018-08-06 RX ORDER — METFORMIN HYDROCHLORIDE 500 MG/1
500 TABLET ORAL
Qty: 30 TAB | Refills: 0 | Status: SHIPPED | OUTPATIENT
Start: 2018-08-06 | End: 2018-08-06 | Stop reason: SDUPTHER

## 2018-08-06 RX ORDER — METFORMIN HYDROCHLORIDE 500 MG/1
500 TABLET ORAL
Qty: 90 TAB | Refills: 3 | Status: SHIPPED | OUTPATIENT
Start: 2018-08-06 | End: 2018-08-07 | Stop reason: SDUPTHER

## 2018-08-07 DIAGNOSIS — E11.9 TYPE 2 DIABETES MELLITUS WITHOUT COMPLICATION, WITHOUT LONG-TERM CURRENT USE OF INSULIN (HCC): ICD-10-CM

## 2018-08-07 RX ORDER — METFORMIN HYDROCHLORIDE 500 MG/1
500 TABLET ORAL
Qty: 90 TAB | Refills: 3 | Status: SHIPPED | OUTPATIENT
Start: 2018-08-07 | End: 2018-12-14 | Stop reason: SDUPTHER

## 2018-08-07 NOTE — TELEPHONE ENCOUNTER
Spoke with pharmacist and he stated that the pharmacy did not receive the request to fill patient's Metformin for a dispense of 90 with 3 refills.  Please resend script to pharmacy

## 2018-08-15 ENCOUNTER — OFFICE VISIT (OUTPATIENT)
Dept: FAMILY MEDICINE CLINIC | Age: 53
End: 2018-08-15

## 2018-08-15 VITALS
OXYGEN SATURATION: 96 % | DIASTOLIC BLOOD PRESSURE: 76 MMHG | TEMPERATURE: 98.1 F | HEIGHT: 68 IN | SYSTOLIC BLOOD PRESSURE: 122 MMHG | RESPIRATION RATE: 12 BRPM | WEIGHT: 308.2 LBS | HEART RATE: 90 BPM | BODY MASS INDEX: 46.71 KG/M2

## 2018-08-15 DIAGNOSIS — R10.31 RIGHT LOWER QUADRANT ABDOMINAL PAIN: Primary | ICD-10-CM

## 2018-08-15 DIAGNOSIS — E11.9 CONTROLLED TYPE 2 DIABETES MELLITUS WITHOUT COMPLICATION, WITHOUT LONG-TERM CURRENT USE OF INSULIN (HCC): ICD-10-CM

## 2018-08-15 NOTE — MR AVS SNAPSHOT
Edenilson Talavera [place] [address] [facility] Michael. [address] [facility] 83 [address] [phone] Patient: Sara Acosta MRN: [id number] :[id number] Visit Information Date & Time Provider Department Dept. Phone Encounter #  
 8/15/2018  1:15 PM Lorena Avalos, [facility]5 [facility] [phone] [id number] Follow-up Instructions Return if symptoms worsen or fail to improve. Upcoming Health Maintenance Date Due  
 EYE EXAM RETINAL OR DILATED Q1 1977 DTaP/Tdap/Td series (1 - Tdap) 2012 FOBT Q 1 YEAR AGE 50-75 2017 FOOT EXAM Q1 10/28/2017 Influenza Age 5 to Adult 2018 LIPID PANEL Q1 2018 HEMOGLOBIN A1C Q6M 2019 MICROALBUMIN Q1 2019 Allergies as of 8/15/2018  Review Complete On: 8/15/2018 By: Lorena Avalos NP Severity Noted Reaction Type Reactions Medrol [Methylprednisolone] High 2012    Other (comments)  
 tachycardia Patient states he had a severe allergic reaction about 15 years ago. Lipitor [Atorvastatin]  2017    Other (comments) Patient reports he was having muscle pain on 40 mg, but not on 20 mg Hydrocodone-acetaminophen Low 2012    Rash Current Immunizations  Reviewed on 2017 Name Date Influenza Vaccine 2015 Influenza Vaccine (Quad) PF 2016 Pneumococcal Polysaccharide (PPSV-23) 2016 Td 2012 Not reviewed this visit You Were Diagnosed With   
  
 Codes Comments Right lower quadrant abdominal pain    -  Primary ICD-10-CM: R10.31 ICD-9-CM: 789.03 Vitals BP Pulse Temp Resp Height(growth percentile) Weight(growth percentile) 122/76 (BP 1 Location: Left arm, BP Patient Position: Sitting) 90 98.1 °F (36.7 °C) (Oral) 12 5' 8\" (1.727 m) 308 lb 3.2 oz (139.8 kg) SpO2 BMI Smoking Status 96% 46.86 kg/m2 Never Smoker Vitals History BMI and BSA Data Body Mass Index Body Surface Area  
 46.86 kg/m 2 2.59 m 2 Preferred Pharmacy Pharmacy Name Phone [facility], [address] [place] [address] [phone] Your Updated Medication List  
  
   
This list is accurate as of 8/15/18  1:55 PM.  Always use your most recent med list.  
  
  
  
  
 amoxicillin 500 mg Tab Take  by mouth. aspirin 81 mg chewable tablet Take 1 Tab by mouth daily. atorvastatin 20 mg tablet Commonly known as:  LIPITOR Take 1 Tab by mouth daily. cyclobenzaprine 10 mg tablet Commonly known as:  FLEXERIL Take 1 Tab by mouth three (3) times daily as needed for Muscle Spasm(s). Indications: Muscle Spasm  
  
 fluticasone 50 mcg/actuation nasal spray Commonly known as:  Cathlyn Dereck 2 Sprays by Both Nostrils route daily. gabapentin 300 mg capsule Commonly known as:  NEURONTIN Take 1 Cap by mouth three (3) times daily. ibuprofen 800 mg tablet Commonly known as:  MOTRIN Take 1 Tab by mouth every six (6) hours as needed for Pain. lisinopril 40 mg tablet Commonly known as:  Gabby Needy Take 1 Tab by mouth daily. LORazepam 0.5 mg tablet Commonly known as:  ATIVAN Take 1 Tab by mouth every eight (8) hours as needed for Anxiety. Max Daily Amount: 1.5 mg. Indications: ANXIETY  
  
 metFORMIN 500 mg tablet Commonly known as:  GLUCOPHAGE Take 1 Tab by mouth three (3) times daily (with meals). Indications: type 2 diabetes mellitus MOVANTIK PO Take  by mouth. oxyCODONE-acetaminophen 5-325 mg per tablet Commonly known as:  PERCOCET Take 1 Tab by mouth every eight (8) hours as needed for Pain. Max Daily Amount: 3 Tabs.  
  
 sildenafil citrate 100 mg tablet Commonly known as:  VIAGRA  
take 1 tablet 1 hour prior to intercourse VOLTAREN 1 % Gel Generic drug:  diclofenac Apply  to affected area every six (6) hours. Follow-up Instructions Return if symptoms worsen or fail to improve. Patient Instructions Mechanism of Action Decreases liver glucose production, decreasing intestinal absorption of glucose and improves insulin sensitivity (increases peripheral glucose uptake and utilization) Possible Appendicitis: Care Instructions Your Care Instructions Your doctor thinks you may have appendicitis. This means that your appendix may be infected. The appendix is a small sac that is shaped like a finger. It's attached to your large intestine. Sometimes it's hard to tell if a person has appendicitis. It is especially hard to tell in children, pregnant women, and older adults. If your doctor thinks it's possible that you have appendicitis, he or she may want to order more tests. Or your doctor may want to wait to see if your symptoms change. Your doctor thinks it's okay for you to go home right now. But you will need to watch for symptoms of appendicitis at home. If your symptoms continue or get worse, it's important to call your doctor or get medical care right away. Appendicitis can get serious very quickly. The main treatment is surgery to remove your appendix. Follow-up care is a key part of your treatment and safety. Be sure to make and go to all appointments, and call your doctor if you are having problems. It's also a good idea to know your test results and keep a list of the medicines you take. How can you care for yourself at home? · Do not eat or drink, unless your doctor says it is okay. If you need surgery, it's best to have an empty stomach. If you're thirsty, you can rinse your mouth with water. Or you can suck on hard candy. · Do not take laxatives. If you have appendicitis, they can make the appendix burst. 
· Follow your doctor's instructions about taking medicines. Your doctor may tell you not to take antibiotics or pain pills. These medicines can make it harder to tell if you have appendicitis. · Watch for symptoms of appendicitis. See the When should you call for help section below. It is very important to follow your doctor's instructions about getting treatment if you have these symptoms. When should you call for help? Call your doctor now or seek immediate medical care if: 
  · You have pain that becomes focused on one area of your belly.  
  · You have new or worse belly pain.  
  · You are vomiting.  
  · You have a fever.  
  · You cannot pass stools or gas.  
 Watch closely for changes in your health, and be sure to contact your doctor if: 
  · You do not get better as expected. Where can you learn more? Go to http://andreia-liliane.info/. Enter J450 in the search box to learn more about \"Possible Appendicitis: Care Instructions. \" Current as of: May 12, 2017 Content Version: 11.7 © 9768-7261 [employer]. Care instructions adapted under license by [employer] (which disclaims liability or warranty for this information). If you have questions about a medical condition or this instruction, always ask your healthcare professional. [address] any warranty or liability for your use of this information. Introducing [facility]! Dear Eliza Stewart: Thank you for requesting a Intertwine account. Our records indicate that you already have an active Intertwine account. You can access your account anytime at [email or web address] Did you know that you can access your hospital and ER discharge instructions at any time in Intertwine? You can also review all of your test results from your hospital stay or ER visit. Additional Information If you have questions, please visit the Frequently Asked Questions section of the Intertwine website at [email or web address] Remember, Intertwine is NOT to be used for urgent needs. For medical emergencies, dial 911. Now available from your iPhone and Android! Please provide this summary of care documentation to your next provider. Your primary care clinician is listed as Neeru Nelson. If you have any questions after today's visit, please call [phone].

## 2018-08-15 NOTE — PATIENT INSTRUCTIONS
Mechanism of Action   Decreases liver glucose production, decreasing intestinal absorption of glucose and improves insulin sensitivity (increases peripheral glucose uptake and utilization)       Possible Appendicitis: Care Instructions  Your Care Instructions    Your doctor thinks you may have appendicitis. This means that your appendix may be infected. The appendix is a small sac that is shaped like a finger. It's attached to your large intestine. Sometimes it's hard to tell if a person has appendicitis. It is especially hard to tell in children, pregnant women, and older adults. If your doctor thinks it's possible that you have appendicitis, he or she may want to order more tests. Or your doctor may want to wait to see if your symptoms change. Your doctor thinks it's okay for you to go home right now. But you will need to watch for symptoms of appendicitis at home. If your symptoms continue or get worse, it's important to call your doctor or get medical care right away. Appendicitis can get serious very quickly. The main treatment is surgery to remove your appendix. Follow-up care is a key part of your treatment and safety. Be sure to make and go to all appointments, and call your doctor if you are having problems. It's also a good idea to know your test results and keep a list of the medicines you take. How can you care for yourself at home? · Do not eat or drink, unless your doctor says it is okay. If you need surgery, it's best to have an empty stomach. If you're thirsty, you can rinse your mouth with water. Or you can suck on hard candy. · Do not take laxatives. If you have appendicitis, they can make the appendix burst.  · Follow your doctor's instructions about taking medicines. Your doctor may tell you not to take antibiotics or pain pills. These medicines can make it harder to tell if you have appendicitis. · Watch for symptoms of appendicitis. See the When should you call for help section below.  It is very important to follow your doctor's instructions about getting treatment if you have these symptoms. When should you call for help? Call your doctor now or seek immediate medical care if:    · You have pain that becomes focused on one area of your belly.     · You have new or worse belly pain.     · You are vomiting.     · You have a fever.     · You cannot pass stools or gas.    Watch closely for changes in your health, and be sure to contact your doctor if:    · You do not get better as expected. Where can you learn more? Go to http://andreia-liliane.info/. Enter B047 in the search box to learn more about \"Possible Appendicitis: Care Instructions. \"  Current as of: May 12, 2017  Content Version: 11.7  © 3158-7988 [employer]. Care instructions adapted under license by [employer] (which disclaims liability or warranty for this information). If you have questions about a medical condition or this instruction, always ask your healthcare professional. [address] any warranty or liability for your use of this information.

## 2018-08-15 NOTE — PROGRESS NOTES
Subjective:   Kwabena Perez is a 48 y.o. male here for an acute visit for    Chief Complaint   Patient presents with   [facility] Follow Up       HPI    Went to the ED on  for RLQ abd pain \"so bad could barely get up\". A CT scan of the abd and pelvis was performed. Mr. Edgar Coffey was told he has a hernia in his LLQ and was referred to Dr. Mike Li with [address] Onset    Location rlq pelvis   Duration constant   Characteristics From stabbing to dull   Aggrevating Movement, strain   Relieving mobic   Treatment mobic   Pertinent PMH On percocet BID for chronic bach pain   Pertinent negatives Fever, chills, diaphoresis, n/v/c/d     Diabetes  glucose fastin,161, 143   Metformin increase at his last office visit    ROS  As above, the rest are negative   ROS    Current Outpatient Prescriptions   Medication Sig Dispense Refill    metFORMIN (GLUCOPHAGE) 500 mg tablet Take 1 Tab by mouth three (3) times daily (with meals). Indications: type 2 diabetes mellitus 90 Tab 3    cyclobenzaprine (FLEXERIL) 10 mg tablet Take 1 Tab by mouth three (3) times daily as needed for Muscle Spasm(s). Indications: Muscle Spasm 30 Tab 1    amoxicillin 500 mg tab Take  by mouth.  lisinopril (PRINIVIL, ZESTRIL) 40 mg tablet Take 1 Tab by mouth daily. 90 Tab 1    ibuprofen (MOTRIN) 800 mg tablet Take 1 Tab by mouth every six (6) hours as needed for Pain. 180 Tab 1    gabapentin (NEURONTIN) 300 mg capsule Take 1 Cap by mouth three (3) times daily. 30 Cap 0    NALOXEGOL OXALATE (MOVANTIK PO) Take  by mouth.  atorvastatin (LIPITOR) 20 mg tablet Take 1 Tab by mouth daily. 90 Tab 3    fluticasone (FLONASE) 50 mcg/actuation nasal spray 2 Sprays by Both Nostrils route daily. 1 Bottle 3    sildenafil citrate (VIAGRA) 100 mg tablet take 1 tablet 1 hour prior to intercourse 10 Tab 3    LORazepam (ATIVAN) 0.5 mg tablet Take 1 Tab by mouth every eight (8) hours as needed for Anxiety.  Max Daily Amount: 1.5 mg. Indications: ANXIETY 30 Tab 1    oxyCODONE-acetaminophen (PERCOCET) 5-325 mg per tablet Take 1 Tab by mouth every eight (8) hours as needed for Pain. Max Daily Amount: 3 Tabs. 21 Tab 0    VOLTAREN 1 % gel Apply  to affected area every six (6) hours. 4 g 1    aspirin 81 mg chewable tablet Take 1 Tab by mouth daily. 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  Lipitor [Atorvastatin] Other (comments)     Patient reports he was having muscle pain on 40 mg, but not on 20 mg    Hydrocodone-Acetaminophen Rash     Family History   Problem Relation Age of Onset   [de-identified] Cancer Mother      Lung    Hypertension Mother     Stroke Mother     Cancer Father      Brain       Objective:     Vitals:    08/15/18 1320   BP: 122/76   Pulse: 90   Resp: 12   Temp: 98.1 °F (36.7 °C)   TempSrc: Oral   SpO2: 96%   Weight: 308 lb 3.2 oz (139.8 kg)   Height: 5' 8\" (1.727 m)     Body mass index is 46.86 kg/(m^2). Physical Exam  Physical Exam   Constitutional: He is oriented to person, place, and time and well-developed, well-nourished, and in no distress. Cardiovascular: Normal rate, regular rhythm and normal heart sounds. Pulmonary/Chest: Effort normal and breath sounds normal.   Abdominal: Normal appearance. There is tenderness in the right lower quadrant and left lower quadrant. There is no rebound and no tenderness at McBurney's point. Neurological: He is alert and oriented to person, place, and time. Gait normal.   Skin: Skin is warm and dry. Nursing note and vitals reviewed. Labwork and Ancillary Studies:    CBC w/Diff  No results found for: WBC, WBCLT, HGB, HGBP, PLT, HGBEXT, PLTEXT, HGBEXT, PLTEXT      Basic Metabolic Profile  Lab Results   Component Value Date/Time    Sodium 140 03/25/2016 12:19 PM    Potassium 4.2 03/25/2016 12:19 PM    Chloride 103 03/25/2016 12:19 PM    CO2 22 03/25/2016 12:19 PM    Glucose 140 (H) 03/25/2016 12:19 PM    BUN 11 03/25/2016 12:19 PM    Creatinine 0.85 03/25/2016 12:19 PM    BUN/Creatinine ratio 13 03/25/2016 12:19 PM    GFR est  03/25/2016 12:19 PM    GFR est non- 03/25/2016 12:19 PM    Calcium 9.1 03/25/2016 12:19 PM        Cholesterol  Lab Results   Component Value Date/Time    Cholesterol, total 186 11/20/2017 12:26 PM    HDL Cholesterol 41 11/20/2017 12:26 PM    LDL, calculated 77.2 11/20/2017 12:26 PM    Triglyceride 339 (H) 11/20/2017 12:26 PM    CHOL/HDL Ratio 4.5 11/20/2017 12:26 PM      Mr. Peterson Felix is concerned about some of the findings on the CT scan that was not relayed to him. CT scan results:  PELVIS FINDINGS:   Bowel:Small bowel: Normal in caliber with normal wall thickness. Large bowel: Mild diverticulosis. There is some fat stranding near the proximal sigmoid colon, but no definite wall thickening. Smith Junk Appendix: Secondary signs of acute appendicitis. Bladder:Normal.  There is a left lower quadrant fat containing area at the prior left lower quadrant incision which is mildly inflamed. This could represent an acute incisional hernia. The hernia sac measures 9.2 x 4.2 cm. There is some scar tissue in the overlying subcutaneous tissues. Mildly inflamed fat containing periumbilical hernia. Bones: Left hip arthroplasty. Multilevel degenerative changes of the spine. Specifically the appendix findings.      Due to the results of the physical exam and CT scan findings [facility] was called to try and get a sooner appointment. Able to get an appointment for tomorrow. Assessment/Plan:    Diagnoses and all orders for this visit:    1. Right lower quadrant abdominal pain    2. Controlled type 2 diabetes mellitus without complication, without long-term current use of insulin ([address]    Has been taking increased metformin dose. Fasting glucoses 140-160. Continue same, defer further treatment to Dr. Decker Gins his PCP    Health Maintenance:   Health Maintenance   Topic Date Due    EYE EXAM RETINAL OR DILATED Q1  01/23/1977    DTaP/Tdap/Td series (1 - Tdap) 05/12/2012    FOBT Q 1 YEAR AGE 50-75  01/23/2017    FOOT EXAM Q1  10/28/2017    Influenza Age 9 to Adult  08/01/2018    LIPID PANEL Q1  11/20/2018    HEMOGLOBIN A1C Q6M  02/01/2019    MICROALBUMIN Q1  08/01/2019    Pneumococcal 19-64 Medium Risk  Completed       I have discussed the diagnosis with the patient and the intended plan as seen in the above orders. The patient has received an After-Visit Summary and questions were answered concerning future plans. Return to clinic if sxs persist, to ER if sxs worsen    Patient verbalized understanding to above instructions. AVS printed and given to pt. Follow-up Disposition:  Return if symptoms worsen or fail to improve. Osmin Garrett, KATHERINE-BC  [address] [facility]   [address] [facility] [address]  [address]

## 2018-08-15 NOTE — PROGRESS NOTES
Chief Complaint   Patient presents with   [facility] Follow Up     1. Have you been to the ER, urgent care clinic since your last visit? Hospitalized since your last visit? Yes When: Jose Palencia on 8/12/2018    2. Have you seen or consulted any other health care providers outside of the [address] [place] [address] since your last visit? Include any pap smears or colon screening.  No    Visit Vitals    /76 (BP 1 Location: Left arm, BP Patient Position: Sitting)    Pulse 90    Temp 98.1 °F (36.7 °C) (Oral)    Resp 12    Ht 5' 8\" (1.727 m)    Wt 308 lb 3.2 oz (139.8 kg)    SpO2 96%    BMI 46.86 kg/m2

## 2018-08-16 PROBLEM — K57.92 DIVERTICULITIS: Status: ACTIVE | Noted: 2017-09-06

## 2018-08-16 PROBLEM — J18.9 PNEUMONIA: Status: RESOLVED | Noted: 2018-08-16 | Resolved: 2018-08-16

## 2018-08-16 PROBLEM — R13.10 SWALLOWING DIFFICULTY: Status: RESOLVED | Noted: 2018-08-16 | Resolved: 2018-08-16

## 2018-08-16 PROBLEM — K21.9 ESOPHAGEAL REFLUX: Status: RESOLVED | Noted: 2018-08-16 | Resolved: 2018-08-16

## 2018-08-16 PROBLEM — R18.8 ABDOMINAL WALL FLUID COLLECTIONS: Status: RESOLVED | Noted: 2017-09-18 | Resolved: 2018-08-16

## 2018-08-16 PROBLEM — A41.9 SEPSIS (HCC): Status: RESOLVED | Noted: 2018-08-16 | Resolved: 2018-08-16

## 2018-08-16 PROBLEM — R18.8 ABDOMINAL WALL FLUID COLLECTIONS: Status: ACTIVE | Noted: 2017-09-18

## 2018-10-05 DIAGNOSIS — M54.12 CERVICAL RADICULOPATHY: ICD-10-CM

## 2018-10-07 RX ORDER — GABAPENTIN 300 MG/1
300 CAPSULE ORAL 3 TIMES DAILY
Qty: 30 CAP | Refills: 0 | Status: SHIPPED | OUTPATIENT
Start: 2018-10-07 | End: 2019-01-09 | Stop reason: SDUPTHER

## 2019-01-09 ENCOUNTER — OFFICE VISIT (OUTPATIENT)
Dept: FAMILY MEDICINE CLINIC | Age: 54
End: 2019-01-09

## 2019-01-09 VITALS
WEIGHT: 308 LBS | DIASTOLIC BLOOD PRESSURE: 92 MMHG | RESPIRATION RATE: 22 BRPM | TEMPERATURE: 97.2 F | OXYGEN SATURATION: 96 % | HEIGHT: 68 IN | HEART RATE: 80 BPM | BODY MASS INDEX: 46.68 KG/M2 | SYSTOLIC BLOOD PRESSURE: 142 MMHG

## 2019-01-09 DIAGNOSIS — Z23 ENCOUNTER FOR IMMUNIZATION: ICD-10-CM

## 2019-01-09 DIAGNOSIS — I10 ESSENTIAL HYPERTENSION: ICD-10-CM

## 2019-01-09 DIAGNOSIS — I10 ESSENTIAL HYPERTENSION: Primary | ICD-10-CM

## 2019-01-09 DIAGNOSIS — M62.838 MUSCLE SPASM: ICD-10-CM

## 2019-01-09 DIAGNOSIS — E78.5 HYPERLIPIDEMIA, UNSPECIFIED HYPERLIPIDEMIA TYPE: ICD-10-CM

## 2019-01-09 DIAGNOSIS — M54.12 CERVICAL RADICULOPATHY: ICD-10-CM

## 2019-01-09 DIAGNOSIS — E11.8 TYPE 2 DIABETES MELLITUS WITH COMPLICATION, WITHOUT LONG-TERM CURRENT USE OF INSULIN (HCC): ICD-10-CM

## 2019-01-09 RX ORDER — LISINOPRIL AND HYDROCHLOROTHIAZIDE 12.5; 2 MG/1; MG/1
2 TABLET ORAL DAILY
Qty: 60 TAB | Refills: 1 | Status: SHIPPED | OUTPATIENT
Start: 2019-01-09 | End: 2019-02-08 | Stop reason: SDUPTHER

## 2019-01-09 RX ORDER — PRAVASTATIN SODIUM 40 MG/1
40 TABLET ORAL
Qty: 30 TAB | Refills: 1 | Status: SHIPPED | OUTPATIENT
Start: 2019-01-09 | End: 2019-02-08 | Stop reason: SDUPTHER

## 2019-01-09 RX ORDER — GABAPENTIN 300 MG/1
300 CAPSULE ORAL 3 TIMES DAILY
Qty: 30 CAP | Refills: 0 | Status: SHIPPED | OUTPATIENT
Start: 2019-01-09 | End: 2019-01-30 | Stop reason: SDUPTHER

## 2019-01-09 RX ORDER — CYCLOBENZAPRINE HCL 10 MG
10 TABLET ORAL
Qty: 30 TAB | Refills: 1 | Status: SHIPPED | OUTPATIENT
Start: 2019-01-09 | End: 2019-02-08 | Stop reason: SDUPTHER

## 2019-01-09 NOTE — PROGRESS NOTES
[facility]    CC: F/U for chronic disease management    HPI:     Hypertension:  -Has not been regularly checking his blood pressure at home  -Taking blood pressure medication prescribed  -Denies any side effects or issues with medication  -Reports his BP has been elevated when he is at his pain management appointments  -Brought home blood pressure monitor to office.   Home BP monitor with notably irregular results in office      Diabetes:  -Taking medication as prescribed  -No known issues or side effects of medication  -Has not been checking blood sugar at home  -Has not been strictly following dietary recommendations      HLD:  -Stopped taking atorvastatin, as he now reports that the 20 mg also causes him to have muscle pain (Previously did not have this issue on 20 mg)  -Has not been strictly following dietary recommendations  -Not following a regular exercise regimen      ROS: Positive items marked in RED  CON: fever, chills  Cardiovascular: palpitations, CP  Resp: SOB, cough  GI: nausea, vomiting, diarrhea  : dysuria, hematuria      Past Medical History:   Diagnosis Date    Abdominal pain     Adverse effect of anesthesia     brother- BP drops after surgery-difficult to wake    Back pain     Chest pain     Diverticulitis     Esophageal reflux     Hypertension     Kidney stones     Microscopic hematuria     Osteoarthritis     Pneumonia     Renal calculus, right     Sepsis ([address]     Snores     Swallowing difficulty     Ureteral calculi     Ureteral stent retained     Urine leukocytes        Past Surgical History:   Procedure Laterality Date    HX CHOLECYSTECTOMY      2002    HX COLONOSCOPY  02.22.2016    HX HIP REPLACEMENT  08/04/2016    HX UROLOGICAL  06/06/2016    [facility]Cyst[facility], CYSTOURETHROSCOPY W/ INDWELLING URETERAL STENT INSERTION, Dr Daniela Gagnon    HX UROLOGICAL  05/29/2016    [facility]- Cystoscopy, Right retrograde pyelography, Right 6 x 26 cm double-J ureteral stent placement,Intraoperative fluoro less than 1 hour,Interpretation of Urography, Dr Uyen De La Fuente          Family History   Problem Relation Age of Onset    Cancer Mother         Lung    Hypertension Mother     Stroke Mother     Cancer Father         Brain       Social History     Socioeconomic History    Marital status:      Spouse name: Not on file    Number of children: Not on file    Years of education: Not on file    Highest education level: Not on file   Tobacco Use    Smoking status: Never Smoker    Smokeless tobacco: Never Used   Substance and Sexual Activity    Alcohol use: No     Alcohol/week: 0.0 oz    Drug use: No    Sexual activity: Yes     Partners: Female       Allergies   Allergen Reactions    Medrol [Methylprednisolone] Other (comments)     tachycardia  Patient states he had a severe allergic reaction about 15 years ago.  Lipitor [Atorvastatin] Other (comments)     Patient reports he was having muscle pain on 40 mg, but not on 20 mg    Hydrocodone-Acetaminophen Rash         Current Outpatient Medications:     gabapentin (NEURONTIN) 300 mg capsule, Take 1 Cap by mouth three (3) times daily. , Disp: 30 Cap, Rfl: 0    cyclobenzaprine (FLEXERIL) 10 mg tablet, Take 1 Tab by mouth three (3) times daily as needed for Muscle Spasm(s). , Disp: 30 Tab, Rfl: 1    lisinopril-hydroCHLOROthiazide (PRINZIDE, ZESTORETIC) 20-12.5 mg per tablet, Take 2 Tabs by mouth daily. , Disp: 60 Tab, Rfl: 1    pravastatin (PRAVACHOL) 40 mg tablet, Take 1 Tab by mouth nightly., Disp: 30 Tab, Rfl: 1    ibuprofen (MOTRIN) 800 mg tablet, Take 1 Tab by mouth every six (6) hours as needed for Pain., Disp: 180 Tab, Rfl: 1    metFORMIN (GLUCOPHAGE) 500 mg tablet, Take 1 Tab by mouth three (3) times daily (with meals). , Disp: 90 Tab, Rfl: 0    fluticasone (FLONASE) 50 mcg/actuation nasal spray, 2 Sprays by Both Nostrils route daily. , Disp: 1 Bottle, Rfl: 3    sildenafil citrate (VIAGRA) 100 mg tablet, take 1 tablet 1 hour prior to intercourse, Disp: 10 Tab, Rfl: 3    LORazepam (ATIVAN) 0.5 mg tablet, Take 1 Tab by mouth every eight (8) hours as needed for Anxiety. Max Daily Amount: 1.5 mg. Indications: ANXIETY, Disp: 30 Tab, Rfl: 1    oxyCODONE-acetaminophen (PERCOCET) 5-325 mg per tablet, Take 1 Tab by mouth every eight (8) hours as needed for Pain. Max Daily Amount: 3 Tabs., Disp: 21 Tab, Rfl: 0    VOLTAREN 1 % gel, Apply  to affected area every six (6) hours. , Disp: 4 g, Rfl: 1    aspirin 81 mg chewable tablet, Take 1 Tab by mouth daily. , Disp: 30 Tab, Rfl: 0    amoxicillin 500 mg tab, Take  by mouth., Disp: , Rfl:     NALOXEGOL OXALATE (MOVANTIK PO), Take  by mouth., Disp: , Rfl:     Physical Exam:      BP (!) 142/92   Pulse 80   Temp 97.2 °F (36.2 °C) (Oral)   Resp 22   Ht 5' 8\" (1.727 m)   Wt 308 lb (139.7 kg)   SpO2 96%   BMI 46.83 kg/m²     General: Obese habitus, NAD, conversant  Eyes: sclera clear bilaterally, no discharge noted, eyelids normal in appearance  HENT: NCAT  Lungs: CTAB, normal respiratory effort and rate  CV: RRR, no MRGs  ABD: soft, non-tender, non-distended, normal bowel sounds  Skin: normal temperature, turgor, color, and texture  Psych: alert and oriented to person, place and situation, normal affect  Neuro: speech normal, moving all extremities      Assessment/Plan     Hypertension, Inadequately Controlled:  -Not at goal blood pressure of less than 130/80:  -Will add on HCTZ to current BP regimen  -Will check CBC, CMP, and fasting lipid panel   -Follow-up in 1 month      DMII:  -Advised to follow lifestyle recommendations   -Will check hemoglobin A1c  -Will continue current medication regimen  -Plan for diabetic foot exam at next visit  -Follow-up in 1 month      HLD:  -Atorvastatin discontinued  -Will start on pravastatin regimen  -Will check fasting lipid panel  -Follow-up in 1 month      Health Maintenance:  -Flu shot today        El Stanley MD  1/9/2019, 12:18 PM

## 2019-01-09 NOTE — PROGRESS NOTES
1. Have you been to the ER, urgent care clinic since your last visit? Hospitalized since your last visit? No    2. Have you seen or consulted any other health care providers outside of the [address] [place] [address] since your last visit? Include any pap smears or colon screening. Yes Pain managment yesterday. Chief Complaint   Patient presents with    Follow Up Chronic Condition     high blood pressure     -No taking Lipitor, amoxicillin, naloxegol.  -Refills need it for: Gabapentin, and flexeril. Visit Vitals  BP (!) 142/92   Pulse 80   Temp 97.2 °F (36.2 °C) (Oral)   Resp 22   Ht 5' 8\" (1.727 m)   Wt 308 lb (139.7 kg)   SpO2 96%   BMI 46.83 kg/m²     98/80 and 155/80 manually from patient's b/p monitor. After obtaining consent, and per orders of Dr. Natalya Mesa, injection of quad given by Austin Villeda LPN. Patient instructed to remain in clinic for 20 minutes afterwards, and to report any adverse reaction to me immediately.

## 2019-01-12 LAB
ALBUMIN SERPL-MCNC: 4.3 G/DL (ref 3.5–5.5)
ALBUMIN/GLOB SERPL: 1.3 {RATIO} (ref 1.2–2.2)
ALP SERPL-CCNC: 55 IU/L (ref 39–117)
ALT SERPL-CCNC: 30 IU/L (ref 0–44)
AST SERPL-CCNC: 23 IU/L (ref 0–40)
BASOPHILS # BLD AUTO: 0.1 X10E3/UL (ref 0–0.2)
BASOPHILS NFR BLD AUTO: 1 %
BILIRUB SERPL-MCNC: <0.2 MG/DL (ref 0–1.2)
BUN SERPL-MCNC: 17 MG/DL (ref 6–24)
BUN/CREAT SERPL: 18 (ref 9–20)
CALCIUM SERPL-MCNC: 9.4 MG/DL (ref 8.7–10.2)
CHLORIDE SERPL-SCNC: 100 MMOL/L (ref 96–106)
CHOLEST SERPL-MCNC: 220 MG/DL (ref 100–199)
CO2 SERPL-SCNC: 24 MMOL/L (ref 20–29)
CREAT SERPL-MCNC: 0.96 MG/DL (ref 0.76–1.27)
EOSINOPHIL # BLD AUTO: 0.3 X10E3/UL (ref 0–0.4)
EOSINOPHIL NFR BLD AUTO: 5 %
ERYTHROCYTE [DISTWIDTH] IN BLOOD BY AUTOMATED COUNT: 14.3 % (ref 12.3–15.4)
EST. AVERAGE GLUCOSE BLD GHB EST-MCNC: 157 MG/DL
GLOBULIN SER CALC-MCNC: 3.3 G/DL (ref 1.5–4.5)
GLUCOSE SERPL-MCNC: 131 MG/DL (ref 65–99)
HBA1C MFR BLD: 7.1 % (ref 4.8–5.6)
HCT VFR BLD AUTO: 44.1 % (ref 37.5–51)
HDLC SERPL-MCNC: 30 MG/DL
HGB BLD-MCNC: 15 G/DL (ref 13–17.7)
IMM GRANULOCYTES # BLD AUTO: 0 X10E3/UL (ref 0–0.1)
IMM GRANULOCYTES NFR BLD AUTO: 0 %
INTERPRETATION, 910389: NORMAL
LDLC SERPL CALC-MCNC: ABNORMAL MG/DL (ref 0–99)
LYMPHOCYTES # BLD AUTO: 1.5 X10E3/UL (ref 0.7–3.1)
LYMPHOCYTES NFR BLD AUTO: 26 %
Lab: NORMAL
MCH RBC QN AUTO: 28.9 PG (ref 26.6–33)
MCHC RBC AUTO-ENTMCNC: 34 G/DL (ref 31.5–35.7)
MCV RBC AUTO: 85 FL (ref 79–97)
MONOCYTES # BLD AUTO: 0.7 X10E3/UL (ref 0.1–0.9)
MONOCYTES NFR BLD AUTO: 11 %
NEUTROPHILS # BLD AUTO: 3.3 X10E3/UL (ref 1.4–7)
NEUTROPHILS NFR BLD AUTO: 57 %
PLATELET # BLD AUTO: 209 X10E3/UL (ref 150–379)
POTASSIUM SERPL-SCNC: 4.3 MMOL/L (ref 3.5–5.2)
PROT SERPL-MCNC: 7.6 G/DL (ref 6–8.5)
RBC # BLD AUTO: 5.19 X10E6/UL (ref 4.14–5.8)
SODIUM SERPL-SCNC: 139 MMOL/L (ref 134–144)
SPECIMEN STATUS REPORT, ROLRST: NORMAL
TRIGL SERPL-MCNC: 405 MG/DL (ref 0–149)
VLDLC SERPL CALC-MCNC: ABNORMAL MG/DL (ref 5–40)
WBC # BLD AUTO: 5.8 X10E3/UL (ref 3.4–10.8)

## 2019-01-30 DIAGNOSIS — M54.12 CERVICAL RADICULOPATHY: ICD-10-CM

## 2019-02-01 RX ORDER — GABAPENTIN 300 MG/1
CAPSULE ORAL
Qty: 30 CAP | Refills: 0 | Status: SHIPPED | OUTPATIENT
Start: 2019-02-01 | End: 2019-02-08 | Stop reason: SDUPTHER

## 2019-02-08 ENCOUNTER — OFFICE VISIT (OUTPATIENT)
Dept: FAMILY MEDICINE CLINIC | Age: 54
End: 2019-02-08

## 2019-02-08 VITALS
BODY MASS INDEX: 45.62 KG/M2 | RESPIRATION RATE: 20 BRPM | WEIGHT: 301 LBS | TEMPERATURE: 96.6 F | DIASTOLIC BLOOD PRESSURE: 77 MMHG | HEIGHT: 68 IN | SYSTOLIC BLOOD PRESSURE: 120 MMHG | OXYGEN SATURATION: 94 % | HEART RATE: 81 BPM

## 2019-02-08 DIAGNOSIS — E78.5 HYPERLIPIDEMIA, UNSPECIFIED HYPERLIPIDEMIA TYPE: ICD-10-CM

## 2019-02-08 DIAGNOSIS — E11.9 TYPE 2 DIABETES MELLITUS WITHOUT COMPLICATION, WITHOUT LONG-TERM CURRENT USE OF INSULIN (HCC): ICD-10-CM

## 2019-02-08 DIAGNOSIS — I10 ESSENTIAL HYPERTENSION: ICD-10-CM

## 2019-02-08 DIAGNOSIS — M62.838 MUSCLE SPASM: ICD-10-CM

## 2019-02-08 DIAGNOSIS — M54.12 CERVICAL RADICULOPATHY: ICD-10-CM

## 2019-02-08 RX ORDER — METFORMIN HYDROCHLORIDE 1000 MG/1
1000 TABLET ORAL 2 TIMES DAILY WITH MEALS
Qty: 180 TAB | Refills: 1 | Status: SHIPPED | OUTPATIENT
Start: 2019-02-08 | End: 2019-07-29 | Stop reason: SDUPTHER

## 2019-02-08 RX ORDER — PRAVASTATIN SODIUM 40 MG/1
40 TABLET ORAL
Qty: 90 TAB | Refills: 1 | Status: SHIPPED | OUTPATIENT
Start: 2019-02-08 | End: 2019-08-02 | Stop reason: SDUPTHER

## 2019-02-08 RX ORDER — METFORMIN HYDROCHLORIDE 500 MG/1
500 TABLET ORAL
Qty: 90 TAB | Refills: 0 | Status: CANCELLED | OUTPATIENT
Start: 2019-02-08

## 2019-02-08 RX ORDER — LISINOPRIL AND HYDROCHLOROTHIAZIDE 12.5; 2 MG/1; MG/1
2 TABLET ORAL DAILY
Qty: 180 TAB | Refills: 1 | Status: SHIPPED | OUTPATIENT
Start: 2019-02-08 | End: 2019-09-17 | Stop reason: ALTCHOICE

## 2019-02-08 RX ORDER — GABAPENTIN 300 MG/1
CAPSULE ORAL
Qty: 270 CAP | Refills: 1 | Status: SHIPPED | OUTPATIENT
Start: 2019-02-08 | End: 2019-06-13 | Stop reason: DRUGHIGH

## 2019-02-08 RX ORDER — CYCLOBENZAPRINE HCL 10 MG
10 TABLET ORAL
Qty: 30 TAB | Refills: 1 | Status: SHIPPED | OUTPATIENT
Start: 2019-02-08 | End: 2019-03-20 | Stop reason: SDUPTHER

## 2019-02-08 NOTE — PROGRESS NOTES
[facility]    CC: F/U for chronic disease management    HPI:     Hypertension:  -Has not been regularly checking his blood pressure at home  -Taking blood pressure medication as prescribed  -Denies any side effects or issues with medication  -Has not been strictly following dietary recommendations  -Not following a regular exercise regimen        Diabetes:  -Taking diabetic medication as prescribed  -No known issues or side effects from medication  -Has been checking blood sugar at home infrequently.  -Log brought in for review.  Four FBS not at goal and one at goal.FBS range of 122-219.  -Has not been strictly following dietary recommendations  -Not following a regular exercise regimen       HLD:  -Taking pravastatin as prescribed  -Denies any side effects or issues with the medication  -Has not been strictly following dietary recommendations  -Not following a regular exercise regimen      ROS: Positive items marked in RED  CON: fever, chills  Cardiovascular: palpitations, CP  Resp: SOB, cough  GI: nausea, vomiting, diarrhea  : dysuria, hematuria      Past Medical History:   Diagnosis Date    Abdominal pain     Adverse effect of anesthesia     brother- BP drops after surgery-difficult to wake    Back pain     Chest pain     Diverticulitis     Esophageal reflux     Hypertension     Kidney stones     Microscopic hematuria     Osteoarthritis     Pneumonia     Renal calculus, right     Sepsis ([address]     Snores     Swallowing difficulty     Ureteral calculi     Ureteral stent retained     Urine leukocytes        Past Surgical History:   Procedure Laterality Date    HX CHOLECYSTECTOMY      2002    HX COLONOSCOPY  02.22.2016    HX HIP REPLACEMENT  08/04/2016    HX UROLOGICAL  06/06/2016    [facility], CYSTOURETHROSCOPY W/ INDWELLING URETERAL STENT INSERTION, Dr Dariel Irvin    HX UROLOGICAL  05/29/2016    [facility]- Cystoscopy, Right retrograde pyelography, Right 6 x 26 cm double-J ureteral stent placement,Intraoperative fluoro less than 1 hour,Interpretation of Urography, Dr Junior Sales          Family History   Problem Relation Age of Onset    Cancer Mother         Lung    Hypertension Mother     Stroke Mother     Cancer Father         Brain       Social History     Socioeconomic History    Marital status:      Spouse name: Not on file    Number of children: Not on file    Years of education: Not on file    Highest education level: Not on file   Tobacco Use    Smoking status: Never Smoker    Smokeless tobacco: Never Used   Substance and Sexual Activity    Alcohol use: No     Alcohol/week: 0.0 oz    Drug use: No    Sexual activity: Yes     Partners: Female       Allergies   Allergen Reactions    Medrol [Methylprednisolone] Other (comments)     tachycardia  Patient states he had a severe allergic reaction about 15 years ago.  Lipitor [Atorvastatin] Other (comments)    Hydrocodone-Acetaminophen Rash         Current Outpatient Medications:     gabapentin (NEURONTIN) 300 mg capsule, TAKE ONE CAPSULE BY MOUTH THREE TIMES DAILY , Disp: 30 Cap, Rfl: 0    cyclobenzaprine (FLEXERIL) 10 mg tablet, Take 1 Tab by mouth three (3) times daily as needed for Muscle Spasm(s). , Disp: 30 Tab, Rfl: 1    lisinopril-hydroCHLOROthiazide (PRINZIDE, ZESTORETIC) 20-12.5 mg per tablet, Take 2 Tabs by mouth daily. , Disp: 60 Tab, Rfl: 1    pravastatin (PRAVACHOL) 40 mg tablet, Take 1 Tab by mouth nightly., Disp: 30 Tab, Rfl: 1    ibuprofen (MOTRIN) 800 mg tablet, Take 1 Tab by mouth every six (6) hours as needed for Pain., Disp: 180 Tab, Rfl: 1    metFORMIN (GLUCOPHAGE) 500 mg tablet, Take 1 Tab by mouth three (3) times daily (with meals). , Disp: 90 Tab, Rfl: 0    amoxicillin 500 mg tab, Take  by mouth., Disp: , Rfl:     NALOXEGOL OXALATE (MOVANTIK PO), Take  by mouth., Disp: , Rfl:     fluticasone (FLONASE) 50 mcg/actuation nasal spray, 2 Sprays by Both Nostrils route daily. , Disp: 1 Bottle, Rfl: 3    sildenafil citrate (VIAGRA) 100 mg tablet, take 1 tablet 1 hour prior to intercourse, Disp: 10 Tab, Rfl: 3    LORazepam (ATIVAN) 0.5 mg tablet, Take 1 Tab by mouth every eight (8) hours as needed for Anxiety. Max Daily Amount: 1.5 mg. Indications: ANXIETY, Disp: 30 Tab, Rfl: 1    oxyCODONE-acetaminophen (PERCOCET) 5-325 mg per tablet, Take 1 Tab by mouth every eight (8) hours as needed for Pain. Max Daily Amount: 3 Tabs., Disp: 21 Tab, Rfl: 0    VOLTAREN 1 % gel, Apply  to affected area every six (6) hours. , Disp: 4 g, Rfl: 1    aspirin 81 mg chewable tablet, Take 1 Tab by mouth daily. , Disp: 30 Tab, Rfl: 0    Physical Exam:      /77   Pulse 81   Temp 96.6 °F (35.9 °C) (Oral)   Resp 20   Ht 5' 8\" (1.727 m)   Wt 301 lb (136.5 kg)   SpO2 94%   BMI 45.77 kg/m²     General: Obese habitus, NAD, conversant  Eyes: sclera clear bilaterally, no discharge noted, eyelids normal in appearance  HENT: NCAT  Lungs: CTAB, normal respiratory effort and rate  CV: RRR, no MRGs  ABD: soft, non-tender, non-distended, normal bowel sounds  Skin: normal temperature, turgor, color, and texture  Psych: alert and oriented to person, place and situation, normal affect  Neuro: speech normal, moving all extremities      Results for Riley Jurist (MRN [id number]):   Ref.  Range 1/11/2019 08:45   WBC Latest Ref Range: 3.4 - 10.8 x10E3/uL 5.8   RBC Latest Ref Range: 4.14 - 5.80 x10E6/uL 5.19   HGB Latest Ref Range: 13.0 - 17.7 g/dL 15.0   HCT Latest Ref Range: 37.5 - 51.0 % 44.1   MCV Latest Ref Range: 79 - 97 fL 85   MCH Latest Ref Range: 26.6 - 33.0 pg 28.9   MCHC Latest Ref Range: 31.5 - 35.7 g/dL 34.0   RDW Latest Ref Range: 12.3 - 15.4 % 14.3   PLATELET Latest Ref Range: 150 - 379 x10E3/uL 209   NEUTROPHILS Latest Ref Range: Not Estab. % 57   Lymphocytes Latest Ref Range: Not Estab. % 26   MONOCYTES Latest Ref Range: Not Estab. % 11   EOSINOPHILS Latest Ref Range: Not Estab. % 5   BASOPHILS Latest Ref Range: Not Estab. % 1   IMMATURE GRANULOCYTES Latest Ref Range: Not Estab. % 0   ABS. NEUTROPHILS Latest Ref Range: 1.4 - 7.0 x10E3/uL 3.3   ABS. IMM. GRANS. Latest Ref Range: 0.0 - 0.1 x10E3/uL 0.0   Abs Lymphocytes Latest Ref Range: 0.7 - 3.1 x10E3/uL 1.5   ABS. MONOCYTES Latest Ref Range: 0.1 - 0.9 x10E3/uL 0.7   ABS. EOSINOPHILS Latest Ref Range: 0.0 - 0.4 x10E3/uL 0.3   ABS. BASOPHILS Latest Ref Range: 0.0 - 0.2 x10E3/uL 0.1   Sodium Latest Ref Range: 134 - 144 mmol/L 139   Potassium Latest Ref Range: 3.5 - 5.2 mmol/L 4.3   Chloride Latest Ref Range: 96 - 106 mmol/L 100   CO2 Latest Ref Range: 20 - 29 mmol/L 24   Glucose Latest Ref Range: 65 - 99 mg/dL 131 (H)   BUN Latest Ref Range: 6 - 24 mg/dL 17   Creatinine Latest Ref Range: 0.76 - 1.27 mg/dL 0.96   BUN/Creatinine ratio Latest Ref Range: 9 - 20  18   Calcium Latest Ref Range: 8.7 - 10.2 mg/dL 9.4   GFR est non-AA Latest Ref Range: >59 mL/min/1.73 91   GFR est AA Latest Ref Range: >59 mL/min/1.73 105   Bilirubin, total Latest Ref Range: 0.0 - 1.2 mg/dL <0.2   Protein, total Latest Ref Range: 6.0 - 8.5 g/dL 7.6   Albumin Latest Ref Range: 3.5 - 5.5 g/dL 4.3   A-G Ratio Latest Ref Range: 1.2 - 2.2  1.3   ALT (SGPT) Latest Ref Range: 0 - 44 IU/L 30   AST Latest Ref Range: 0 - 40 IU/L 23   Alk.  phosphatase Latest Ref Range: 39 - 117 IU/L 55   Triglyceride Latest Ref Range: 0 - 149 mg/dL 405 (H)   Cholesterol, total Latest Ref Range: 100 - 199 mg/dL 220 (H)   HDL Cholesterol Latest Ref Range: >39 mg/dL 30 (L)   LDL, calculated Latest Ref Range: 0 - 99 mg/dL Comment   VLDL, calculated Latest Ref Range: 5 - 40 mg/dL Comment   Hemoglobin A1c, (calculated) Latest Ref Range: 4.8 - 5.6 % 7.1 (H)   Estimated average glucose Latest Units: mg/dL 157       Assessment/Plan     Hypertension, Well Controlled:  -At goal blood pressure of <130/80  -Will continue current BP medication regimen  -Follow-up in 3 months        DMII:  -Near goal hemoglobin A1c of less than 7%   -Will increase metformin to 1000 mg twice daily   -Plan for diabetic foot exam at next visit  -Follow-up in 3 months        HLD:  -Tolerating statin  -Will continue moderate intensity statin therapy  -Follow-up in 3 months        Yissel Oliver MD  2/8/2019, 9:27 AM

## 2019-02-08 NOTE — PROGRESS NOTES
1. Have you been to the ER, urgent care clinic since your last visit? Hospitalized since your last visit? Yes 01-19-19 ER chest pain Jane Hernandez    2. Have you seen or consulted any other health care providers outside of the [address] [place] [address] since your last visit? Include any pap smears or colon screening.  Yes Pain managment     Chief Complaint   Patient presents with    Follow Up Chronic Condition     Visit Vitals  /77   Pulse 81   Temp 96.6 °F (35.9 °C) (Oral)   Resp 20   Ht 5' 8\" (1.727 m)   Wt 301 lb (136.5 kg)   SpO2 94%   BMI 45.77 kg/m²

## 2019-03-01 ENCOUNTER — OFFICE VISIT (OUTPATIENT)
Dept: FAMILY MEDICINE CLINIC | Age: 54
End: 2019-03-01

## 2019-03-01 VITALS
RESPIRATION RATE: 16 BRPM | HEIGHT: 68 IN | SYSTOLIC BLOOD PRESSURE: 103 MMHG | WEIGHT: 297 LBS | TEMPERATURE: 98.5 F | HEART RATE: 93 BPM | OXYGEN SATURATION: 95 % | BODY MASS INDEX: 45.01 KG/M2 | DIASTOLIC BLOOD PRESSURE: 69 MMHG

## 2019-03-01 DIAGNOSIS — B37.0 THRUSH: Primary | ICD-10-CM

## 2019-03-01 RX ORDER — NYSTATIN 100000 [USP'U]/ML
400000 SUSPENSION ORAL 4 TIMES DAILY
Qty: 473 ML | Refills: 0 | Status: SHIPPED | OUTPATIENT
Start: 2019-03-01 | End: 2019-03-01 | Stop reason: RX

## 2019-03-01 RX ORDER — RANITIDINE 150 MG/1
150 TABLET, FILM COATED ORAL 2 TIMES DAILY
Qty: 180 TAB | Refills: 1 | Status: SHIPPED | OUTPATIENT
Start: 2019-03-01 | End: 2019-05-20

## 2019-03-01 NOTE — PROGRESS NOTES
[facility]    CC: White Spots    HPI:     White Spots:  - Location: Center of tongue  - Context: Reports Hx of oral thrush. Feels like current symptoms.  - Progression/Course: White spots appear to have resolved.  Checked tongue today and spots were gone, but he noted yellowish film on sides of tongue.  - Associated Symptoms/signs: Increased tongue sensitivity      ROS: Positive items marked in RED  CON: fever, chills  Cardiovascular: palpitations, CP  Resp: SOB, cough  GI: nausea, vomiting, diarrhea  : dysuria, hematuria      Past Medical History:   Diagnosis Date    Abdominal pain     Adverse effect of anesthesia     brother- BP drops after surgery-difficult to wake    Back pain     Chest pain     Diverticulitis     Esophageal reflux     Hypertension     Kidney stones     Microscopic hematuria     Osteoarthritis     Pneumonia     Renal calculus, right     Sepsis ([address]     Snores     Swallowing difficulty     Ureteral calculi     Ureteral stent retained     Urine leukocytes        Past Surgical History:   Procedure Laterality Date    HX CHOLECYSTECTOMY      2002    HX COLONOSCOPY  02.22.2016    HX HIP REPLACEMENT  08/04/2016    HX UROLOGICAL  06/06/2016    [facility], CYSTOURETHROSCOPY W/ INDWELLING URETERAL STENT INSERTION, Dr Rick Parks HX UROLOGICAL  05/29/2016    [facility]- Cystoscopy, Right retrograde pyelography, Right 6 x 26 cm double-J ureteral stent placement,Intraoperative fluoro less than 1 hour,Interpretation of Urography, Dr Saurabh Sy          Family History   Problem Relation Age of Onset    Cancer Mother         Lung    Hypertension Mother     Stroke Mother     Cancer Father         Brain       Social History     Socioeconomic History    Marital status:      Spouse name: Not on file    Number of children: Not on file    Years of education: Not on file    Highest education level: Not on file   Tobacco Use    Smoking status: Never Smoker    Smokeless tobacco: Never Used   Substance and Sexual Activity    Alcohol use: No     Alcohol/week: 0.0 oz    Drug use: No    Sexual activity: Yes     Partners: Female       Allergies   Allergen Reactions    Medrol [Methylprednisolone] Other (comments)     tachycardia  Patient states he had a severe allergic reaction about 15 years ago.  Lipitor [Atorvastatin] Other (comments)    Hydrocodone-Acetaminophen Rash         Current Outpatient Medications:     lisinopril-hydroCHLOROthiazide (PRINZIDE, ZESTORETIC) 20-12.5 mg per tablet, Take 2 Tabs by mouth daily. , Disp: 180 Tab, Rfl: 1    gabapentin (NEURONTIN) 300 mg capsule, TAKE ONE CAPSULE BY MOUTH THREE TIMES DAILY, Disp: 270 Cap, Rfl: 1    cyclobenzaprine (FLEXERIL) 10 mg tablet, Take 1 Tab by mouth three (3) times daily as needed for Muscle Spasm(s). , Disp: 30 Tab, Rfl: 1    pravastatin (PRAVACHOL) 40 mg tablet, Take 1 Tab by mouth nightly., Disp: 90 Tab, Rfl: 1    metFORMIN (GLUCOPHAGE) 1,000 mg tablet, Take 1 Tab by mouth two (2) times daily (with meals). , Disp: 180 Tab, Rfl: 1    ibuprofen (MOTRIN) 800 mg tablet, Take 1 Tab by mouth every six (6) hours as needed for Pain., Disp: 180 Tab, Rfl: 1    NALOXEGOL OXALATE (MOVANTIK PO), Take  by mouth., Disp: , Rfl:     fluticasone (FLONASE) 50 mcg/actuation nasal spray, 2 Sprays by Both Nostrils route daily. , Disp: 1 Bottle, Rfl: 3    sildenafil citrate (VIAGRA) 100 mg tablet, take 1 tablet 1 hour prior to intercourse, Disp: 10 Tab, Rfl: 3    LORazepam (ATIVAN) 0.5 mg tablet, Take 1 Tab by mouth every eight (8) hours as needed for Anxiety. Max Daily Amount: 1.5 mg. Indications: ANXIETY, Disp: 30 Tab, Rfl: 1    oxyCODONE-acetaminophen (PERCOCET) 5-325 mg per tablet, Take 1 Tab by mouth every eight (8) hours as needed for Pain. Max Daily Amount: 3 Tabs., Disp: 21 Tab, Rfl: 0    VOLTAREN 1 % gel, Apply  to affected area every six (6) hours. , Disp: 4 g, Rfl: 1    aspirin 81 mg chewable tablet, Take 1 Tab by mouth daily. , Disp: 30 Tab, Rfl: 0    Physical Exam:      /69   Pulse 93   Temp 98.5 °F (36.9 °C) (Oral)   Resp 16   Ht 5' 8\" (1.727 m)   Wt 297 lb (134.7 kg)   SpO2 95%   BMI 45.16 kg/m²     General: obese habitus, NAD, conversant  Eyes: sclera clear bilaterally, no discharge noted, eyelids normal in appearance  HENT: NCAT, yellow film noted on side of tongue bilaterally, MMM  Lungs: normal respiratory effort and rate  Skin: normal temperature, turgor, color, and texture  Psych: alert and oriented to person, place and situation, normal affect  Neuro: speech normal, moving all extremities, gait normal      Assessment/Plan     Thrush:  -Suspected diagnosis given history and notable film on the tongue  -Nystatin regimen ordered  -Handout given on candidiasis care  -Follow-up as needed if symptoms worsen or fail to improve        Moreno Jeronimo MD  3/1/2019, 2:52 PM

## 2019-03-01 NOTE — PATIENT INSTRUCTIONS
Candidiasis: Care Instructions  Your Care Instructions  Candidiasis (say \"uaz-ffa-JC-uh-steffen\") is a yeast infection. Yeast normally lives in your body. But it can cause problems if your body's defenses don't work as they should. Some medicines can increase your chance of getting a yeast infection. These include antibiotics, steroids, and cancer drugs. And some diseases like AIDS and diabetes can make you more likely to get yeast infections. There are different types of yeast infections. Cj Forrester is a yeast infection in the mouth. It usually occurs in people with weak immune systems. It causes white patches inside the mouth and throat. Yeast infections of the skin usually occur in skin folds where the skin stays moist. They cause red, oozing patches on your skin. Babies can get these infections under the diaper. People who often wear gloves can get them on their hands. Many women get vaginal yeast infections. They are most common when women take antibiotics. These infections can cause the vagina to itch and burn. They also cause white discharge that looks like cottage cheese. In rare cases, yeast infects the blood. This can cause serious disease. This kind of infection is treated with medicine given through a needle into a vein (IV). After you start treatment, a yeast infection usually goes away quickly. But if your immune system is weak, the infection may come back. Tell your doctor if you get yeast infections often. Follow-up care is a key part of your treatment and safety. Be sure to make and go to all appointments, and call your doctor if you are having problems. It's also a good idea to know your test results and keep a list of the medicines you take. How can you care for yourself at home? · Take your medicines exactly as prescribed. Call your doctor if you think you are having a problem with your medicine. · Use antibiotics only as directed by your doctor. · Eat yogurt with live cultures.  It has bacteria called lactobacillus. It may help prevent some types of yeast infections. · Keep your skin clean and dry. Put powder on moist places. · If you are using a cream or suppository to treat a vaginal yeast infection, don't use condoms or a diaphragm. Use a different type of birth control. · Eat a healthy diet and get regular exercise. This will help keep your immune system strong. When should you call for help? Watch closely for changes in your health, and be sure to contact your doctor if:    · You do not get better as expected. Where can you learn more? Go to http://andreia-liliane.info/. Enter H287 in the search box to learn more about \"Candidiasis: Care Instructions. \"  Current as of: May 14, 2018  Content Version: 11.9  © 6652-8684 [employer]. Care instructions adapted under license by [employer] (which disclaims liability or warranty for this information). If you have questions about a medical condition or this instruction, always ask your healthcare professional. [address] any warranty or liability for your use of this information.

## 2019-03-01 NOTE — PROGRESS NOTES
Chief Complaint   Patient presents with    Thrush     Possible thrush. Tongue is sensitive.  Other     White bumps on tongue     1. Have you been to the ER, urgent care clinic since your last visit? Hospitalized since your last visit? No.    2. Have you seen or consulted any other health care providers outside of the [address] [place] [address] since your last visit?  No.    Visit Vitals  /69   Pulse 93   Temp 98.5 °F (36.9 °C) (Oral)   Resp 16   Ht 5' 8\" (1.727 m)   Wt 297 lb (134.7 kg)   SpO2 95%   BMI 45.16 kg/m²

## 2019-03-20 DIAGNOSIS — M62.838 MUSCLE SPASM: ICD-10-CM

## 2019-03-20 NOTE — TELEPHONE ENCOUNTER
· Last office visit was on 3/1/19  · Next appointment scheduled for 5/8/19    Flexeril  · Last filled on 2/8/19  · Qty: 30  · Refills: 1    Flonase  · Last filled on 11/9/17  · Qty: 1 bottle  · Refills: 3

## 2019-03-21 RX ORDER — FLUTICASONE PROPIONATE 50 MCG
2 SPRAY, SUSPENSION (ML) NASAL DAILY
Qty: 1 BOTTLE | Refills: 3 | Status: SHIPPED | OUTPATIENT
Start: 2019-03-21

## 2019-03-21 RX ORDER — CYCLOBENZAPRINE HCL 10 MG
10 TABLET ORAL
Qty: 30 TAB | Refills: 1 | Status: SHIPPED | OUTPATIENT
Start: 2019-03-21 | End: 2019-05-02 | Stop reason: SDUPTHER

## 2019-05-02 ENCOUNTER — OFFICE VISIT (OUTPATIENT)
Dept: FAMILY MEDICINE CLINIC | Age: 54
End: 2019-05-02

## 2019-05-02 VITALS
OXYGEN SATURATION: 98 % | HEIGHT: 68 IN | HEART RATE: 105 BPM | TEMPERATURE: 97.2 F | RESPIRATION RATE: 18 BRPM | DIASTOLIC BLOOD PRESSURE: 68 MMHG | SYSTOLIC BLOOD PRESSURE: 106 MMHG | BODY MASS INDEX: 45.31 KG/M2 | WEIGHT: 299 LBS

## 2019-05-02 DIAGNOSIS — M62.838 MUSCLE SPASM: ICD-10-CM

## 2019-05-02 DIAGNOSIS — B95.0 STREPTOCOCCAL INFECTION GROUP A: ICD-10-CM

## 2019-05-02 DIAGNOSIS — F43.21 INSOMNIA SECONDARY TO SITUATIONAL DEPRESSION: ICD-10-CM

## 2019-05-02 DIAGNOSIS — K12.2 UVULITIS: Primary | ICD-10-CM

## 2019-05-02 DIAGNOSIS — F51.05 INSOMNIA SECONDARY TO SITUATIONAL DEPRESSION: ICD-10-CM

## 2019-05-02 DIAGNOSIS — B37.0 THRUSH: ICD-10-CM

## 2019-05-02 RX ORDER — NYSTATIN 100000 [USP'U]/ML
4 SUSPENSION ORAL 4 TIMES DAILY
Qty: 224 ML | Refills: 0 | Status: SHIPPED | OUTPATIENT
Start: 2019-05-02 | End: 2019-05-16

## 2019-05-02 RX ORDER — CYCLOBENZAPRINE HCL 10 MG
10 TABLET ORAL
Qty: 30 TAB | Refills: 1 | Status: SHIPPED | OUTPATIENT
Start: 2019-05-02 | End: 2019-06-10 | Stop reason: SDUPTHER

## 2019-05-02 RX ORDER — AMOXICILLIN 500 MG/1
500 CAPSULE ORAL 2 TIMES DAILY
Qty: 20 CAP | Refills: 0 | Status: SHIPPED | OUTPATIENT
Start: 2019-05-02 | End: 2019-05-12

## 2019-05-02 RX ORDER — TRAZODONE HYDROCHLORIDE 50 MG/1
50 TABLET ORAL
Qty: 30 TAB | Refills: 3 | Status: SHIPPED | OUTPATIENT
Start: 2019-05-02 | End: 2019-05-14 | Stop reason: SDUPTHER

## 2019-05-02 NOTE — PROGRESS NOTES
Chief Complaint   Patient presents with    Sore Throat     Patient stated that it feels like something is in the back of throat     1. Have you been to the ER, urgent care clinic since your last visit? Hospitalized since your last visit? No.    2. Have you seen or consulted any other health care providers outside of the [address] [place] [address] since your last visit? Saw pain management doctor.     Visit Vitals  /68   Pulse (!) 105   Temp 97.2 °F (36.2 °C) (Oral)   Resp 18   Ht 5' 8\" (1.727 m)   Wt 299 lb (135.6 kg)   SpO2 98%   BMI 45.46 kg/m²

## 2019-05-02 NOTE — PROGRESS NOTES
[facility]    CC: Sore Throat    HPI:     Sore Throat:  - Timing/onset: Started ~4 days ago  - Duration: Constant  - Location: Back top of throat  - Context: Has been having bad congest/allergies this spring and never treated prior thrush  - Progression/Course: unchanged  - Associated Symptoms/signs: Feels like something is at back of throat   - Alleviating factors: Chloraseptic spray  - Aggravating factors: Swallowing      Depression:  -2/2 being told he was not a surgical candidate for his back (2 weeks ago)  -Associated with Insomnia  -Sleeping only 2-3 hours      Back Muscle Spasms:  -Generalized  -Flexeril works well  -Denies any side effects with the medication      ROS: Positive items marked in RED  CON: fever, chills  Cardiovascular: palpitations, CP  Resp: SOB, cough  GI: nausea, vomiting, diarrhea  : dysuria, hematuria      Past Medical History:   Diagnosis Date    Abdominal pain     Adverse effect of anesthesia     brother- BP drops after surgery-difficult to wake    Back pain     Chest pain     Diverticulitis     Esophageal reflux     Hypertension     Kidney stones     Microscopic hematuria     Osteoarthritis     Pneumonia     Renal calculus, right     Sepsis ([address]     Snores     Swallowing difficulty     Ureteral calculi     Ureteral stent retained     Urine leukocytes        Past Surgical History:   Procedure Laterality Date    HX CHOLECYSTECTOMY      2002    HX COLONOSCOPY  02.22.2016    HX HIP REPLACEMENT  08/04/2016    HX UROLOGICAL  06/06/2016    SL-Cystoscopy, CYSTOURETHROSCOPY W/ INDWELLING URETERAL STENT INSERTION, Dr Spencer Hines  UROLOGICAL  05/29/2016    SL- Cystoscopy, Right retrograde pyelography, Right 6 x 26 cm double-J ureteral stent placement,Intraoperative fluoro less than 1 hour,Interpretation of Urography, Dr Caren Moya          Family History   Problem Relation Age of Onset    Cancer Mother         Lung    Hypertension Mother     Stroke Mother     Cancer Father         Brain       Social History     Socioeconomic History    Marital status:      Spouse name: Not on file    Number of children: Not on file    Years of education: Not on file    Highest education level: Not on file   Tobacco Use    Smoking status: Never Smoker    Smokeless tobacco: Never Used   Substance and Sexual Activity    Alcohol use: No     Alcohol/week: 0.0 oz    Drug use: No    Sexual activity: Yes     Partners: Female       Allergies   Allergen Reactions    Medrol [Methylprednisolone] Other (comments)     tachycardia  Patient states he had a severe allergic reaction about 15 years ago.  Lipitor [Atorvastatin] Other (comments)    Hydrocodone-Acetaminophen Rash         Current Outpatient Medications:     fluticasone propionate (FLONASE) 50 mcg/actuation nasal spray, 2 Sprays by Both Nostrils route daily. , Disp: 1 Bottle, Rfl: 3    cyclobenzaprine (FLEXERIL) 10 mg tablet, Take 1 Tab by mouth three (3) times daily as needed for Muscle Spasm(s). Indications: muscle spasm, Disp: 30 Tab, Rfl: 1    raNITIdine (ZANTAC) 150 mg tablet, Take 1 Tab by mouth two (2) times a day., Disp: 180 Tab, Rfl: 1    Miconazole 50 mg mabt, 50 mg (1 tablet) applied to the upper gum region once daily for 14 days, Disp: 14 Each, Rfl: 0    lisinopril-hydroCHLOROthiazide (PRINZIDE, ZESTORETIC) 20-12.5 mg per tablet, Take 2 Tabs by mouth daily. , Disp: 180 Tab, Rfl: 1    gabapentin (NEURONTIN) 300 mg capsule, TAKE ONE CAPSULE BY MOUTH THREE TIMES DAILY, Disp: 270 Cap, Rfl: 1    pravastatin (PRAVACHOL) 40 mg tablet, Take 1 Tab by mouth nightly., Disp: 90 Tab, Rfl: 1    metFORMIN (GLUCOPHAGE) 1,000 mg tablet, Take 1 Tab by mouth two (2) times daily (with meals). , Disp: 180 Tab, Rfl: 1    ibuprofen (MOTRIN) 800 mg tablet, Take 1 Tab by mouth every six (6) hours as needed for Pain., Disp: 180 Tab, Rfl: 1    NALOXEGOL OXALATE (MOVANTIK PO), Take  by mouth., Disp: , Rfl:    sildenafil citrate (VIAGRA) 100 mg tablet, take 1 tablet 1 hour prior to intercourse, Disp: 10 Tab, Rfl: 3    LORazepam (ATIVAN) 0.5 mg tablet, Take 1 Tab by mouth every eight (8) hours as needed for Anxiety. Max Daily Amount: 1.5 mg. Indications: ANXIETY, Disp: 30 Tab, Rfl: 1    oxyCODONE-acetaminophen (PERCOCET) 5-325 mg per tablet, Take 1 Tab by mouth every eight (8) hours as needed for Pain. Max Daily Amount: 3 Tabs., Disp: 21 Tab, Rfl: 0    VOLTAREN 1 % gel, Apply  to affected area every six (6) hours. , Disp: 4 g, Rfl: 1    aspirin 81 mg chewable tablet, Take 1 Tab by mouth daily. , Disp: 30 Tab, Rfl: 0    Physical Exam:      /68   Pulse (!) 105   Temp 97.2 °F (36.2 °C) (Oral)   Resp 18   Ht 5' 8\" (1.727 m)   Wt 299 lb (135.6 kg)   SpO2 98%   BMI 45.46 kg/m²     General:  Obese habitus, NAD, conversant  Eyes: sclera clear bilaterally, no discharge noted, eyelids normal in appearance  HENT: NCAT, top of oropharynx is erythematous including the uvula, uvula in significantly enlarged and touching tongue, tongue covered with yellowish film, MMM  Neck: supple, no lymphadenopathy  Lungs: CTAB, normal respiratory effort and rate  CV: RRR, no MRGs  ABD: soft, non-tender, non-distended, normal bowel sounds  Skin: normal temperature, turgor, color, and texture  Psych: alert and oriented to person, place and situation, normal affect  Neuro: speech normal, moving all extremities, gait normal      AMB POC RAPID STREP A (5/2/2019):  Positive    Assessment/Plan     Uvulitis:  -Maybe 2/2 candida or strep infection  -Will start on nystatin and amoxicillin regimen  -Handout given on uvulitis care  -F/U in one month      Depression with Insomnia:  -Will start on trazodone regimen  -Handouts given on depression care, sleeping well, and insomnia care  -F/U in one month      Back Muscle Spasms:  -Will continue current Flexeril regimen  -F/U in one month        Harriet Holm MD  5/2/2019, 1:05 PM

## 2019-05-02 NOTE — PATIENT INSTRUCTIONS
Uvulitis: Care Instructions  Your Care Instructions    Uvulitis (say \"env-zyob-IZ-tus\") is an inflammation of the uvula (say \"UOY-itdp-lpq\"). This is the small piece of finger-shaped tissue that hangs down in the back of the throat. Uvulitis is most often caused by an infection. It can also be a reaction to an allergy or injury. Often the cause is not known. Your uvula may be red and swollen. You may feel like something is stuck at the back of your throat. Sometimes you may have a hard time swallowing. You may have a sore throat or a fever. Home treatment for a sore throat may be all that is needed to relieve uvulitis. If it is caused by an infection, your doctor may give you an antibiotic. Your doctor may prescribe an antihistamine or a steroid medicine if uvulitis is caused by an allergy. It may also go away without treatment. Follow-up care is a key part of your treatment and safety. Be sure to make and go to all appointments, and call your doctor if you are having problems. It's also a good idea to know your test results and keep a list of the medicines you take. How can you care for yourself at home? · Be safe with medicines. Take your medicines exactly as prescribed. Call your doctor if you think you are having a problem with your medicine. You will get more details on the specific medicines your doctor prescribes. · Gargle with warm salt water once an hour to help reduce swelling and relieve pain. Use 1 teaspoon of salt mixed in 1 cup of warm water. · Try an over-the-counter throat spray to relieve throat pain. · Ask your doctor if you can take an over-the-counter pain medicine, such as acetaminophen (Tylenol), ibuprofen (Advil, Motrin), or naproxen (Aleve). Read and follow all instructions on the label. · Drink plenty of fluids. Fluids may help soothe your throat.  If you have kidney, heart, or liver disease and have to limit fluids, talk with your doctor before you increase the amount of fluids you drink. · Do not smoke or allow others to smoke around you. Smoking can make your throat problem worse. If you need help quitting, talk to your doctor about stop-smoking programs and medicines. These can increase your chances of quitting for good. When should you call for help? Call your doctor now or seek immediate medical care if:    · You have new or worse symptoms of infection, such as:  ? Increased pain, swelling, warmth, or redness. ? Red streaks leading from the area. ? Pus draining from the area. ? A fever.     · You have new pain, or your pain gets worse.     · You have new or worse trouble swallowing.     · You seem to be getting sicker.     · You have shortness of breath.    Watch closely for changes in your health, and be sure to contact your doctor if:    · You do not get better as expected. Where can you learn more? Go to http://andreia-liliane.info/. Enter L348 in the search box to learn more about \"Uvulitis: Care Instructions. \"  Current as of: March 27, 2018  Content Version: 11.9  © 5405-5927 [employer]. Care instructions adapted under license by [employer] (which disclaims liability or warranty for this information). If you have questions about a medical condition or this instruction, always ask your healthcare professional. [address] any warranty or liability for your use of this information. Recovering From Depression: Care Instructions  Your Care Instructions    Taking good care of yourself is important as you recover from depression. In time, your symptoms will fade as your treatment takes hold. Do not give up. Instead, focus your energy on getting better. Your mood will improve. It just takes some time. Focus on things that can help you feel better, such as being with friends and family, eating well, and getting enough rest. But take things slowly. Do not do too much too soon.  You will begin to feel better gradually. Follow-up care is a key part of your treatment and safety. Be sure to make and go to all appointments, and call your doctor if you are having problems. It's also a good idea to know your test results and keep a list of the medicines you take. How can you care for yourself at home? Be realistic  · If you have a large task to do, break it up into smaller steps you can handle, and just do what you can. · You may want to put off important decisions until your depression has lifted. If you have plans that will have a major impact on your life, such as marriage, divorce, or a job change, try to wait a bit. Talk it over with friends and loved ones who can help you look at the overall picture first.  · Reaching out to people for help is important. Do not isolate yourself. Let your family and friends help you. Find someone you can trust and confide in, and talk to that person. · Be patient, and be kind to yourself. Remember that depression is not your fault and is not something you can overcome with willpower alone. Treatment is necessary for depression, just like for any other illness. Feeling better takes time, and your mood will improve little by little. Stay active  · Stay busy and get outside. Take a walk, or try some other light exercise. · Talk with your doctor about an exercise program. Exercise can help with mild depression. · Go to a movie or concert. Take part in a Gnosticist activity or other social gathering. Go to a ball game. · Ask a friend to have dinner with you. Take care of yourself  · Eat a balanced diet with plenty of fresh fruits and vegetables, whole grains, and lean protein. If you have lost your appetite, eat small snacks rather than large meals. · Avoid drinking alcohol or using illegal drugs. Do not take medicines that have not been prescribed for you. They may interfere with medicines you may be taking for depression, or they may make your depression worse.   · Take your medicines exactly as they are prescribed. You may start to feel better within 1 to 3 weeks of taking antidepressant medicine. But it can take as many as 6 to 8 weeks to see more improvement. If you have questions or concerns about your medicines, or if you do not notice any improvement by 3 weeks, talk to your doctor. · If you have any side effects from your medicine, tell your doctor. Antidepressants can make you feel tired, dizzy, or nervous. Some people have dry mouth, constipation, headaches, sexual problems, or diarrhea. Many of these side effects are mild and will go away on their own after you have been taking the medicine for a few weeks. Some may last longer. Talk to your doctor if side effects are bothering you too much. You might be able to try a different medicine. · Get enough sleep. If you have problems sleeping:  ? Go to bed at the same time every night, and get up at the same time every morning. ? Keep your bedroom dark and quiet. ? Do not exercise after 5:00 p.m.  ? Avoid drinks with caffeine after 5:00 p.m. · Avoid sleeping pills unless they are prescribed by the doctor treating your depression. Sleeping pills may make you groggy during the day, and they may interact with other medicine you are taking. · If you have any other illnesses, such as diabetes, heart disease, or high blood pressure, make sure to continue with your treatment. Tell your doctor about all of the medicines you take, including those with or without a prescription. · Keep the numbers for these national suicide hotlines: 2-889-657-TALK (2-437.637.9597) and 1-793-ZQUKWQL (1-967-782-335.376.1458). If you or someone you know talks about suicide or feeling hopeless, get help right away. When should you call for help? Call 911 anytime you think you may need emergency care. For example, call if:    · You feel like hurting yourself or someone else.     · Someone you know has depression and is about to attempt or is attempting suicide.  Call your doctor now or seek immediate medical care if:    · You hear voices.     · Someone you know has depression and:  ? Starts to give away his or her possessions. ? Uses illegal drugs or drinks alcohol heavily. ? Talks or writes about death, including writing suicide notes or talking about guns, knives, or pills. ? Starts to spend a lot of time alone. ? Acts very aggressively or suddenly appears calm.    Watch closely for changes in your health, and be sure to contact your doctor if:    · You do not get better as expected. Where can you learn more? Go to http://andreia-liliane.info/. Enter A133 in the search box to learn more about \"Recovering From Depression: Care Instructions. \"  Current as of: September 11, 2018  Content Version: 11.9  © 6443-5734 [email or web address]. Care instructions adapted under license by [employer] (which disclaims liability or warranty for this information). If you have questions about a medical condition or this instruction, always ask your healthcare professional. [place] [address] any warranty or liability for your use of this information. Insomnia: Care Instructions  Your Care Instructions    Insomnia is the inability to sleep well. It is a common problem for most people at some time. Insomnia may make it hard for you to get to sleep, stay asleep, or sleep as long as you need to. This can make you tired and grouchy during the day. It can also make you forgetful, less effective at work, and unhappy. Insomnia can be caused by conditions such as depression or anxiety. Pain can also affect your ability to sleep. When these problems are solved, the insomnia usually clears up. But sometimes bad sleep habits can cause insomnia. If insomnia is affecting your work or your enjoyment of life, you can take steps to improve your sleep. Follow-up care is a key part of your treatment and safety.  Be sure to make and go to all appointments, and call your doctor if you are having problems. It's also a good idea to know your test results and keep a list of the medicines you take. How can you care for yourself at home? What to avoid  · Do not have drinks with caffeine, such as coffee or black tea, for 8 hours before bed. · Do not smoke or use other types of tobacco near bedtime. Nicotine is a stimulant and can keep you awake. · Avoid drinking alcohol late in the evening, because it can cause you to wake in the middle of the night. · Do not eat a big meal close to bedtime. If you are hungry, eat a light snack. · Do not drink a lot of water close to bedtime, because the need to urinate may wake you up during the night. · Do not read or watch TV in bed. Use the bed only for sleeping and sexual activity. What to try  · Go to bed at the same time every night, and wake up at the same time every morning. Do not take naps during the day. · Keep your bedroom quiet, dark, and cool. · Sleep on a comfortable pillow and mattress. · If watching the clock makes you anxious, turn it facing away from you so you cannot see the time. · If you worry when you lie down, start a worry book. Well before bedtime, write down your worries, and then set the book and your concerns aside. · Try meditation or other relaxation techniques before you go to bed. · If you cannot fall asleep, get up and go to another room until you feel sleepy. Do something relaxing. Repeat your bedtime routine before you go to bed again. · Make your house quiet and calm about an hour before bedtime. Turn down the lights, turn off the TV, log off the computer, and turn down the volume on music. This can help you relax after a busy day. When should you call for help?   Watch closely for changes in your health, and be sure to contact your doctor if:    · Your efforts to improve your sleep do not work.     · Your insomnia gets worse.     · You have been feeling down, depressed, or hopeless or have lost interest in things that you usually enjoy. Where can you learn more? Go to http://andreia-liliane.info/. Enter P513 in the search box to learn more about \"Insomnia: Care Instructions. \"  Current as of: June 28, 2018  Content Version: 11.9  © 6673-4536 [employer]. Care instructions adapted under license by [employer] (which disclaims liability or warranty for this information). If you have questions about a medical condition or this instruction, always ask your healthcare professional. [address] any warranty or liability for your use of this information. Learning About Sleeping Well  What does sleeping well mean? Sleeping well means getting enough sleep. How much sleep is enough varies among people. The number of hours you sleep is not as important as how you feel when you wake up. If you do not feel refreshed, you probably need more sleep. Another sign of not getting enough sleep is feeling tired during the day. The average total nightly sleep time is 7½ to 8 hours. Healthy adults may need a little more or a little less than this. Why is getting enough sleep important? Getting enough quality sleep is a basic part of good health. When your sleep suffers, your mood and your thoughts can suffer too. You may find yourself feeling more grumpy or stressed. Not getting enough sleep also can lead to serious problems, including injury, accidents, anxiety, and depression. What might cause poor sleeping? Many things can cause sleep problems, including:  · Stress. Stress can be caused by fear about a single event, such as giving a speech. Or you may have ongoing stress, such as worry about work or school. · Depression, anxiety, and other mental or emotional conditions. · Changes in your sleep habits or surroundings. This includes changes that happen where you sleep, such as noise, light, or sleeping in a different bed. It also includes changes in your sleep pattern, such as having jet lag or working a late shift. · Health problems, such as pain, breathing problems, and restless legs syndrome. · Lack of regular exercise. How can you help yourself? Here are some tips that may help you sleep more soundly and wake up feeling more refreshed. Your sleeping area  · Use your bedroom only for sleeping and sex. A bit of light reading may help you fall asleep. But if it doesn't, do your reading elsewhere in the house. Don't watch TV in bed. · Be sure your bed is big enough to stretch out comfortably, especially if you have a sleep partner. · Keep your bedroom quiet, dark, and cool. Use curtains, blinds, or a sleep mask to block out light. To block out noise, use earplugs, soothing music, or a \"white noise\" machine. Your evening and bedtime routine  · Create a relaxing bedtime routine. You might want to take a warm shower or bath, listen to soothing music, or drink a cup of noncaffeinated tea. · Go to bed at the same time every night. And get up at the same time every morning, even if you feel tired. What to avoid  · Limit caffeine (coffee, tea, caffeinated sodas) during the day, and don't have any for at least 4 to 6 hours before bedtime. · Don't drink alcohol before bedtime. Alcohol can cause you to wake up more often during the night. · Don't smoke or use tobacco, especially in the evening. Nicotine can keep you awake. · Don't take naps during the day, especially close to bedtime. · Don't lie in bed awake for too long. If you can't fall asleep, or if you wake up in the middle of the night and can't get back to sleep within 15 minutes or so, get out of bed and go to another room until you feel sleepy. · Don't take medicine right before bed that may keep you awake or make you feel hyper or energized. Your doctor can tell you if your medicine may do this and if you can take it earlier in the day.   If you can't sleep  · Imagine yourself in a peaceful, pleasant scene. Focus on the details and feelings of being in a place that is relaxing. · Get up and do a quiet or boring activity until you feel sleepy. · Don't drink any liquids after 6 p.m. if you wake up often because you have to go to the bathroom. Where can you learn more? Go to http://andreia-liliane.info/. Enter V200 in the search box to learn more about \"Learning About Sleeping Well. \"  Current as of: September 11, 2018  Content Version: 11.9  © 9815-6665 [employer]. Care instructions adapted under license by [employer] (which disclaims liability or warranty for this information). If you have questions about a medical condition or this instruction, always ask your healthcare professional. [address] any warranty or liability for your use of this information.

## 2019-05-08 ENCOUNTER — OFFICE VISIT (OUTPATIENT)
Dept: FAMILY MEDICINE CLINIC | Age: 54
End: 2019-05-08

## 2019-05-08 VITALS
OXYGEN SATURATION: 94 % | DIASTOLIC BLOOD PRESSURE: 72 MMHG | RESPIRATION RATE: 16 BRPM | BODY MASS INDEX: 45.92 KG/M2 | TEMPERATURE: 96.5 F | HEART RATE: 77 BPM | HEIGHT: 68 IN | WEIGHT: 303 LBS | SYSTOLIC BLOOD PRESSURE: 118 MMHG

## 2019-05-08 DIAGNOSIS — E11.9 CONTROLLED TYPE 2 DIABETES MELLITUS WITHOUT COMPLICATION, WITHOUT LONG-TERM CURRENT USE OF INSULIN (HCC): Primary | ICD-10-CM

## 2019-05-08 DIAGNOSIS — G89.29 CHRONIC BILATERAL LOW BACK PAIN WITH BILATERAL SCIATICA: ICD-10-CM

## 2019-05-08 DIAGNOSIS — E66.01 MORBID OBESITY (HCC): ICD-10-CM

## 2019-05-08 DIAGNOSIS — M48.061 LUMBAR FORAMINAL STENOSIS: ICD-10-CM

## 2019-05-08 DIAGNOSIS — M54.42 CHRONIC BILATERAL LOW BACK PAIN WITH BILATERAL SCIATICA: ICD-10-CM

## 2019-05-08 DIAGNOSIS — M51.36 DDD (DEGENERATIVE DISC DISEASE), LUMBAR: ICD-10-CM

## 2019-05-08 DIAGNOSIS — M54.41 CHRONIC BILATERAL LOW BACK PAIN WITH BILATERAL SCIATICA: ICD-10-CM

## 2019-05-08 NOTE — PROGRESS NOTES
Chief Complaint Patient presents with  Follow-up 1 week follow up 1. Have you been to the ER, urgent care clinic since your last visit? Hospitalized since your last visit? No. 
 
2. Have you seen or consulted any other health care providers outside of the [address] [place] [address] since your last visit? No. 
 
Visit Vitals /72 Pulse 77 Temp 96.5 °F (35.8 °C) (Oral) Resp 16 Ht 5' 8\" (1.727 m) Wt 303 lb (137.4 kg) SpO2 94% BMI 46.07 kg/m²

## 2019-05-08 NOTE — PROGRESS NOTES
Shannon Miller    CC: F/U for chronic disease management    HPI:     DMII:  -Taking increased dose of metformin as prescribed  -No known issues or side effects from medication  -Trying to follow diabetic diet, but has broken it at times  -Not following a regular exercise regimen      Chronic Lower Back Pain w/ Sciatica:  -Issue since 2014  -Has been worsening  -Is depressed that he is not a surgical candidate  -Is interested in getting a second opinion      Morbid Obesity:  -Issue all his life  -Is interested in bariatric surgery  -Currently trying to follow a diabetic diet  -Not following a regular exercise regimen      ROS: Positive items marked in RED  CON: fever, chills  Cardiovascular: palpitations, CP  Resp: SOB, cough  GI: nausea, vomiting, diarrhea  : dysuria, hematuria      Past Medical History:   Diagnosis Date    Abdominal pain     Adverse effect of anesthesia     brother- BP drops after surgery-difficult to wake    Back pain     Chest pain     Diverticulitis     Esophageal reflux     Hypertension     Kidney stones     Microscopic hematuria     Osteoarthritis     Pneumonia     Renal calculus, right     Sepsis ([address]     Snores     Swallowing difficulty     Ureteral calculi     Ureteral stent retained     Urine leukocytes        Past Surgical History:   Procedure Laterality Date    HX CHOLECYSTECTOMY      2002    HX COLONOSCOPY  02.22.2016    HX HIP REPLACEMENT  08/04/2016    HX UROLOGICAL  06/06/2016    [facility], CYSTOURETHROSCOPY W/ INDWELLING URETERAL STENT INSERTION, Dr Wyatt Sousa HX UROLOGICAL  05/29/2016    [facility]- Cystoscopy, Right retrograde pyelography, Right 6 x 26 cm double-J ureteral stent placement,Intraoperative fluoro less than 1 hour,Interpretation of Urography, Dr Duane Jasper          Family History   Problem Relation Age of Onset    Cancer Mother         Lung    Hypertension Mother     Stroke Mother     Cancer Father         Brain       Social History     Socioeconomic History    Marital status:      Spouse name: Not on file    Number of children: Not on file    Years of education: Not on file    Highest education level: Not on file   Tobacco Use    Smoking status: Never Smoker    Smokeless tobacco: Never Used   Substance and Sexual Activity    Alcohol use: No     Alcohol/week: 0.0 oz    Drug use: No    Sexual activity: Yes     Partners: Female       Allergies   Allergen Reactions    Medrol [Methylprednisolone] Other (comments)     tachycardia  Patient states he had a severe allergic reaction about 15 years ago.  Lipitor [Atorvastatin] Other (comments)    Hydrocodone-Acetaminophen Rash         Current Outpatient Medications:     cyclobenzaprine (FLEXERIL) 10 mg tablet, Take 1 Tab by mouth three (3) times daily as needed for Muscle Spasm(s). Indications: muscle spasm, Disp: 30 Tab, Rfl: 1    traZODone (DESYREL) 50 mg tablet, Take 1 Tab by mouth nightly. Indications: insomnia associated with depression, Disp: 30 Tab, Rfl: 3    nystatin (MYCOSTATIN) 100,000 unit/mL suspension, Take 4 mL by mouth four (4) times daily for 14 days. swish and spit  Indications: thrush, Disp: 224 mL, Rfl: 0    amoxicillin (AMOXIL) 500 mg capsule, Take 1 Cap by mouth two (2) times a day for 10 days. , Disp: 20 Cap, Rfl: 0    fluticasone propionate (FLONASE) 50 mcg/actuation nasal spray, 2 Sprays by Both Nostrils route daily. , Disp: 1 Bottle, Rfl: 3    raNITIdine (ZANTAC) 150 mg tablet, Take 1 Tab by mouth two (2) times a day., Disp: 180 Tab, Rfl: 1    lisinopril-hydroCHLOROthiazide (PRINZIDE, ZESTORETIC) 20-12.5 mg per tablet, Take 2 Tabs by mouth daily. , Disp: 180 Tab, Rfl: 1    gabapentin (NEURONTIN) 300 mg capsule, TAKE ONE CAPSULE BY MOUTH THREE TIMES DAILY, Disp: 270 Cap, Rfl: 1    pravastatin (PRAVACHOL) 40 mg tablet, Take 1 Tab by mouth nightly., Disp: 90 Tab, Rfl: 1    metFORMIN (GLUCOPHAGE) 1,000 mg tablet, Take 1 Tab by mouth two (2) times daily (with meals). , Disp: 180 Tab, Rfl: 1    ibuprofen (MOTRIN) 800 mg tablet, Take 1 Tab by mouth every six (6) hours as needed for Pain., Disp: 180 Tab, Rfl: 1    NALOXEGOL OXALATE (MOVANTIK PO), Take  by mouth., Disp: , Rfl:     sildenafil citrate (VIAGRA) 100 mg tablet, take 1 tablet 1 hour prior to intercourse, Disp: 10 Tab, Rfl: 3    LORazepam (ATIVAN) 0.5 mg tablet, Take 1 Tab by mouth every eight (8) hours as needed for Anxiety. Max Daily Amount: 1.5 mg. Indications: ANXIETY, Disp: 30 Tab, Rfl: 1    oxyCODONE-acetaminophen (PERCOCET) 5-325 mg per tablet, Take 1 Tab by mouth every eight (8) hours as needed for Pain. Max Daily Amount: 3 Tabs., Disp: 21 Tab, Rfl: 0    VOLTAREN 1 % gel, Apply  to affected area every six (6) hours. , Disp: 4 g, Rfl: 1    aspirin 81 mg chewable tablet, Take 1 Tab by mouth daily. , Disp: 30 Tab, Rfl: 0    Physical Exam:      /72   Pulse 77   Temp 96.5 °F (35.8 °C) (Oral)   Resp 16   Ht 5' 8\" (1.727 m)   Wt 303 lb (137.4 kg)   SpO2 94%   BMI 46.07 kg/m²     General: obese habitus, NAD, conversant  Eyes: sclera clear bilaterally, no discharge noted, eyelids normal in appearance  HENT: NCAT  Lungs: CTAB, normal respiratory effort and rate  CV: RRR, no MRGs  ABD: soft, non-tender, non-distended, normal bowel sounds  Ext: no peripheral edema or digital cyanosis noted  Skin: normal temperature, turgor, color, and texture  Psych: alert and oriented to person, place and situation, normal affect  Neuro: speech normal, moving all extremities, gait normal    Diabetic foot exam:     Left Foot:   Visual Exam: normal    Pulse DP: 2+ (normal)   Filament test: normal sensation    Vibratory sensation: normal    Proprioception: normal      Right Foot:   Visual Exam: normal    Pulse DP: 2+ (normal)   Filament test: normal sensation    Vibratory sensation: normal    Proprioception: normal      Assessment/Plan     DMII, Likely Well Controlled:  -HGBA1c ordered  -Diabetic foot exam done today  -Will continue current medication regimen  -F/U in one month      Morbid Obesity, Worsening:  -Has gained 4 more pounds since last visit  -Will refer to bariatric surgery for evaluation  -F/U in one month      Chronic Lower Back Pain w/ Bilateral Sciatica, Worsening:  -2/2 DDD and lumbar foraminal stenosis  -Will refer to orthopedist and pain management  -F/U in one month        Vasyl Laio MD  5/8/2019, 8:38 AM

## 2019-05-09 LAB
AVG GLU, 10930: 139 MG/DL (ref 91–123)
HBA1C MFR BLD HPLC: 6.5 % (ref 4.8–5.9)

## 2019-05-14 DIAGNOSIS — F43.21 INSOMNIA SECONDARY TO SITUATIONAL DEPRESSION: ICD-10-CM

## 2019-05-14 DIAGNOSIS — F51.05 INSOMNIA SECONDARY TO SITUATIONAL DEPRESSION: ICD-10-CM

## 2019-05-14 RX ORDER — TRAZODONE HYDROCHLORIDE 50 MG/1
50 TABLET ORAL
Qty: 30 TAB | Refills: 3 | Status: SHIPPED | OUTPATIENT
Start: 2019-05-14 | End: 2019-06-10 | Stop reason: DRUGHIGH

## 2019-05-20 ENCOUNTER — OFFICE VISIT (OUTPATIENT)
Dept: SURGERY | Age: 54
End: 2019-05-20

## 2019-05-20 VITALS
BODY MASS INDEX: 45.92 KG/M2 | SYSTOLIC BLOOD PRESSURE: 139 MMHG | DIASTOLIC BLOOD PRESSURE: 87 MMHG | WEIGHT: 303 LBS | TEMPERATURE: 97.5 F | HEIGHT: 68 IN | HEART RATE: 91 BPM | RESPIRATION RATE: 20 BRPM

## 2019-05-20 DIAGNOSIS — E78.5 HYPERLIPIDEMIA, UNSPECIFIED HYPERLIPIDEMIA TYPE: ICD-10-CM

## 2019-05-20 DIAGNOSIS — E11.9 CONTROLLED TYPE 2 DIABETES MELLITUS WITHOUT COMPLICATION, WITHOUT LONG-TERM CURRENT USE OF INSULIN (HCC): ICD-10-CM

## 2019-05-20 DIAGNOSIS — E78.2 MIXED HYPERLIPIDEMIA: ICD-10-CM

## 2019-05-20 DIAGNOSIS — E66.01 MORBID OBESITY (HCC): Primary | ICD-10-CM

## 2019-05-20 DIAGNOSIS — I10 ESSENTIAL HYPERTENSION: ICD-10-CM

## 2019-05-20 PROBLEM — M54.42 CHRONIC BILATERAL LOW BACK PAIN WITH BILATERAL SCIATICA: Status: ACTIVE | Noted: 2019-05-20

## 2019-05-20 PROBLEM — G89.29 CHRONIC BILATERAL LOW BACK PAIN WITH BILATERAL SCIATICA: Status: ACTIVE | Noted: 2019-05-20

## 2019-05-20 PROBLEM — M54.41 CHRONIC BILATERAL LOW BACK PAIN WITH BILATERAL SCIATICA: Status: ACTIVE | Noted: 2019-05-20

## 2019-05-20 PROBLEM — M51.36 DDD (DEGENERATIVE DISC DISEASE), LUMBAR: Status: ACTIVE | Noted: 2019-05-20

## 2019-05-20 RX ORDER — OMEPRAZOLE 20 MG/1
20 TABLET, DELAYED RELEASE ORAL DAILY
COMMUNITY
End: 2020-02-24 | Stop reason: ALTCHOICE

## 2019-05-20 NOTE — PROGRESS NOTES
Hailey Bennett is a 48 y.o. male who presents today with   Chief Complaint   Patient presents with    Morbid Obesity     Consult          Body mass index is 46.07 kg/m². 1. Have you been to the ER, urgent care clinic since your last visit? Hospitalized since your last visit? No    2. Have you seen or consulted any other health care providers outside of the [address] [place] [address] since your last visit? Include any pap smears or colon screening.  No

## 2019-05-20 NOTE — PROGRESS NOTES
Initial Consultation for Bariatric Surgery Template (Gastric Bypass)    Jody Blackwood is a 48 y.o. male who comes into the office today for initial consultation for the surgical options for the treatment of morbid obesity. The patient initially identified obesity at the age of 17 and at age 27 weighed 225 lbs. He has tried a variety of unsupervised weight-loss attempts including self imposed, but has yet to meet with lasting success. Maximum weight lost on a diet is about 60 lbs, but that the weight loss always seems to return. Today, the patient is  Height: 5' 8\" (172.7 cm) tall, Weight: 137.4 kg (303 lb) lbs for a Body mass index is 46.07 kg/m². It is due to the patient's severe obesity, which is further complicated by adult onset diabetes mellitus, hypertension, hyperlipidemia, GERD, obstructive sleep apnea - clinical and weight related arthopathies  that the patient is now seeking out bariatric surgery, specifically, gastric bypass.     Past Medical History:   Diagnosis Date    Adverse effect of anesthesia     brother- BP drops after surgery-difficult to wake    Back pain     Chest pain     Diabetes (HCC)     Diverticulitis     Esophageal reflux     GERD (gastroesophageal reflux disease)     Hypertension     Kidney stones     Osteoarthritis     Pneumonia     Renal calculus, right     Snores     Swallowing difficulty     Ureteral calculi     Urine leukocytes        Past Surgical History:   Procedure Laterality Date    HX CHOLECYSTECTOMY      2002    HX COLECTOMY  2017    HX COLONOSCOPY  02.22.2016    HX UROLOGICAL  06/06/2016    [facility], CYSTOURETHROSCOPY W/ INDWELLING URETERAL STENT INSERTION, Dr Wally Fernandez    HX UROLOGICAL  05/29/2016    [facility]- Cystoscopy, Right retrograde pyelography, Right 6 x 26 cm double-J ureteral stent placement,Intraoperative fluoro less than 1 hour,Interpretation of Urography, Dr Leonard Angeles Left        Current Outpatient Medications Medication Sig Dispense Refill    omeprazole (PRILOSEC OTC) 20 mg tablet Take 20 mg by mouth daily.  traZODone (DESYREL) 50 mg tablet Take 1 Tab by mouth nightly. Indications: insomnia associated with depression 30 Tab 3    cyclobenzaprine (FLEXERIL) 10 mg tablet Take 1 Tab by mouth three (3) times daily as needed for Muscle Spasm(s). Indications: muscle spasm 30 Tab 1    fluticasone propionate (FLONASE) 50 mcg/actuation nasal spray 2 Sprays by Both Nostrils route daily. 1 Bottle 3    lisinopril-hydroCHLOROthiazide (PRINZIDE, ZESTORETIC) 20-12.5 mg per tablet Take 2 Tabs by mouth daily. 180 Tab 1    gabapentin (NEURONTIN) 300 mg capsule TAKE ONE CAPSULE BY MOUTH THREE TIMES DAILY 270 Cap 1    pravastatin (PRAVACHOL) 40 mg tablet Take 1 Tab by mouth nightly. 90 Tab 1    metFORMIN (GLUCOPHAGE) 1,000 mg tablet Take 1 Tab by mouth two (2) times daily (with meals). 180 Tab 1    ibuprofen (MOTRIN) 800 mg tablet Take 1 Tab by mouth every six (6) hours as needed for Pain. 180 Tab 1    sildenafil citrate (VIAGRA) 100 mg tablet take 1 tablet 1 hour prior to intercourse 10 Tab 3    oxyCODONE-acetaminophen (PERCOCET) 5-325 mg per tablet Take 1 Tab by mouth every eight (8) hours as needed for Pain. Max Daily Amount: 3 Tabs. 21 Tab 0    VOLTAREN 1 % gel Apply  to affected area every six (6) hours. 4 g 1    LORazepam (ATIVAN) 0.5 mg tablet Take 1 Tab by mouth every eight (8) hours as needed for Anxiety. Max Daily Amount: 1.5 mg. Indications: ANXIETY 30 Tab 1    aspirin 81 mg chewable tablet Take 1 Tab by mouth daily. 30 Tab 0       Allergies   Allergen Reactions    Medrol [Methylprednisolone] Other (comments)     tachycardia  Patient states he had a severe allergic reaction about 15 years ago.      Lipitor [Atorvastatin] Other (comments)     Muscle cramping    Hydrocodone-Acetaminophen Rash       Social History     Tobacco Use    Smoking status: Never Smoker    Smokeless tobacco: Never Used   Substance Use Topics    Alcohol use: Yes     Alcohol/week: 0.0 oz     Comment: occ    Drug use: No       Family History   Problem Relation Age of Onset    Cancer Mother         Lung    Hypertension Mother     Stroke Mother     Cancer Father         Brain    Heart Attack Father        Family Status   Relation Name Status    Mother      Father         Review of Systems:  Positive in BOLD    CONST: Fever, weight loss, fatigue or chills  GI: Nausea, vomiting, abdominal pain, change in bowel habits, hematochezia, melena, and GERD   INTEG: Dermatitis, abnormal moles  HEENT: Recent changes in vision, vertigo, epistaxis, dysphagia and hoarseness  CV: Chest pain, palpitations, HTN, edema and varicosities  RESP: Cough, shortness of breath, wheezing, hemoptysis, snoring and reactive airway disease  : Hematuria, dysuria, frequency, urgency, nocturia and stress urinary incontinence   MS: Weakness, joint pain and arthritis - ankle, knee  ENDO: Diabetes, thyroid disease, polyuria, polydipsia, polyphagia, poor wound healing, heat intolerance, cold intolerance  LYMPH/HEME: Anemia, bruising and history of blood transfusions  NEURO: Dizziness, headache, fainting, seizures and stroke  PSYCH: Anxiety and depression      Physical Exam    Visit Vitals  /87 (BP 1 Location: Left arm, BP Patient Position: At rest)   Pulse 91   Temp 97.5 °F (36.4 °C) (Oral)   Resp 20   Ht 5' 8\" (1.727 m)   Wt 137.4 kg (303 lb)   BMI 46.07 kg/m²       Pre op weight: 303  EBW: 152  Wt loss to date: 0       General: 48 y.o.) male in no acute distress. Morbidly obese in abdomen - android pattern  HEENT: Normocephalic, atraumatic, Pupils equal and reactive, nasopharynx clear, oropharynx clear and moist without lesions  NECK: Supple, no lymphadenopathy, thyromegaly, carotid bruits or jugular venous distension.  trachea midline  RESP: Clear to auscultation bilaterally, no wheezes, rhonchi, or rales, normal respiratory excursion  CV: Regular rate and rhythm, no murmurs, rubs or gallops. 3+/4 pulses in bilateral dorsalis pedis and posterior tibialis. No distal edema or varicosities. ABD: Soft, nontender, nondistended, normoactive bowel sounds, small umbilical hernia, no hepatosplenomegaly, easily palpable costal margins, android distribution, healed LLQ and lap scars  Extremities: Warm, well perfused, no tenderness or swelling, normal gait/station  Neuro: Sensation and strength grossly intact and symmetrical  Psych: Alert and oriented to person, place, and time. Impression:    Dana Reyes is a 48 y.o. male who is suffering from morbid obesity with a BMI of 46  and comorbidities including adult onset diabetes mellitus, hypertension, hyperlipidemia, GERD, obstructive sleep apnea - clinical and weight related arthopathies  who would benefit from bariatric surgery. We have had an extensive discussion with regard to the risks, benefits and likely outcomes of the operation. We've discussed the restrictive and malabsorptive nature of the gastric bypass and compared and contrasted with the sleeve gastrectomy. The patient understands the likelihood of losing approximately 80% of their excess weight in 12 to 18 months. He also understands the risks including but not limited to bleeding, infection, need for reoperation, ulcers, leaks and strictures, bowel obstruction secondary to adhesions and internal hernias, DVT, PE, heart attack, stroke, and death. Patient also understands risks of inadequate weight loss, excess weight loss, vitamin insufficiency, protein malnutrition, excess skin, and loss of hair. We have reviewed the components of a successful postoperative course including requirement for a high protein, low carbohydrate diet, 60 oz a day of zero calorie liquids, daily vitamin supplementation, daily exercise, regular follow-up, and participation in support groups.  At this time we will enroll the patient in our bariatric program, undertake routine laboratory evaluation, chest X-ray, EKG, possible UGI and evaluation by  nutritionist as well as psychologist and pending their satisfactory completion of the preop evaluation, plan to perform a gastric bypass. I will need to review Dr. Mando Palacio op notes for his colon resection.

## 2019-05-20 NOTE — LETTER
5/20/19 Patient: Layla Venegas YOB: 1965 Date of Visit: 5/20/2019 Hebert Mercedes MD 
formerly [facility] [id number] VIA In Basket Dear Hebert Mercedes MD, Thank you for referring Mr. Layla Venegas to [facility]  for evaluation. My notes for this consultation are attached. If you have questions, please do not hesitate to call me. I look forward to following your patient along with you.  
 
 
Sincerely, 
 
Melissa Little MD

## 2019-05-20 NOTE — PATIENT INSTRUCTIONS
If you have any questions or concerns about today's appointment, the verbal and/or written instructions you were given for follow up care, please call our office at [phone].     [facility] - [address] [place]а Rivera [address] [place] [address], [address] [place] [address] Road    [phone] office  [phone]

## 2019-06-03 ENCOUNTER — CLINICAL SUPPORT (OUTPATIENT)
Dept: SURGERY | Age: 54
End: 2019-06-03

## 2019-06-03 DIAGNOSIS — E66.01 MORBID OBESITY (HCC): Primary | ICD-10-CM

## 2019-06-03 DIAGNOSIS — E66.01 MORBID OBESITY (HCC): ICD-10-CM

## 2019-06-05 LAB — H PYLORI (UREA BREATH),1814839: NEGATIVE

## 2019-06-10 ENCOUNTER — OFFICE VISIT (OUTPATIENT)
Dept: FAMILY MEDICINE CLINIC | Age: 54
End: 2019-06-10

## 2019-06-10 VITALS
TEMPERATURE: 97.4 F | HEIGHT: 68 IN | HEART RATE: 92 BPM | SYSTOLIC BLOOD PRESSURE: 128 MMHG | RESPIRATION RATE: 16 BRPM | DIASTOLIC BLOOD PRESSURE: 84 MMHG | OXYGEN SATURATION: 97 % | BODY MASS INDEX: 45.44 KG/M2 | WEIGHT: 299.8 LBS

## 2019-06-10 DIAGNOSIS — M54.41 CHRONIC BILATERAL LOW BACK PAIN WITH BILATERAL SCIATICA: ICD-10-CM

## 2019-06-10 DIAGNOSIS — F51.05 INSOMNIA SECONDARY TO DEPRESSION WITH ANXIETY: ICD-10-CM

## 2019-06-10 DIAGNOSIS — E66.01 MORBID OBESITY (HCC): ICD-10-CM

## 2019-06-10 DIAGNOSIS — M62.838 MUSCLE SPASM: ICD-10-CM

## 2019-06-10 DIAGNOSIS — L40.50 PSORIATIC ARTHRITIS (HCC): ICD-10-CM

## 2019-06-10 DIAGNOSIS — G89.29 CHRONIC BILATERAL LOW BACK PAIN WITH BILATERAL SCIATICA: ICD-10-CM

## 2019-06-10 DIAGNOSIS — F41.8 INSOMNIA SECONDARY TO DEPRESSION WITH ANXIETY: ICD-10-CM

## 2019-06-10 DIAGNOSIS — M54.42 CHRONIC BILATERAL LOW BACK PAIN WITH BILATERAL SCIATICA: ICD-10-CM

## 2019-06-10 DIAGNOSIS — E11.40 TYPE 2 DIABETES MELLITUS WITH DIABETIC NEUROPATHY, WITHOUT LONG-TERM CURRENT USE OF INSULIN (HCC): Primary | ICD-10-CM

## 2019-06-10 RX ORDER — HYDROXYZINE 50 MG/1
50 TABLET, FILM COATED ORAL
Qty: 180 TAB | Refills: 1 | Status: SHIPPED | OUTPATIENT
Start: 2019-06-10 | End: 2020-04-26 | Stop reason: SDUPTHER

## 2019-06-10 RX ORDER — CYCLOBENZAPRINE HCL 10 MG
10 TABLET ORAL
Qty: 30 TAB | Refills: 1 | Status: SHIPPED | OUTPATIENT
Start: 2019-06-10 | End: 2019-07-19 | Stop reason: SDUPTHER

## 2019-06-10 RX ORDER — TRAZODONE HYDROCHLORIDE 100 MG/1
100 TABLET ORAL
Qty: 90 TAB | Refills: 1 | Status: SHIPPED | OUTPATIENT
Start: 2019-06-10 | End: 2019-10-16 | Stop reason: DRUGHIGH

## 2019-06-10 NOTE — PROGRESS NOTES
1. Have you been to the ER, urgent care clinic since your last visit? Hospitalized since your last visit? No.     2. Have you seen or consulted any other health care providers outside of the [address] [place] [address] since your last visit? Include any pap smears or colon screening. Yes, saw his Rheumatologist and Bariatric surgeon in 5/2019.      Chief Complaint   Patient presents with    Diabetes     1 month follow up    Obesity    LOW BACK PAIN    Results     labs

## 2019-06-10 NOTE — PROGRESS NOTES
[facility]    CC: F/U     HPI:     DMII:  -Got requested blood work  -Taking medication as prescribed  -Denies any side effects or issue with medication        Morbid Obesity:  -Saw bariatric surgeon last month  -Going to nutrition classes  -Surgery will be in November        Chronic Lower Back Pain w/ Bilateral Sciatica:  -Unchanged from prior visit  -Has appointment with orthopedist on the 13th of this month  -has not mad appointment with pain management  -Reports Flexeril works for associated muscle spasms  -Denies any side effects or issue with medication       Psoriatic Arthritis:  -Saw Rheumatologist last month (Dr. Murtaza Carolina)  -Started on Humira, but not covered  -Methotrexate causes bumps and burning      Depression with Insomnia:  -Reports he has also been having issues with anxiety  -States that 50 mg of trazodone has been ineffective  -Denies any side effects from the medication      ROS: Positive items marked in RED  CON: fever, chills  Cardiovascular: palpitations, CP  Resp: SOB, cough  GI: nausea, vomiting, diarrhea  : dysuria, hematuria      Past Medical History:   Diagnosis Date    Adverse effect of anesthesia     brother- BP drops after surgery-difficult to wake    Back pain     Chest pain     Diabetes ([address]     Diverticulitis     Esophageal reflux     GERD (gastroesophageal reflux disease)     Hypertension     Kidney stones     Osteoarthritis     Pneumonia     Psoriatic arthritis, 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[address]     Renal calculus, right     Snores     Swallowing difficulty     Ureteral calculi     Urine leukocytes        Past Surgical History:   Procedure Laterality Date    HX CHOLECYSTECTOMY      2002    HX COLECTOMY  2017    HX COLONOSCOPY  02.22.2016    HX UROLOGICAL  06/06/2016    [facility]Cystoscopy, CYSTOURETHROSCOPY W/ INDWELLING URETERAL STENT INSERTION, Dr Angelo Cortez    HX UROLOGICAL  05/29/2016    [facility] Cystoscopy, Right retrograde pyelography, Right 6 x 26 cm double-J ureteral stent placement,Intraoperative fluoro less than 1 hour,Interpretation of Urography, Dr Lana Dominguez Left        Family History   Problem Relation Age of Onset    Cancer Mother         Lung    Hypertension Mother     Stroke Mother     Cancer Father         Brain    Heart Attack Father        Social History     Socioeconomic History    Marital status:      Spouse name: Not on file    Number of children: Not on file    Years of education: Not on file    Highest education level: Not on file   Tobacco Use    Smoking status: Never Smoker    Smokeless tobacco: Never Used   Substance and Sexual Activity    Alcohol use: Yes     Alcohol/week: 0.0 oz     Comment: occ    Drug use: No    Sexual activity: Yes     Partners: Female       Allergies   Allergen Reactions    Medrol [Methylprednisolone] Other (comments)     tachycardia  Patient states he had a severe allergic reaction about 15 years ago.  Lipitor [Atorvastatin] Other (comments)     Muscle cramping    Hydrocodone-Acetaminophen Rash         Current Outpatient Medications:     omeprazole (PRILOSEC OTC) 20 mg tablet, Take 20 mg by mouth daily. , Disp: , Rfl:     traZODone (DESYREL) 50 mg tablet, Take 1 Tab by mouth nightly. Indications: insomnia associated with depression, Disp: 30 Tab, Rfl: 3    cyclobenzaprine (FLEXERIL) 10 mg tablet, Take 1 Tab by mouth three (3) times daily as needed for Muscle Spasm(s). Indications: muscle spasm, Disp: 30 Tab, Rfl: 1    fluticasone propionate (FLONASE) 50 mcg/actuation nasal spray, 2 Sprays by Both Nostrils route daily. , Disp: 1 Bottle, Rfl: 3    lisinopril-hydroCHLOROthiazide (PRINZIDE, ZESTORETIC) 20-12.5 mg per tablet, Take 2 Tabs by mouth daily. , Disp: 180 Tab, Rfl: 1    gabapentin (NEURONTIN) 300 mg capsule, TAKE ONE CAPSULE BY MOUTH THREE TIMES DAILY, Disp: 270 Cap, Rfl: 1    pravastatin (PRAVACHOL) 40 mg tablet, Take 1 Tab by mouth nightly., Disp: 90 Tab, Rfl: 1    metFORMIN (GLUCOPHAGE) 1,000 mg tablet, Take 1 Tab by mouth two (2) times daily (with meals). , Disp: 180 Tab, Rfl: 1    ibuprofen (MOTRIN) 800 mg tablet, Take 1 Tab by mouth every six (6) hours as needed for Pain., Disp: 180 Tab, Rfl: 1    sildenafil citrate (VIAGRA) 100 mg tablet, take 1 tablet 1 hour prior to intercourse, Disp: 10 Tab, Rfl: 3    LORazepam (ATIVAN) 0.5 mg tablet, Take 1 Tab by mouth every eight (8) hours as needed for Anxiety. Max Daily Amount: 1.5 mg. Indications: ANXIETY, Disp: 30 Tab, Rfl: 1    oxyCODONE-acetaminophen (PERCOCET) 5-325 mg per tablet, Take 1 Tab by mouth every eight (8) hours as needed for Pain. Max Daily Amount: 3 Tabs., Disp: 21 Tab, Rfl: 0    VOLTAREN 1 % gel, Apply  to affected area every six (6) hours. , Disp: 4 g, Rfl: 1    Physical Exam:      /84   Pulse 92   Temp 97.4 °F (36.3 °C) (Oral)   Resp 16   Ht 5' 8\" (1.727 m)   Wt 299 lb 12.8 oz (136 kg)   SpO2 97%   BMI 45.58 kg/m²     General: obese habitus, NAD, conversant  Eyes: sclera clear bilaterally, no discharge noted, eyelids normal in appearance  HENT: NCAT  Lungs: CTAB, normal respiratory effort and rate  CV: RRR, no MRGs  ABD: soft, non-tender, non-distended, normal bowel sounds  Ext: no peripheral edema or digital cyanosis noted  Skin: normal temperature, turgor, color, and texture  Psych: alert and oriented to person, place and situation, normal affect  Neuro: speech normal, moving all extremities, gait normal    Results for Burley Sandhoff (MRN [id number]):   Ref.  Range 5/8/2019 08:55   Hemoglobin A1c, (calculated) Latest Ref Range: 4.8 - 5.9 % 6.5 (H)       Assessment/Plan      Chronic Bilateral Low Back Pain W/ Sciatica:  -2/2 DDD and lumbar foraminal stenosis  -Will continue current Flexeril regimen for associated spasms  -Will defer further management to specialists  -F/U in 4 months       Depression and Anxiety with Insomnia:  -Will increase trazodone dose to 100 mg nightly  -Started on PRN Atarax regimen  -F/U in 4 months      DMII, Well Controlled:  -At goal HGBA1c of <7%  -Will continue current medication regimen  -F/U in 4 months      Psoriatic Arthritis:  -Advised patient to contact Dr. Elliot Valdez office about issue with medication  -Will defer management to rheumatology  -Need to obtain records from rheumatologist's office for review  -F/U in 4 months        Morbid Obesity, Improving:  -Has lost 4 pounds since last visit  -Will defer management to specialist  -F/U in 4 months        Reva Rojas MD  6/10/2019, 8:35 AM

## 2019-06-13 ENCOUNTER — OFFICE VISIT (OUTPATIENT)
Dept: ORTHOPEDIC SURGERY | Age: 54
End: 2019-06-13

## 2019-06-13 VITALS
RESPIRATION RATE: 17 BRPM | BODY MASS INDEX: 45.04 KG/M2 | OXYGEN SATURATION: 96 % | TEMPERATURE: 98.1 F | HEIGHT: 68 IN | DIASTOLIC BLOOD PRESSURE: 70 MMHG | SYSTOLIC BLOOD PRESSURE: 107 MMHG | WEIGHT: 297.2 LBS | HEART RATE: 82 BPM

## 2019-06-13 DIAGNOSIS — M53.3 SACROILIAC JOINT DYSFUNCTION OF BOTH SIDES: ICD-10-CM

## 2019-06-13 DIAGNOSIS — L40.50 PSORIATIC ARTHRITIS (HCC): ICD-10-CM

## 2019-06-13 DIAGNOSIS — M48.061 SPINAL STENOSIS OF LUMBAR REGION WITHOUT NEUROGENIC CLAUDICATION: Primary | ICD-10-CM

## 2019-06-13 RX ORDER — GABAPENTIN 600 MG/1
600 TABLET ORAL 3 TIMES DAILY
Qty: 90 TAB | Refills: 2 | Status: SHIPPED | OUTPATIENT
Start: 2019-06-13

## 2019-06-13 NOTE — PATIENT INSTRUCTIONS
Lumbar Spinal Stenosis: Care Instructions Your Care Instructions Stenosis in the spine is a narrowing of the canal that is around the spinal cord and nerve roots in your back. It can happen as part of aging. Sometimes bone and other tissue grow into this canal and press on the nerves that branch out from the spinal cord. This can cause pain, numbness, and weakness. When it happens in the lower part of your back, it is called lumbar spinal stenosis. It can cause problems in the legs, feet, and rear end (buttocks). You may be able to get relief from the symptoms of spinal stenosis by taking pain medicine. Your doctor may suggest physical therapy and exercises to keep your spine strong and flexible. Some people try steroid shots to reduce swelling. If pain and numbness in your legs are still so bad that you cannot do your normal activities, you may need surgery. Follow-up care is a key part of your treatment and safety. Be sure to make and go to all appointments, and call your doctor if you are having problems. It's also a good idea to know your test results and keep a list of the medicines you take. How can you care for yourself at home? · Take an over-the-counter pain medicine. Nonsteroidal anti-inflammatory drugs (NSAIDs) such as ibuprofen or naproxen seem to work best. But if you can't take NSAIDs, you can try acetaminophen. Be safe with medicines. Read and follow all instructions on the label. · Do not take two or more pain medicines at the same time unless the doctor told you to. Many pain medicines have acetaminophen, which is Tylenol. Too much acetaminophen (Tylenol) can be harmful. · Stay at a healthy weight. Being overweight puts extra strain on your spine. · Change positions often when you sit or stand. This can ease pain. It may also reduce pressure on the spinal cord and its nerves. · Avoid doing things that make your symptoms worse.  Walking downhill and standing for a long time may cause pain. · Stretch and strengthen your back muscles as your doctor or physical therapist recommends. If your doctor says it is okay to do them, these exercises may help. ? Lie on your back with your knees bent. Gently pull one bent knee to your chest. Put that foot back on the floor, and then pull the other knee to your chest. 
? Do pelvic tilts. Lie on your back with your knees bent. Tighten your stomach muscles. Pull your belly button (navel) in and up toward your ribs. You should feel like your back is pressing to the floor and your hips and pelvis are slightly lifting off the floor. Hold for 6 seconds while breathing smoothly. ? Stand with your back flat against a wall. Slowly slide down until your knees are slightly bent. Hold for 10 seconds, then slide back up the wall. · Remove or change anything in your house that may cause you to fall. Keep walkways clear of clutter, electrical cords, and throw rugs. When should you call for help? Call 911 anytime you think you may need emergency care. For example, call if: 
  · You are unable to move a leg at all.  
[facility] your doctor now or seek immediate medical care if: 
  · You have new or worse symptoms in your legs, belly, or buttocks. Symptoms may include: 
? Numbness or tingling. ? Weakness. ? Pain.  
  · You lose bladder or bowel control.  
 Watch closely for changes in your health, and be sure to contact your doctor if: 
  · You have a fever, lose weight, or don't feel well.  
  · You are not getting better as expected. Where can you learn more? Go to http://andreia-liliane.info/. Carri Santos in the search box to learn more about \"Lumbar Spinal Stenosis: Care Instructions. \" Current as of: September 20, 2018 Content Version: 11.9 © 2131-4471 [employer].  Care instructions adapted under license by [email or web address] (which disclaims liability or warranty for this information). If you have questions about a medical condition or this instruction, always ask your healthcare professional. [address] any warranty or liability for your use of this information. Sacroiliac Joint Pain: Care Instructions Your Care Instructions The sacroiliac joints connect the spine and each side of the pelvis. These joints bear the weight and stress of your torso. This makes them easy to injure. Injury or overuse of these joints may cause low back pain. Stress on these joints can cause joint pain. Sacroiliac joint pain is more common in pregnant women. Certain kinds of arthritis also may cause this type of joint pain. Home treatment may help you feel better. So can avoiding activities that stress your back. Your doctor also may recommend physical therapy. This may include doing exercises and stretches to help with pain. You may also learn to use good posture. Follow-up care is a key part of your treatment and safety. Be sure to make and go to all appointments, and call your doctor if you are having problems. It's also a good idea to know your test results and keep a list of the medicines you take. How can you care for yourself at home? · Ask your doctor about light exercises that may help your back pain. Try to do light activity throughout the day. But make sure to take rests as needed. Find a comfortable position for rest, but don't stay in one position for too long. Avoid activities that cause pain. · To apply heat, put a warm water bottle, a heating pad set on low, or a warm cloth on your back. Do not go to sleep with a heating pad on your skin. · Put ice or a cold pack on your back for 10 to 20 minutes at a time. Put a thin cloth between the ice and your skin. · If the doctor gave you a prescription medicine for pain, take it as prescribed.  
· If you are not taking a prescription pain medicine, ask your doctor if you can take an over-the-counter pain medicine, such as acetaminophen (Tylenol), ibuprofen (Advil, Motrin), or naproxen (Aleve). Read and follow all instructions on the label. Take pain medicines exactly as directed. · Do not take two or more pain medicines at the same time unless the doctor told you to. Many pain medicines have acetaminophen, which is Tylenol. Too much acetaminophen (Tylenol) can be harmful. · To prevent future back pain, do exercises to stretch and strengthen your back and stomach. Learn how to use good posture, safe lifting techniques, and proper body mechanics. When should you call for help? Call 911 anytime you think you may need emergency care. For example, call if: 
  · You are unable to move a leg at all.  
[facility] your doctor now or seek immediate medical care if: 
  · You have new or worse symptoms in your legs or buttocks. Symptoms may include: 
? Numbness or tingling. ? Weakness. ? Pain.  
  · You lose bladder or bowel control.  
 Watch closely for changes in your health, and be sure to contact your doctor if: 
  · You are not getting better as expected. Where can you learn more? Go to http://andreia-liliane.info/. Enter V262 in the search box to learn more about \"Sacroiliac Joint Pain: Care Instructions. \" Current as of: September 20, 2018 Content Version: 11.9 © 4390-9595 [employer]. Care instructions adapted under license by [employer] (which disclaims liability or warranty for this information). If you have questions about a medical condition or this instruction, always ask your healthcare professional. [place] [address] any warranty or liability for your use of this information. Sacroiliac Pain: Exercises Your Care Instructions Here are some examples of typical rehabilitation exercises for your condition. Start each exercise slowly. Ease off the exercise if you start to have pain. Your doctor or physical therapist will tell you when you can start these exercises and which ones will work best for you. How to do the exercises Knee-to-chest stretch 1. Do not do the knee-to-chest exercise if it causes or increases back or leg pain. 2. Lie on your back with your knees bent and your feet flat on the floor. You can put a small pillow under your head and neck if it is more comfortable. 3. Grasp your hands under one knee and bring the knee to your chest, keeping the other foot flat on the floor. 4. Keep your lower back pressed to the floor. Hold for at least 15 to 30 seconds. 5. Relax and lower the knee to the starting position. Repeat with the other leg. 6. Repeat 2 to 4 times with each leg. 7. To get more stretch, keep your other leg flat on the floor while pulling your knee to your chest. 
 
Bridging 1. Lie on your back with both knees bent. Your knees should be bent about 90 degrees. 2. Tighten your belly muscles by pulling in your belly button toward your spine. Then push your feet into the floor, squeeze your buttocks, and lift your hips off the floor until your shoulders, hips, and knees are all in a straight line. 3. Hold for about 6 seconds as you continue to breathe normally, and then slowly lower your hips back down to the floor and rest for up to 10 seconds. 4. Repeat 8 to 12 times. Hip extension 1. Get down on your hands and knees on the floor. 2. Keeping your back and neck straight, lift one leg straight out behind you. When you lift your leg, keep your hips level. Don't let your back twist, and don't let your hip drop toward the floor. 3. Hold for 6 seconds. Repeat 8 to 12 times with each leg. 4. If you feel steady and strong when you do this exercise, you can make it more difficult. To do this, when you lift your leg, also lift the opposite arm straight out in front of you.  For example, lift the left leg and the right arm at the same time. (This is sometimes called the \"bird dog exercise. \") Hold for 6 seconds, and repeat 8 to 12 times on each side. Clamshell 1. Lie on your side with a pillow under your head. Keep your feet and knees together and your knees bent. 2. Raise your top knee, but keep your feet together. Do not let your hips roll back. Your legs should open up like a clamshell. 3. Hold for 6 seconds. 4. Slowly lower your knee back down. Rest for 10 seconds. 5. Repeat 8 to 12 times. 6. Switch to your other side and repeat steps 1 through 5. Hamstring wall stretch 1. Lie on your back in a doorway, with one leg through the open door. 2. Slide your affected leg up the wall to straighten your knee. You should feel a gentle stretch down the back of your leg. 1. Do not arch your back. 2. Do not bend either knee. 3. Keep one heel touching the floor and the other heel touching the wall. Do not point your toes. 3. Hold the stretch for at least 1 minute to begin. Then try to lengthen the time you hold the stretch to as long as 6 minutes. 4. Switch legs, and repeat steps 1 through 3. 
5. Repeat 2 to 4 times. 6. If you do not have a place to do this exercise in a doorway, there is another way to do it: 
7. Lie on your back, and bend one knee. 8. Loop a towel under the ball and toes of that foot, and hold the ends of the towel in your hands. 9. Straighten your knee, and slowly pull back on the towel. You should feel a gentle stretch down the back of your leg. 10. Switch legs, and repeat steps 1 through 3. 
11. Repeat 2 to 4 times. Lower abdominal strengthening 1. Lie on your back with your knees bent and your feet flat on the floor. 2. Tighten your belly muscles by pulling your belly button in toward your spine. 3. Lift one foot off the floor and bring your knee toward your chest, so that your knee is straight above your hip and your leg is bent like the letter \"L. \" 
 4. Lift the other knee up to the same position. 5. Lower one leg at a time to the starting position. 6. Keep alternating legs until you have lifted each leg 8 to 12 times. 7. Be sure to keep your belly muscles tight and your back still as you are moving your legs. Be sure to breathe normally. Piriformis stretch 1. Lie on your back with your legs straight. 2. Lift your affected leg, and bend your knee. With your opposite hand, reach across your body, and then gently pull your knee toward your opposite shoulder. 3. Hold the stretch for 15 to 30 seconds. 4. Switch legs and repeat steps 1 through 3. 
5. Repeat 2 to 4 times. Follow-up care is a key part of your treatment and safety. Be sure to make and go to all appointments, and call your doctor if you are having problems. It's also a good idea to know your test results and keep a list of the medicines you take. Where can you learn more? Go to http://andreia-liliane.info/. Enter P973 in the search box to learn more about \"Sacroiliac Pain: Exercises. \" Current as of: September 20, 2018 Content Version: 11.9 © 0853-1357 [employer]. Care instructions adapted under license by [employer] (which disclaims liability or warranty for this information). If you have questions about a medical condition or this instruction, always ask your healthcare professional. [place] [address] any warranty or liability for your use of this information. Arthritis: Care Instructions Your Care Instructions Arthritis, also called osteoarthritis, is a breakdown of the cartilage that cushions your joints. When the cartilage wears down, your bones rub against each other. This causes pain and stiffness. Many people have some arthritis as they age. Arthritis most often affects the joints of the spine, hands, hips, knees, or feet.  
You can take simple measures to protect your joints, ease your pain, and help you stay active. Follow-up care is a key part of your treatment and safety. Be sure to make and go to all appointments, and call your doctor if you are having problems. It's also a good idea to know your test results and keep a list of the medicines you take. How can you care for yourself at home? · Stay at a healthy weight. Being overweight puts extra strain on your joints. · Talk to your doctor or physical therapist about exercises that will help ease joint pain. ? Stretch. You may enjoy gentle forms of yoga to help keep your joints and muscles flexible. ? Walk instead of jog. Other types of exercise that are less stressful on the joints include riding a bicycle, swimming, jeanna chi, or water exercise. ? Lift weights. Strong muscles help reduce stress on your joints. Stronger thigh muscles, for example, take some of the stress off of the knees and hips. Learn the right way to lift weights so you do not make joint pain worse. · Take your medicines exactly as prescribed. Call your doctor if you think you are having a problem with your medicine. · Take pain medicines exactly as directed. ? If the doctor gave you a prescription medicine for pain, take it as prescribed. ? If you are not taking a prescription pain medicine, ask your doctor if you can take an over-the-counter medicine. · Use a cane, crutch, walker, or another device if you need help to get around. These can help rest your joints. You also can use other things to make life easier, such as a higher toilet seat and padded handles on kitchen utensils. · Do not sit in low chairs, which can make it hard to get up. · Put heat or cold on your sore joints as needed. Use whichever helps you most. You also can take turns with hot and cold packs. ? Apply heat 2 or 3 times a day for 20 to 30 minutesusing a heating pad, hot shower, or hot packto relieve pain and stiffness.  
? Put ice or a cold pack on your sore joint for 10 to 20 minutes at a time. Put a thin cloth between the ice and your skin. When should you call for help? Call your doctor now or seek immediate medical care if: 
  · You have sudden swelling, warmth, or pain in any joint.  
  · You have joint pain and a fever or rash.  
  · You have such bad pain that you cannot use a joint.  
 Watch closely for changes in your health, and be sure to contact your doctor if: 
  · You have mild joint symptoms that continue even with more than 6 weeks of care at home.  
  · You have stomach pain or other problems with your medicine. Where can you learn more? Go to http://andreia-liliane.info/. Enter U462 in the search box to learn more about \"Arthritis: Care Instructions. \" Current as of: Trudi 10, 2018 Content Version: 11.9 © 0157-5469 [employer]. Care instructions adapted under license by [employer] (which disclaims liability or warranty for this information). If you have questions about a medical condition or this instruction, always ask your healthcare professional. [place] [address] any warranty or liability for your use of this information.

## 2019-06-13 NOTE — PROGRESS NOTES
[address]  [address], [address]  [place]  Phone: [phone]  Fax: [phone]        Starr Mann  : 1965  PCP: Carlos Covington MD      NEW PATIENT      ASSESSMENT AND PLAN     Diagnoses and all orders for this visit:    1. Spinal stenosis of lumbar region without neurogenic claudication    2. Sacroiliac joint dysfunction of both sides    3. Psoriatic arthritis ([address]    Other orders  -     gabapentin (NEURONTIN) 600 mg tablet; Take 1 Tab by mouth three (3) times daily. 1. Advised to stay active as tolerated. 2. F/u with rheumatology. Emphasized importance of regular appointments-risk of joint destruction, SI joint erosions. 3. Would avoid sx as pain is primarily axial.  May benefit  From RF of SIJs if ongoing pain after DMARDS. 4. Increase Gabapentin to 600mg TID by taper  5. Discussed that we are not a pain management facility, we are unable to fill on-going pain medications. Recommend talking with rheumatology about PM.  6. Given information on lumbar stenosis, arthritis, SI joint pain and exercises    F/U PRN      CHIEF COMPLAINT  Becky Davis is seen today in consultation at the request of Dr. Raiza Campbell for complaints of back, buttock and thigh pain. HISTORY OF PRESENT ILLNESS  Becky Davis is a 48 y.o. male. Today pt c/o back, buttock and thigh pain of 4 year duration. Pt denies any specific incident or injury that caused their pain. Pt reports    Pt has discussed surgical options with Fam Martell, is here for second opinion. In PM, has received numerous injections w/minimal benefit. He states he has morning stiffness and pain throughout the day especially with sitting. He notes relief only with laying down and sleeping. Pt states he was dxed with psoriatic arthritis this month and was rxed MTX.  He states he is unable to take it due to rash and sun sensitivity and will tell his rheumatologist shortly. He denies fam hx of psoriatic arthritis, but notes his half-brother has RA. Pt notes he is shaky due to pain. Pt states his PM is about to cut him loose, so he is investigating other PM practices. Pt denies trying higher doses of Gabapentin, nor Lyrica, Cymbalta etc. Pt denies somnolence with any medications. Pt reports bariatric surgery upcoming in 11/2019. MRI images reviewed w/pt. Location of pain: low back > neck, upper back  Does pain radiate into extremities: B/L buttock and thigh. R hand constant numbness  Does patient have weakness: no   Pt denies saddle paresthesias. Medications pt is on: Percocet 7.5-325mg PRN with relief for 1 hour through PM. Ibuprofen 800mg PRN. Gabapentin 300mg TID, no benefit. Trazodone 100mg QHS. Flexeril 10mg PRN. Denies persistent fevers, chills, weight changes, neurogenic bowel or bladder symptoms. Treatments patient has tried:  Physical therapy:Yes low back 2018  Doing HEP: Unknown  Non-opioid medications: Yes Failed Gabapentin  Spinal injections: Yes STONE 2019 less than 1 day benefit  Spinal surgery- No.   Last L MRI 2019: multilevel degenerative changes with stenosis at L4-5     reviewed. Pt in PM with Dr. Clint Bautista at [facility]. PMHx of DM, reflux, kidney stones, psoriatic arthritis, L hip replacement age 54. Pt sees rheumatology, Dr. Charlie Castillo. 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 Pt applied for and was denied disability once before. ED 1/19/19 for angina pectoris, chest pain.      Pain Assessment  6/13/2019   Location of Pain Back   Severity of Pain 8   Quality of Pain Aching   Frequency of Pain Constant   Aggravating Factors Bending;Walking   Aggravating Factors Comment sitting, cough/sneezing, lifting   Relieving Factors (No Data)   Relieving Factors Comment lying down and changing positions             MRI lumbar spine 4/18/2019  Result Impression     Multilevel degenerative disc disease with multifactorial borderline stenosis L3/L4 and moderately severe spinal stenosis L4/L5. Modic type II degenerative changes around the L1/L2 level and L5/S1 level on the right compatible with active degeneration and inflammation. Multilevel foraminal narrowing most marked bilaterally L5/S1. Multiple levels facet arthropathy as above most marked L5/S1. 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 Radha Coto, rheum    Renal calculus, right     Snores     Swallowing difficulty     Ureteral calculi     Urine leukocytes        Past Surgical History:   Procedure Laterality Date    HX CHOLECYSTECTOMY      2002    HX COLECTOMY  2017    HX COLONOSCOPY  02.22.2016    HX UROLOGICAL  06/06/2016    [facility]-Cystoscopy, CYSTOURETHROSCOPY W/ INDWELLING URETERAL STENT INSERTION, Dr Mikala Hope HX UROLOGICAL  05/29/2016    [facility]- Cystoscopy, Right retrograde pyelography, Right 6 x 26 cm double-J ureteral stent placement,Intraoperative fluoro less than 1 hour,Interpretation of Urography, Dr Williams Fu PARTIAL HIP REPLACEMENT Left        MEDICATIONS      Current Outpatient Medications   Medication Sig Dispense Refill    gabapentin (NEURONTIN) 600 mg tablet Take 1 Tab by mouth three (3) times daily. 90 Tab 2    traZODone (DESYREL) 100 mg tablet Take 1 Tab by mouth nightly. 90 Tab 1    hydrOXYzine HCl (ATARAX) 50 mg tablet Take 1 Tab by mouth every six (6) hours as needed for Anxiety. 180 Tab 1    cyclobenzaprine (FLEXERIL) 10 mg tablet Take 1 Tab by mouth three (3) times daily as needed for Muscle Spasm(s).  Indications: muscle spasm 30 Tab 1    omeprazole (PRILOSEC OTC) 20 mg tablet Take 20 mg by mouth daily.  fluticasone propionate (FLONASE) 50 mcg/actuation nasal spray 2 Sprays by Both Nostrils route daily. 1 Bottle 3    lisinopril-hydroCHLOROthiazide (PRINZIDE, ZESTORETIC) 20-12.5 mg per tablet Take 2 Tabs by mouth daily. 180 Tab 1    pravastatin (PRAVACHOL) 40 mg tablet Take 1 Tab by mouth nightly. 90 Tab 1    metFORMIN (GLUCOPHAGE) 1,000 mg tablet Take 1 Tab by mouth two (2) times daily (with meals). 180 Tab 1    ibuprofen (MOTRIN) 800 mg tablet Take 1 Tab by mouth every six (6) hours as needed for Pain. 180 Tab 1    sildenafil citrate (VIAGRA) 100 mg tablet take 1 tablet 1 hour prior to intercourse 10 Tab 3    LORazepam (ATIVAN) 0.5 mg tablet Take 1 Tab by mouth every eight (8) hours as needed for Anxiety. Max Daily Amount: 1.5 mg. Indications: ANXIETY 30 Tab 1    VOLTAREN 1 % gel Apply  to affected area every six (6) hours. 4 g 1    oxyCODONE-acetaminophen (PERCOCET) 5-325 mg per tablet Take 1 Tab by mouth every eight (8) hours as needed for Pain. Max Daily Amount: 3 Tabs. 21 Tab 0       ALLERGIES    Allergies   Allergen Reactions    Medrol [Methylprednisolone] Other (comments)     tachycardia  Patient states he had a severe allergic reaction about 15 years ago.  Lipitor [Atorvastatin] Other (comments)     Muscle cramping    Hydrocodone-Acetaminophen Rash          SOCIAL HISTORY    Social History     Socioeconomic History    Marital status:      Spouse name: Not on file    Number of children: Not on file    Years of education: Not on file    Highest education level: Not on file   Occupational History    Not on file   Social Needs    Financial resource strain: Not on file    Food insecurity:     Worry: Not on file     Inability: Not on file    Transportation needs:     Medical: Not on file     Non-medical: Not on file   Tobacco Use    Smoking status: Never Smoker    Smokeless tobacco: Never Used   Substance and Sexual Activity    Alcohol use:  Yes Alcohol/week: 0.0 oz     Comment: occ    Drug use: No    Sexual activity: Yes     Partners: Female   Lifestyle    Physical activity:     Days per week: Not on file     Minutes per session: Not on file    Stress: Not on file   Relationships    Social connections:     Talks on phone: Not on file     Gets together: Not on file     Attends Scientologist service: Not on file     Active member of club or organization: Not on file     Attends meetings of clubs or organizations: Not on file     Relationship status: Not on file    Intimate partner violence:     Fear of current or ex partner: Not on file     Emotionally abused: Not on file     Physically abused: Not on file     Forced sexual activity: Not on file   Other Topics Concern    Not on file   Social History Narrative    Not on file       FAMILY HISTORY    Family History   Problem Relation Age of Onset    Cancer Mother         Lung    Hypertension Mother     Stroke Mother     Cancer Father         Brain    Heart Attack Father          REVIEW OF SYSTEMS  Review of Systems   Constitutional: Negative for chills, fever and weight loss. Respiratory: Negative for shortness of breath. Cardiovascular: Negative for chest pain. Gastrointestinal: Negative for constipation. Negative for fecal incontinence   Genitourinary: Negative for dysuria. Negative for urinary incontinence   Musculoskeletal:        Per HPI   Skin: Negative for rash. Neurological: Positive for tingling and tremors. Negative for dizziness, focal weakness and headaches. Endo/Heme/Allergies: Does not bruise/bleed easily. Psychiatric/Behavioral: The patient does not have insomnia. PHYSICAL EXAMINATION  Visit Vitals  /70   Pulse 82   Temp 98.1 °F (36.7 °C) (Oral)   Resp 17   Ht 5' 8\" (1.727 m)   Wt 297 lb 3.2 oz (134.8 kg)   SpO2 96%   BMI 45.19 kg/m²          Accompanied by self. Constitutional:  Well developed, well nourished, in no acute distress. Psychiatric: Affect and mood are appropriate. Integumentary: No rashes or abrasions noted on exposed areas. Cardiovascular/Peripheral Vascular: Intact l pulses. No peripheral edema is noted BLE. Lymphatic:  No evidence of lymphedema. No cervical lymphadenopathy. SPINE/MUSCULOSKELETAL EXAM    ThoracoLumbar spine:  No rash, ecchymosis, or gross obliquity. No fasciculations. No focal atrophy is noted. Tenderness to palpation upper thoracic midline to sacrum. Focal Tenderness to palpation of L4-5, B/L SI joints, L trochanteric bursa. No tenderness to palpation at the sciatic notch. MOTOR:       Hip Flex  Quads Hamstrings Ankle DF EHL Ankle PF   Right +4/5 +4/5 +4/5 +4/5 +4/5 +4/5   Left +4/5 +4/5 +4/5 +4/5 +4/5 +4/5       Straight Leg raise negative. Severe difficulty with tandem gait. Ambulation without assistive device. FWB. Written by Bridger Carter, as dictated by Jossie Infante MD.    I, Dr. Jossie Infante MD, confirm that all documentation is accurate. Mr. Madeline Glass may have a reminder for a \"due or due soon\" health maintenance. I have asked that he contact his primary care provider for follow-up on this health maintenance.

## 2019-06-13 NOTE — PROGRESS NOTES
Verbal order entered per Dr. Keily Shaffer as documented on blue sheet:Gabapentin 600mg take 1 tab po TID.  Disp 90 with 2 refills

## 2019-06-19 ENCOUNTER — DOCUMENTATION ONLY (OUTPATIENT)
Dept: SURGERY | Age: 54
End: 2019-06-19

## 2019-06-19 NOTE — PROGRESS NOTES
[employer] [facility] [address] [facility]  [address], [address]  [place], [place] [address]    Patient's Name: Stephania Boss   Age: 48 y.o. YOB: 1965   Sex: male    Date:   05/22/2019    Insurance:  [employer]            Session: 1 of 6   Surgeon:  Dr. Donald Vernon    Height: 5' 8\" \"   Weight:    297      Lbs. BMI: 45     Starting Weight: 303        Do you smoke? no    Alcohol intake:    I do not drink at all. Class Guidelines    Guidelines are reviewed with patient at the start of every class. 1. Patient understands that weight loss trial classes must be consecutive. Patient understands if they miss a class, it is their responsibility to contact me to reschedule class. I will reach out to patient after their first no show. 2.  Patient understands the expectations that weight maintenance/weight loss is expected during the classes. Failure to demonstrate changes may result in one extra month of weight loss trial, followed by going back to see the surgeon. Patient understands that they CAN NOT gain any weight during the weight loss trial.  Gaining weight will result in extra classes. 3. Patient is also instructed to be doing their labs, blood work, psych visit, support group and any other test that the surgeon has used while they are working on their weight loss trial.  4.  Patient was instructed to bring their blue binder to every class and appointment. Eating Habits and Behaviors      Today in class we talked about the key diet principles. We start off each class talking about these principles, which include cutting out liquid calories and focusing on water or other non-calorie, non-carbonated drinks. We also spent time talking about carbohydrates, including foods that have carbohydrates and the goal to keep daily carbohydrates under 100 grams per day.   Patient was given ideas of meal and snack choices that are lower in carbohydrates and focus more on protein. Patient was encouraged to start trying protein shakes and was given a list of suggestions. The main topic of class today was: Portion Control. We reviewed in class a power point filled with tips on ways to control portions, including using smaller plates, boxing up portions at a restaurant before starting to eat, and not eating from the container, but rather portioning snacks into smaller bags. Patient's were encouraged to food journal, which helps increase awareness of what and how much they are eating. It was emphasized to patient the importance of reading labels and portion sizes, but also applying these portion sizes. Patient was given a list of items that can help to make portion control easier. For example, a deck of cards or a palm of a hand is a proper portion of meat, a fist is a cup or a proper serving of vegetables. Patient was given 10 tips to help with the portion control. Patient's current diet habits include:   Breakfast:Biscuit, sandwich  Lunch:skip  Dinner:grilled chicken. Physical Activity/Exercise    Comments:  Patient was given a list of ideas for activity and was encouraged to incorporate 30 minutes a day into their daily routine. Behavior Modification       Comments: In class, we also focused on the behavior aspects of weight management. This includes being a mindful eater and not eating in front of the TV. Patient is also encouraged to take 20 minutes to eat a meal and eat at a table. One goal for next month includes:  1. Stop all candy by next month.       Carlos Alberto Presley, LEFTY  05/22/2019

## 2019-06-23 ENCOUNTER — DOCUMENTATION ONLY (OUTPATIENT)
Dept: SURGERY | Age: 54
End: 2019-06-23

## 2019-06-24 PROBLEM — F41.8 INSOMNIA SECONDARY TO DEPRESSION WITH ANXIETY: Status: ACTIVE | Noted: 2019-06-24

## 2019-06-24 PROBLEM — F51.05 INSOMNIA SECONDARY TO DEPRESSION WITH ANXIETY: Status: ACTIVE | Noted: 2019-06-24

## 2019-06-24 NOTE — PROGRESS NOTES
[employer] [facility]  [address] [address], [facility]    Patient's Name: Stephania Boss   Age: 48 y.o. YOB: 1965   Sex: male    Date: 06/10/2019    Insurance:  [employer]          Session: 2 of  6  Surgeon:  Dr. Donald Vernon     Height: 5'8\" Weight:    300      Lbs. BMI: 45     Starting Weight: 303       Do you smoke? no    Alcohol intake:    I drink occasionally:    Number of drinks at a time:  1  Number of times a month. Class Guidelines    Guidelines are reviewed with patient at the start of every class. 1. Patient understands that weight loss trial classes must be consecutive. Patient understands if they miss a class, it is their responsibility to contact me to reschedule class. I will reach out to patient after their first no show. 2.  Patient understands the expectations that weight maintenance/weight loss is expected during the classes. Failure to demonstrate changes may result in one extra month of weight loss trial, followed by going back to see the surgeon. Patient understands that they CAN NOT gain any weight during the weight loss trial.  Gaining weight will result in extra classes. 3. Patient is also instructed to be doing their labs, blood work, psych visit, support group and any other test that the surgeon has used while they are working on their weight loss trial.  4.  Patient was instructed to bring their blue binder to every class and appointment. Changes Made Since Last Class:   Stopped candy. Eating Habits and Behaviors    Today in class, I reviewed a hand out with meal ideas for better planning. We also discussed Nutrition, Behavior, and Exercise changes to start working on. Some of the eating behaviors that we discussed included the importance of eating breakfast and a list of sample breakfast foods was provided. We also talked about avoiding liquid calories.   Patient is encouraged to aim for 64 ounces of fluid per day and trying to get them from water, tea bags and water infuser tools. Patient's current diet habits include:   Breakfast:  Fruit yogurt  Lunch:  Protein shake  Dinner:  Baked chicken        Physical Activity/Exercise    Comments: We talked about exercise. Patient was given reasons of why exercise is so important and how that can help with their long-term success. I have encouraged patient to get a support system to help with the activity. Behavior Modification       Comments: We also talked about behavior modifications. We talked about eating triggers, such as eating in front of the TV and solutions, such as making the TV a no eating zone. If patient is eating out out of emotion, food will only temporarily solve that. Patient is encouraged to HALT and assess if they are eating because they are Hungry, or out of emotions: Anxious, Lonely, Tired, which the food will only temporarily solve. We also talked about ways to prevent relapse. Goals that patient wants to work on includes:  1. Eat smaller portions. 2. Walk more.       Margarita Harding RD  06/10/2019

## 2019-07-19 DIAGNOSIS — M62.838 MUSCLE SPASM: ICD-10-CM

## 2019-07-19 DIAGNOSIS — M54.12 CERVICAL RADICULOPATHY: ICD-10-CM

## 2019-07-22 RX ORDER — IBUPROFEN 800 MG/1
800 TABLET ORAL
Qty: 180 TAB | Refills: 1 | Status: SHIPPED | OUTPATIENT
Start: 2019-07-22 | End: 2019-11-25

## 2019-07-22 RX ORDER — CYCLOBENZAPRINE HCL 10 MG
10 TABLET ORAL
Qty: 30 TAB | Refills: 1 | Status: SHIPPED | OUTPATIENT
Start: 2019-07-22 | End: 2020-01-07 | Stop reason: SDUPTHER

## 2019-07-29 ENCOUNTER — DOCUMENTATION ONLY (OUTPATIENT)
Dept: SURGERY | Age: 54
End: 2019-07-29

## 2019-07-29 DIAGNOSIS — E11.9 TYPE 2 DIABETES MELLITUS WITHOUT COMPLICATION, WITHOUT LONG-TERM CURRENT USE OF INSULIN (HCC): ICD-10-CM

## 2019-07-29 NOTE — PROGRESS NOTES
[facility] [address]  [address], [address]  [place], [place]    Patient's Name: Lynda Key   Age: 48 y.o. YOB: 1965   Sex: male    Date:   07/15/2019    Insurance:  [employer]            Session: 3 of 6   Surgeon:  Dr. Maxine Aguillon    Height: 5'8\"   Weight:    290      Lbs. BMI: 45   Starting Weight: 303       Do you smoke? no    Alcohol intake:    I don't drink at all. Class Guidelines    Guidelines are reviewed with patient at the start of every class. 1. Patient understands that weight loss trial classes must be consecutive. Patient understands if they miss a class, it is their responsibility to contact me to reschedule class. I will reach out to patient after their first no show. 2.  Patient understands the expectations that weight maintenance/weight loss is expected during the classes. Failure to demonstrate changes may result in one extra month of weight loss trial, followed by going back to see the surgeon. Patient understands that they CAN NOT gain any weight during the weight loss trial.  Gaining weight will result in extra classes. 3. Patient is also instructed to be doing their labs, blood work, psych visit, support group and any other test that the surgeon has used while they are working on their weight loss trial.  4.  Patient was instructed to bring their blue binder to every class and appointment. Changes Made Since Last Class:   Drinking more water. Eating Habits and Behaviors    Today in class, we started talking about the key diet principles. We first focused on stopping liquid calories. Patient was also educated on carbohydrates. Patient was instructed to start cutting out bread, rice, and pasta from the diet and start focusing more on meat and vegetables. Patient's current diet habits include:   Breakfast:  Eggs, mast, fruit. Lunch: Premier protein.   Dinner: chicken, fish , vegetables. Physical Activity/Exercise    Comments: We talked about exercise. Patient was given reasons of why exercise is so important and how that can help with their long-term success. I have encouraged patient to get a support system to help with the activity. To emphasize the importance patients were provided a handout on all the benefits of exercise. Patient were also provided a handout on overcoming barriers to exercise which included and certificate of commitment for them to use as well as a form to track their exercise. All these handouts were discussed in detail. Behavior Modification       Comments:  Behavior modifications were reinforced. This included not eating in front of the TV, which could lead to bigger portions and eating when one is not hungry. We also talked about the importance of eating 3 meals per day. Patient was encouraged to food journal to keep their daily carbohydrates less than 30 grams per meal.      Goals that patient wants to work on includes:  1. Want to lower carb intake. 2. Walk more.       Janel Spivey RD

## 2019-08-01 RX ORDER — METFORMIN HYDROCHLORIDE 1000 MG/1
TABLET ORAL
Qty: 60 TAB | Refills: 0 | Status: SHIPPED | OUTPATIENT
Start: 2019-08-01 | End: 2019-11-07 | Stop reason: SDUPTHER

## 2019-08-23 ENCOUNTER — HOSPITAL ENCOUNTER (OUTPATIENT)
Dept: LAB | Age: 54
Discharge: HOME | End: 2019-08-23
Payer: MEDICAID

## 2019-08-23 ENCOUNTER — HOSPITAL ENCOUNTER (OUTPATIENT)
Dept: LAB | Age: 54
Discharge: HOME | End: 2019-08-23

## 2019-08-23 ENCOUNTER — OFFICE VISIT (OUTPATIENT)
Dept: SURGERY | Age: 54
End: 2019-08-23

## 2019-08-23 VITALS
DIASTOLIC BLOOD PRESSURE: 79 MMHG | HEART RATE: 73 BPM | TEMPERATURE: 96.6 F | HEIGHT: 68 IN | WEIGHT: 287.8 LBS | BODY MASS INDEX: 43.62 KG/M2 | OXYGEN SATURATION: 97 % | SYSTOLIC BLOOD PRESSURE: 124 MMHG

## 2019-08-23 DIAGNOSIS — E66.01 MORBID OBESITY (HCC): Primary | ICD-10-CM

## 2019-08-23 DIAGNOSIS — E66.01 MORBID OBESITY (HCC): ICD-10-CM

## 2019-08-23 DIAGNOSIS — E11.9 CONTROLLED TYPE 2 DIABETES MELLITUS WITHOUT COMPLICATION, WITHOUT LONG-TERM CURRENT USE OF INSULIN (HCC): ICD-10-CM

## 2019-08-23 DIAGNOSIS — R10.32 LEFT LOWER QUADRANT PAIN: ICD-10-CM

## 2019-08-23 DIAGNOSIS — I10 ESSENTIAL HYPERTENSION: ICD-10-CM

## 2019-08-23 DIAGNOSIS — E78.5 HYPERLIPIDEMIA, UNSPECIFIED HYPERLIPIDEMIA TYPE: ICD-10-CM

## 2019-08-23 LAB
ATRIAL RATE: 68 BPM
CALCULATED P AXIS, ECG09: 18 DEGREES
CALCULATED R AXIS, ECG10: 4 DEGREES
CALCULATED T AXIS, ECG11: 51 DEGREES
DIAGNOSIS, 93000: NORMAL
P-R INTERVAL, ECG05: 156 MS
Q-T INTERVAL, ECG07: 370 MS
QRS DURATION, ECG06: 100 MS
QTC CALCULATION (BEZET), ECG08: 393 MS
SENTARA SPECIMEN COL,SENBCF: NORMAL
VENTRICULAR RATE, ECG03: 68 BPM

## 2019-08-23 PROCEDURE — 99001 SPECIMEN HANDLING PT-LAB: CPT

## 2019-08-23 PROCEDURE — 93005 ELECTROCARDIOGRAM TRACING: CPT

## 2019-08-23 RX ORDER — OXYCODONE AND ACETAMINOPHEN 7.5; 325 MG/1; MG/1
1 TABLET ORAL 3 TIMES DAILY
COMMUNITY
End: 2019-11-27

## 2019-08-23 RX ORDER — HYDROCHLOROTHIAZIDE 12.5 MG/1
12.5 TABLET ORAL DAILY
COMMUNITY
End: 2019-08-23

## 2019-08-23 RX ORDER — LISINOPRIL 40 MG/1
40 TABLET ORAL DAILY
COMMUNITY
End: 2019-08-23

## 2019-08-23 NOTE — PROGRESS NOTES
Bariatric Preoperative Progress Note    Subjective:     Cam Dexter is a 48 y.o. male who presents today for followup of their candidacy for bariatric surgery. Since last seen, Cam Dexter has been working through bariatric program towards gastric bypass. Pt c/o increased LLQ pain s/p colectomy. Past Medical History:   Diagnosis Date    Adverse effect of anesthesia     brother- BP drops after surgery-difficult to wake    Back pain     Chest pain     Diabetes (HCC)     Diverticulitis     Esophageal reflux     GERD (gastroesophageal reflux disease)     Hypertension     Kidney stones     Osteoarthritis     Pneumonia     Psoriatic arthritis, 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[facility] [address]     dx 5/2019, Dr. Servando Harper, rheum    Renal calculus, right     Snores     Swallowing difficulty     Ureteral calculi     Urine leukocytes        Past Surgical History:   Procedure Laterality Date    HX CHOLECYSTECTOMY      2002    HX COLECTOMY  2017    HX COLONOSCOPY  02.22.2016    HX UROLOGICAL  06/06/2016    [facility]-Cystoscopy, CYSTOURETHROSCOPY W/ INDWELLING URETERAL STENT INSERTION, Dr Davina Dandy HX UROLOGICAL  05/29/2016    [facility]- Cystoscopy, Right retrograde pyelography, Right 6 x 26 cm double-J ureteral stent placement,Intraoperative fluoro less than 1 hour,Interpretation of Urography, Dr Noah Holm Left        Current Outpatient Medications   Medication Sig Dispense Refill    oxyCODONE-acetaminophen (PERCOCET) 7.5-325 mg per tablet Take  by mouth.  pravastatin (PRAVACHOL) 40 mg tablet TAKE 1 TABLET BY MOUTH ONCE DAILY AT BEDTIME FOR CHOLESTEROL 90 Tab 0    metFORMIN (GLUCOPHAGE) 1,000 mg tablet TAKE ONE TABLET BY MOUTH TWICE A DAY WITH MEALS  60 Tab 0    ibuprofen (MOTRIN) 800 mg tablet Take 1 Tab by mouth every six (6) hours as needed for Pain. 180 Tab 1    cyclobenzaprine (FLEXERIL) 10 mg tablet Take 1 Tab by mouth three (3) times daily as needed for Muscle Spasm(s). Indications: muscle spasm 30 Tab 1    gabapentin (NEURONTIN) 600 mg tablet Take 1 Tab by mouth three (3) times daily. 90 Tab 2    traZODone (DESYREL) 100 mg tablet Take 1 Tab by mouth nightly. 90 Tab 1    hydrOXYzine HCl (ATARAX) 50 mg tablet Take 1 Tab by mouth every six (6) hours as needed for Anxiety. 180 Tab 1    omeprazole (PRILOSEC OTC) 20 mg tablet Take 20 mg by mouth daily.  fluticasone propionate (FLONASE) 50 mcg/actuation nasal spray 2 Sprays by Both Nostrils route daily. 1 Bottle 3    sildenafil citrate (VIAGRA) 100 mg tablet take 1 tablet 1 hour prior to intercourse 10 Tab 3    VOLTAREN 1 % gel Apply  to affected area every six (6) hours. 4 g 1    lisinopril-hydroCHLOROthiazide (PRINZIDE, ZESTORETIC) 20-12.5 mg per tablet Take 2 Tabs by mouth daily. 180 Tab 1    oxyCODONE-acetaminophen (PERCOCET) 5-325 mg per tablet Take 1 Tab by mouth every eight (8) hours as needed for Pain. Max Daily Amount: 3 Tabs. 21 Tab 0       Allergies   Allergen Reactions    Medrol [Methylprednisolone] Other (comments)     tachycardia  Patient states he had a severe allergic reaction about 15 years ago.      Lipitor [Atorvastatin] Other (comments)     Muscle cramping    Hydrocodone-Acetaminophen Rash       ROS:  Review of Systems:  Positive in BOLD     CONST: Fever, weight loss, fatigue or chills  GI: Nausea, vomiting, abdominal pain, change in bowel habits, hematochezia, melena, and GERD   INTEG: Dermatitis, abnormal moles  HEENT: Recent changes in vision, vertigo, epistaxis, dysphagia and hoarseness  CV: Chest pain, palpitations, HTN, edema and varicosities  RESP: Cough, shortness of breath, wheezing, hemoptysis, snoring and reactive airway disease  : Hematuria, dysuria, frequency, urgency, nocturia and stress urinary incontinence   MS: Weakness, joint pain and arthritis - ankle, knee  ENDO: Diabetes, thyroid disease, polyuria, polydipsia, polyphagia, poor wound healing, heat intolerance, cold intolerance  LYMPH/HEME: Anemia, bruising and history of blood transfusions  NEURO: Dizziness, headache, fainting, seizures and stroke  PSYCH: Anxiety and depression    Remainder of ROS as per HPI. Objective:     Physical Exam:  Visit Vitals  /79 (BP Patient Position: Sitting)   Pulse 73   Temp 96.6 °F (35.9 °C) (Oral)   Ht 5' 8\" (1.727 m)   Wt 130.5 kg (287 lb 12.8 oz)   SpO2 97%   BMI 43.76 kg/m²         General: 48 y.o. male in no acute distress. Morbidly obese in abdomen - android pattern  HEENT: Normocephalic, atraumatic, Pupils equal and reactive, nasopharynx clear, oropharynx clear and moist without lesions  NECK: Supple, no lymphadenopathy, thyromegaly, carotid bruits or jugular venous distension. trachea midline  RESP: Clear to auscultation bilaterally, no wheezes, rhonchi, or rales, normal respiratory excursion  CV: Regular rate and rhythm, no murmurs, rubs or gallops. 3+/4 pulses in bilateral dorsalis pedis and posterior tibialis. No distal edema or varicosities. ABD: Soft, nontender, nondistended, normoactive bowel sounds, small umbilical hernia, no hepatosplenomegaly, easily palpable costal margins, android distribution, healed LLQ and lap scars  Extremities: Warm, well perfused, no tenderness or swelling, normal gait/station  Neuro: Sensation and strength grossly intact and symmetrical  Psych: Alert and oriented to person, place, and time. Studies to date:    Labs: Pending. Plans on obtaining today. EKG: Pending. Plans on obtaining today. Nutritional evaluation: 3/6 [facility]T Diana Perry, 7/29/19    Psychiatric evaluation:cleared. Support Group: Attended July 2019    Additional evaluations: PCP clearance pending. 10/2019    Given pt's history of colectomy and recent increasing LLQ pain will obtain a CT scan abd with PO and IV contrast.     Hope for surgery end of October Beginning of November.      Assessment:   Cam Dexter is a 48 y.o. male who is progressing through the bariatric preoperative evaluation. At this time, they are not yet an appropriate candidate for weight loss surgery. Mr. Ed Nance has a reminder for a \"due or due soon\" health maintenance. I have asked that he contact his primary care provider for follow-up on this health maintenance. Plan:   -complete remainder of preop evaluation including those outlined above. -Patient voices understanding that weight gain during weight loss trial may result in cancellation of weight loss surgery.   -Follow up once has completed entirety of weight loss workup to determine next steps.       Estefania Cash, MS, PA-C

## 2019-08-23 NOTE — PATIENT INSTRUCTIONS
If you have any questions or concerns about today's appointment, the verbal and/or written instructions you were given for follow up care, please call our office at [phone].     [facility] - Светлана Siu [address] [place] [address]    [phone] office  [phone]

## 2019-08-23 NOTE — PROGRESS NOTES
Hipolito Em is a 48 y.o. male (: 1965) presenting to address:    Chief Complaint   Patient presents with    Morbid Obesity     midtrial        Medication list and allergies have been reviewed with Hipolito Em and updated as of today's date. I have gone over all Medical, Surgical and Social History with Hipolito Em and updated/added the information accordingly. 1. Have you been to the ER, urgent care clinic since your last visit? Hospitalized since your last visit? No     2. Have you seen or consulted any other health care providers outside of the [address] [place] [address] since your last visit? Include any pap smears or colon screening. No      Visit Vitals  /79 (BP Patient Position: Sitting)   Pulse 73   Temp 96.6 °F (35.9 °C) (Oral)   Ht 5' 8\" (1.727 m)   Wt 130.5 kg (287 lb 12.8 oz)   SpO2 97%   BMI 43.76 kg/m²       Current Outpatient Medications:     oxyCODONE-acetaminophen (PERCOCET) 7.5-325 mg per tablet, Take  by mouth., Disp: , Rfl:     pravastatin (PRAVACHOL) 40 mg tablet, TAKE 1 TABLET BY MOUTH ONCE DAILY AT BEDTIME FOR CHOLESTEROL, Disp: 90 Tab, Rfl: 0    metFORMIN (GLUCOPHAGE) 1,000 mg tablet, TAKE ONE TABLET BY MOUTH TWICE A DAY WITH MEALS , Disp: 60 Tab, Rfl: 0    ibuprofen (MOTRIN) 800 mg tablet, Take 1 Tab by mouth every six (6) hours as needed for Pain., Disp: 180 Tab, Rfl: 1    cyclobenzaprine (FLEXERIL) 10 mg tablet, Take 1 Tab by mouth three (3) times daily as needed for Muscle Spasm(s). Indications: muscle spasm, Disp: 30 Tab, Rfl: 1    gabapentin (NEURONTIN) 600 mg tablet, Take 1 Tab by mouth three (3) times daily. , Disp: 90 Tab, Rfl: 2    traZODone (DESYREL) 100 mg tablet, Take 1 Tab by mouth nightly., Disp: 90 Tab, Rfl: 1    hydrOXYzine HCl (ATARAX) 50 mg tablet, Take 1 Tab by mouth every six (6) hours as needed for Anxiety. , Disp: 180 Tab, Rfl: 1    omeprazole (PRILOSEC OTC) 20 mg tablet, Take 20 mg by mouth daily. , Disp: , Rfl:     fluticasone propionate (FLONASE) 50 mcg/actuation nasal spray, 2 Sprays by Both Nostrils route daily. , Disp: 1 Bottle, Rfl: 3    sildenafil citrate (VIAGRA) 100 mg tablet, take 1 tablet 1 hour prior to intercourse, Disp: 10 Tab, Rfl: 3    VOLTAREN 1 % gel, Apply  to affected area every six (6) hours. , Disp: 4 g, Rfl: 1    lisinopril-hydroCHLOROthiazide (PRINZIDE, ZESTORETIC) 20-12.5 mg per tablet, Take 2 Tabs by mouth daily. , Disp: 180 Tab, Rfl: 1    oxyCODONE-acetaminophen (PERCOCET) 5-325 mg per tablet, Take 1 Tab by mouth every eight (8) hours as needed for Pain. Max Daily Amount: 3 Tabs., Disp: 21 Tab, Rfl: 0    Baldomero Fleming had no medications administered during this visit.   .  Last 3 Recorded Weights in this Encounter    08/23/19 1011   Weight: 130.5 kg (287 lb 12.8 oz)     Pre op weight: 287.8  EBW: 136.8  Wt loss to date: 0

## 2019-08-24 LAB
25(OH)D3 SERPL-MCNC: 21.5 NG/ML (ref 32–100)
ABSOLUTE LYMPHOCYTE COUNT, 10803: 1 K/UL (ref 1–4.8)
ALBUMIN SERPL-MCNC: 4.4 G/DL (ref 3.5–5)
ANION GAP SERPL CALC-SCNC: 13 MMOL/L
BASOPHILS # BLD: 0.1 K/UL (ref 0–0.2)
BASOPHILS NFR BLD: 1 % (ref 0–2)
BUN SERPL-MCNC: 28 MG/DL (ref 6–22)
CALCIUM SERPL-MCNC: 9.5 MG/DL (ref 8.4–10.4)
CHLORIDE SERPL-SCNC: 104 MMOL/L (ref 98–110)
CHOLEST SERPL-MCNC: 181 MG/DL (ref 110–200)
CO2 SERPL-SCNC: 23 MMOL/L (ref 20–32)
CREAT SERPL-MCNC: 1 MG/DL (ref 0.5–1.2)
EOSINOPHIL # BLD: 0.2 K/UL (ref 0–0.5)
EOSINOPHIL NFR BLD: 4 % (ref 0–6)
ERYTHROCYTE [DISTWIDTH] IN BLOOD BY AUTOMATED COUNT: 12.3 % (ref 10–15.5)
FERRITIN SERPL-MCNC: 112 NG/ML (ref 22–322)
FOLATE,FOL: 11.01 NG/ML
GFRAA, 66117: >60
GFRNA, 66118: >60
GLUCOSE SERPL-MCNC: 117 MG/DL (ref 70–99)
GRANULOCYTES,GRANS: 70 % (ref 40–75)
HCT VFR BLD AUTO: 38.8 % (ref 39.3–51.6)
HDLC SERPL-MCNC: 33 MG/DL (ref 40–59)
HDLC SERPL-MCNC: 5.5 MG/DL (ref 0–5)
HGB BLD-MCNC: 13.1 G/DL (ref 13.1–17.2)
IRON,IRN: 79 MCG/DL (ref 45–160)
LDL, DIRECT,DLDL: 88 MG/DL (ref 50–99)
LDLC SERPL CALC-MCNC: ABNORMAL MG/DL (ref 50–99)
LYMPHOCYTES, LYMLT: 17 % (ref 20–45)
MCH RBC QN AUTO: 30 PG (ref 26–34)
MCHC RBC AUTO-ENTMCNC: 34 G/DL (ref 31–36)
MCV RBC AUTO: 88 FL (ref 80–95)
MONOCYTES # BLD: 0.4 K/UL (ref 0.1–1)
MONOCYTES NFR BLD: 7 % (ref 3–12)
NEUTROPHILS # BLD AUTO: 4.2 K/UL (ref 1.8–7.7)
PLATELET # BLD AUTO: 219 K/UL (ref 140–440)
PMV BLD AUTO: 11.5 FL (ref 9–13)
POTASSIUM SERPL-SCNC: 4.7 MMOL/L (ref 3.5–5.5)
RBC # BLD AUTO: 4.43 M/UL (ref 3.8–5.8)
SODIUM SERPL-SCNC: 140 MMOL/L (ref 133–145)
TRIGL SERPL-MCNC: 463 MG/DL (ref 40–149)
TSH SERPL DL<=0.005 MIU/L-ACNC: 0.88 MCU/ML (ref 0.27–4.2)
VIT B12 SERPL-MCNC: 480 PG/ML (ref 211–911)
VLDLC SERPL CALC-MCNC: 93 MG/DL (ref 8–30)
WBC # BLD AUTO: 6 K/UL (ref 4–11)

## 2019-08-27 LAB — VITAMIN B1, WHOLE BLOOD, 66250: 102.4 NMOL/L (ref 66.5–200)

## 2019-08-29 ENCOUNTER — DOCUMENTATION ONLY (OUTPATIENT)
Dept: SURGERY | Age: 54
End: 2019-08-29

## 2019-08-29 NOTE — PROGRESS NOTES
[facility] [address] [facility] [address]  [place], [address]    Patient's Name: Neha Brush   Age: 48 y.o. YOB: 1965   Sex: male    Date:  08/12/2019    Session: 4 of  6  Surgeon:  Dr. Blas Rushing     Height: 5'8\" Weight:    284      Lbs. BMI: 43     Starting Weight: 303       Do you smoke? no    Alcohol intake:    I do not drink at all. Class Guidelines    Guidelines are reviewed with patient at the start of every class. 1. Patient understands that weight loss trial classes must be consecutive. Patient understands if they miss a class, it is their responsibility to contact me to reschedule class. I will reach out to patient after their first no show. 2.  Patient understands the expectations that weight maintenance/weight loss is expected during the classes. Failure to demonstrate changes may result in one extra month of weight loss trial, followed by going back to see the surgeon. Patient understands that they CAN NOT gain any weight during the weight loss trial.  Gaining weight will result in extra classes. 3. Patient is also instructed to be doing their labs, blood work, psych visit, support group and any other test that the surgeon has used while they are working on their weight loss trial.  4.  Patient was instructed to bring their blue binder to every class and appointment. Changes Made Since Last Class:   Losing weight. Eating Habits and Behaviors    Today in class, we reviewed the key diet principles. I have talked to patient about pushing the fluid and working towards 64 ounces per day. We focused on following a low-calorie diet. Patient was instructed to count their carbohydrates and try to keep their daily intake under 100 grams per day and try to keep their daily protein at 80 grams per day. Patient was given examples of carbohydrates in starches.   Patient was encouraged to focus on meat and vegetables and begin cutting carbohydrates out. We talked about foods that are protein-based and how to incorporate those into their meals. I also reviewed with patient the importance of eating 3 meals per day and suggestions were made for breakfast items. Patient's current diet habits include:   Breakfast:  2 eggs and yogurt. Lunch:  protein drink. Dinner:  Chicken, fish, veggies. Physical Activity/Exercise    Comments: We talked about exercise. Patient was given reasons of why exercise is so important and how that can help with their long-term success. I have encouraged patient to get a support system to help with the activity. Currently for activity, patient is doing more walking. .      Behavior Modification       Comments:  During today's lesson I discussed the importance of making up to two lifestyle changes a week and adding new changes as the other changes become routine. Examples of lifestyle changes are:  1. Not eating in front of the TV or computer. 2. Journaling intake via an brett or pen and paper. This reveals negative patterns such as mindless eating, stress eating and late night eating for other reason then hunger. Goals that patient wants to work on includes:  Increase activity. Decrease portions sizes and eat healthy. Increase water intake.         Aayush Moss, RD

## 2019-09-05 ENCOUNTER — OFFICE VISIT (OUTPATIENT)
Dept: FAMILY MEDICINE CLINIC | Age: 54
End: 2019-09-05

## 2019-09-05 VITALS
HEART RATE: 68 BPM | OXYGEN SATURATION: 96 % | RESPIRATION RATE: 14 BRPM | TEMPERATURE: 98 F | DIASTOLIC BLOOD PRESSURE: 76 MMHG | BODY MASS INDEX: 43.35 KG/M2 | WEIGHT: 286 LBS | HEIGHT: 68 IN | SYSTOLIC BLOOD PRESSURE: 119 MMHG

## 2019-09-05 DIAGNOSIS — E11.9 TYPE 2 DIABETES MELLITUS WITHOUT COMPLICATION, WITHOUT LONG-TERM CURRENT USE OF INSULIN (HCC): ICD-10-CM

## 2019-09-05 DIAGNOSIS — I10 ESSENTIAL HYPERTENSION WITH GOAL BLOOD PRESSURE LESS THAN 130/80: ICD-10-CM

## 2019-09-05 DIAGNOSIS — N17.9 AKI (ACUTE KIDNEY INJURY) (HCC): Primary | ICD-10-CM

## 2019-09-05 NOTE — PROGRESS NOTES
1. Have you been to the ER, urgent care clinic since your last visit? Hospitalized since your last visit? Yes When: 8-31-19 Adiel Grant for low blood pressure    2. Have you seen or consulted any other health care providers outside of the [address] [place] [address] since your last visit? Include any pap smears or colon screening.  No

## 2019-09-05 NOTE — PATIENT INSTRUCTIONS
Acute Kidney Injury: Care Instructions  Your Care Instructions    Acute kidney injury (ALICIA) is a sudden decrease in kidney function. This can happen over a period of hours, days or, in some cases, weeks. ALICIA used to be called acute renal failure, but kidney failure doesn't always happen with ALICIA. Common causes of ALICIA are dehydration, blood loss, and medicines. When ALICIA happens, the kidneys have trouble removing waste and excess fluids from the body. The waste and fluids build up and become harmful. Kidney function may return to normal if the cause of ALICIA is treated quickly. Your chance of a full recovery depends on what caused the problem, how quickly the cause was treated, and what other medical problems you have. A machine may be used to help your kidneys remove waste and fluids for a short period of time. This is called dialysis. Follow-up care is a key part of your treatment and safety. Be sure to make and go to all appointments, and call your doctor if you are having problems. It's also a good idea to know your test results and keep a list of the medicines you take. How can you care for yourself at home? · Talk to your doctor about how much fluid you should drink. · Eat a balanced diet. Talk to your doctor or a dietitian about what type of diet may be best for you. You may need to limit sodium, potassium, and phosphorus. · If you need dialysis, follow the instructions and schedule for dialysis that your doctor gives you. · Do not smoke. Smoking can make your condition worse. If you need help quitting, talk to your doctor about stop-smoking programs and medicines. These can increase your chances of quitting for good. · Do not drink alcohol. · Review all of your medicines with your doctor. Do not take any medicines, including nonsteroidal anti-inflammatory drugs (NSAIDs) such as ibuprofen (Advil, Motrin) or naproxen (Aleve), unless your doctor says it is safe for you to do so.   · Make sure that anyone treating you for any health problem knows that you have had ALICIA. When should you call for help? Call 911 anytime you think you may need emergency care. For example, call if:    · You passed out (lost consciousness).    Call your doctor now or seek immediate medical care if:    · You have new or worse nausea and vomiting.     · You have much less urine than normal, or you have no urine.     · You are feeling confused or cannot think clearly.     · You have new or more blood in your urine.     · You have new swelling.     · You are dizzy or lightheaded, or feel like you may faint.    Watch closely for changes in your health, and be sure to contact your doctor if:    · You do not get better as expected. Where can you learn more? Go to http://andreia-liliane.info/. Enter I981 in the search box to learn more about \"Acute Kidney Injury: Care Instructions. \"  Current as of: October 31, 2018  Content Version: 12.1  © 7530-4803 [employer]. Care instructions adapted under license by [employer] (which disclaims liability or warranty for this information). If you have questions about a medical condition or this instruction, always ask your healthcare professional. [address] any warranty or liability for your use of this information.

## 2019-09-05 NOTE — PROGRESS NOTES
[facility]    CC: F/U for chronic disease management    HPI:   ED note and labs reviewed and summarized as noted below    ALICIA:  -Seen in [place] ED on 8/31/2019 for lightheadedness and low BP  -Found to have an ALICIA (Cr was 1.8)  -ALICIA felt to be 2/2 dehydration  -Symptoms resolved with IV fluid  -Advised to maintain good hydration and to hold medications that may affect his kidney  -Patient reports drinking water and denies and issues with lightheadedness      HTN:  -Current only taking Lisinopril   -Denies any side effects or issues with the medication  -Has been checked blood pressure at home. No log brought in for review.   -Reports blood pressure at home has been in normal range  -Currently following bariatric program diet      DMII:  -Stopped metformin due to ALICIA  -Does not check blood sugar at home  -Currently following bariatric program diet      ROS: Positive items marked in RED  CON: fever, chills  Cardiovascular: palpitations, CP  Resp: SOB, cough  GI: nausea, vomiting, diarrhea  : dysuria, hematuria      Past Medical History:   Diagnosis Date    Adverse effect of anesthesia     brother- BP drops after surgery-difficult to wake    Back pain     Chest pain     Diabetes (HCC)     Diverticulitis     Esophageal reflux     GERD (gastroesophageal reflux disease)     Hypertension     Kidney stones     Osteoarthritis     Pneumonia     Psoriatic arthritis, 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[facility] [address]     dx 5/2019, Dr. Valencia Gonzalez, rheum    Renal calculus, right     Snores     Swallowing difficulty     Ureteral calculi     Urine leukocytes        Past Surgical History:   Procedure Laterality Date    HX CHOLECYSTECTOMY      2002    HX COLECTOMY  2017    HX COLONOSCOPY  02.22.2016    HX UROLOGICAL  06/06/2016    [facility]Cystoscopy, CYSTOURETHROSCOPY W/ INDWELLING URETERAL STENT INSERTION, Dr Malick De Jesus     UROLOGICAL  05/29/2016    [facility]- Cystoscopy, Right retrograde pyelography, Right 6 x 26 cm double-J ureteral stent placement,Intraoperative fluoro less than 1 hour,Interpretation of Urography, Dr Tomasa Mcdonald Left        Family History   Problem Relation Age of Onset    Cancer Mother         Lung    Hypertension Mother     Stroke Mother     Cancer Father         Brain    Heart Attack Father        Social History     Socioeconomic History    Marital status:      Spouse name: Not on file    Number of children: Not on file    Years of education: Not on file    Highest education level: Not on file   Tobacco Use    Smoking status: Never Smoker    Smokeless tobacco: Never Used   Substance and Sexual Activity    Alcohol use: Yes     Alcohol/week: 0.0 standard drinks     Comment: occ    Drug use: No    Sexual activity: Yes     Partners: Female       Allergies   Allergen Reactions    Medrol [Methylprednisolone] Other (comments)     tachycardia  Patient states he had a severe allergic reaction about 15 years ago.  Lipitor [Atorvastatin] Other (comments)     Muscle cramping    Hydrocodone-Acetaminophen Rash         Current Outpatient Medications:     oxyCODONE-acetaminophen (PERCOCET) 7.5-325 mg per tablet, Take  by mouth., Disp: , Rfl:     pravastatin (PRAVACHOL) 40 mg tablet, TAKE 1 TABLET BY MOUTH ONCE DAILY AT BEDTIME FOR CHOLESTEROL, Disp: 90 Tab, Rfl: 0    metFORMIN (GLUCOPHAGE) 1,000 mg tablet, TAKE ONE TABLET BY MOUTH TWICE A DAY WITH MEALS , Disp: 60 Tab, Rfl: 0    gabapentin (NEURONTIN) 600 mg tablet, Take 1 Tab by mouth three (3) times daily. , Disp: 90 Tab, Rfl: 2    traZODone (DESYREL) 100 mg tablet, Take 1 Tab by mouth nightly., Disp: 90 Tab, Rfl: 1    hydrOXYzine HCl (ATARAX) 50 mg tablet, Take 1 Tab by mouth every six (6) hours as needed for Anxiety. , Disp: 180 Tab, Rfl: 1    omeprazole (PRILOSEC OTC) 20 mg tablet, Take 20 mg by mouth daily. , Disp: , Rfl:     lisinopril-hydroCHLOROthiazide (PRINZIDE, ZESTORETIC) 20-12.5 mg per tablet, Take 2 Tabs by mouth daily. , Disp: 180 Tab, Rfl: 1    ibuprofen (MOTRIN) 800 mg tablet, Take 1 Tab by mouth every six (6) hours as needed for Pain., Disp: 180 Tab, Rfl: 1    cyclobenzaprine (FLEXERIL) 10 mg tablet, Take 1 Tab by mouth three (3) times daily as needed for Muscle Spasm(s). Indications: muscle spasm, Disp: 30 Tab, Rfl: 1    fluticasone propionate (FLONASE) 50 mcg/actuation nasal spray, 2 Sprays by Both Nostrils route daily. , Disp: 1 Bottle, Rfl: 3    sildenafil citrate (VIAGRA) 100 mg tablet, take 1 tablet 1 hour prior to intercourse, Disp: 10 Tab, Rfl: 3    VOLTAREN 1 % gel, Apply  to affected area every six (6) hours. , Disp: 4 g, Rfl: 1    Physical Exam:      /76   Pulse 68   Temp 98 °F (36.7 °C) (Oral)   Resp 14   Ht 5' 8\" (1.727 m)   Wt 286 lb (129.7 kg)   SpO2 96%   BMI 43.49 kg/m²     General: obese habitus, NAD, conversant  Eyes: sclera clear bilaterally, no discharge noted, eyelids normal in appearance  HENT: NCAT  Lungs: CTAB, normal respiratory effort and rate  CV: RRR, no MRGs  ABD: soft, non-tender, non-distended, normal bowel sounds  Skin: normal temperature, turgor, color, and texture  Psych: alert and oriented to person, place and situation, normal affect  Neuro: speech normal, moving all extremities, gait normal      Assessment/Plan     ALICIA:  -Likely secondary to dehydration  -Patient encouraged to maintain good hydration  -CMP, urine creatinine, and urine urea ordered  -Plan to calculate FEUrea if ALICIA persists  -Handout given on acute renal failure care  -Follow-up in 2 weeks for ALICIA      HTN, well controlled:  -BP currently at goal of less than 130/80  -Will continue current lisinopril regimen and continue holding HCTZ  -CBC and CMP ordered  -Follow-up in 2 weeks for ALICIA      DMII, Likely Inadequately Controlled:  -Will defer decision to resume metformin until ALICIA has fully resolved  -CBC and CMP ordered  -Follow-up in 2 weeks for ALICIA        Negrita Brennan MD  9/5/2019, 3:18 PM

## 2019-09-06 LAB
A-G RATIO,AGRAT: 1.5 RATIO (ref 1.1–2.6)
ABSOLUTE LYMPHOCYTE COUNT, 10803: 1.7 K/UL (ref 1–4.8)
ALBUMIN SERPL-MCNC: 4.6 G/DL (ref 3.5–5)
ALP SERPL-CCNC: 47 U/L (ref 25–115)
ALT SERPL-CCNC: 29 U/L (ref 5–40)
ANION GAP SERPL CALC-SCNC: 13 MMOL/L
AST SERPL W P-5'-P-CCNC: 20 U/L (ref 10–37)
BASOPHILS # BLD: 0.1 K/UL (ref 0–0.2)
BASOPHILS NFR BLD: 1 % (ref 0–2)
BILIRUB SERPL-MCNC: 0.3 MG/DL (ref 0.2–1.2)
BUN SERPL-MCNC: 18 MG/DL (ref 6–22)
CALCIUM SERPL-MCNC: 9.2 MG/DL (ref 8.4–10.4)
CHLORIDE SERPL-SCNC: 101 MMOL/L (ref 98–110)
CO2 SERPL-SCNC: 25 MMOL/L (ref 20–32)
CREAT SERPL-MCNC: 0.9 MG/DL (ref 0.5–1.2)
CREATININE, URINE: 186 MG/DL
EOSINOPHIL # BLD: 0.3 K/UL (ref 0–0.5)
EOSINOPHIL NFR BLD: 4 % (ref 0–6)
ERYTHROCYTE [DISTWIDTH] IN BLOOD BY AUTOMATED COUNT: 12.8 % (ref 10–15.5)
GFRAA, 66117: >60
GFRNA, 66118: >60
GLOBULIN,GLOB: 3 G/DL (ref 2–4)
GLUCOSE SERPL-MCNC: 106 MG/DL (ref 70–99)
GRANULOCYTES,GRANS: 62 % (ref 40–75)
HCT VFR BLD AUTO: 38.2 % (ref 39.3–51.6)
HGB BLD-MCNC: 12.6 G/DL (ref 13.1–17.2)
LYMPHOCYTES, LYMLT: 26 % (ref 20–45)
Lab: 1072 MG/DL
MCH RBC QN AUTO: 29 PG (ref 26–34)
MCHC RBC AUTO-ENTMCNC: 33 G/DL (ref 31–36)
MCV RBC AUTO: 89 FL (ref 80–95)
MONOCYTES # BLD: 0.5 K/UL (ref 0.1–1)
MONOCYTES NFR BLD: 7 % (ref 3–12)
NEUTROPHILS # BLD AUTO: 4 K/UL (ref 1.8–7.7)
PLATELET # BLD AUTO: 224 K/UL (ref 140–440)
PMV BLD AUTO: 11.2 FL (ref 9–13)
POTASSIUM SERPL-SCNC: 4.1 MMOL/L (ref 3.5–5.5)
PROT SERPL-MCNC: 7.6 G/DL (ref 6.4–8.3)
RBC # BLD AUTO: 4.29 M/UL (ref 3.8–5.8)
SODIUM SERPL-SCNC: 139 MMOL/L (ref 133–145)
WBC # BLD AUTO: 6.5 K/UL (ref 4–11)

## 2019-09-17 ENCOUNTER — OFFICE VISIT (OUTPATIENT)
Dept: FAMILY MEDICINE CLINIC | Age: 54
End: 2019-09-17

## 2019-09-17 ENCOUNTER — TELEPHONE (OUTPATIENT)
Dept: SURGERY | Age: 54
End: 2019-09-17

## 2019-09-17 VITALS
TEMPERATURE: 96.4 F | HEIGHT: 68 IN | WEIGHT: 281 LBS | SYSTOLIC BLOOD PRESSURE: 141 MMHG | HEART RATE: 73 BPM | DIASTOLIC BLOOD PRESSURE: 90 MMHG | RESPIRATION RATE: 14 BRPM | BODY MASS INDEX: 42.59 KG/M2 | OXYGEN SATURATION: 94 %

## 2019-09-17 DIAGNOSIS — K21.9 GASTROESOPHAGEAL REFLUX DISEASE WITHOUT ESOPHAGITIS: ICD-10-CM

## 2019-09-17 DIAGNOSIS — E11.9 CONTROLLED TYPE 2 DIABETES MELLITUS WITHOUT COMPLICATION, WITHOUT LONG-TERM CURRENT USE OF INSULIN (HCC): ICD-10-CM

## 2019-09-17 DIAGNOSIS — N17.9 AKI (ACUTE KIDNEY INJURY) (HCC): Primary | ICD-10-CM

## 2019-09-17 DIAGNOSIS — I10 HTN (HYPERTENSION), BENIGN: ICD-10-CM

## 2019-09-17 DIAGNOSIS — E11.21 TYPE 2 DIABETES WITH NEPHROPATHY (HCC): ICD-10-CM

## 2019-09-17 DIAGNOSIS — E66.01 CLASS 3 SEVERE OBESITY DUE TO EXCESS CALORIES WITH SERIOUS COMORBIDITY AND BODY MASS INDEX (BMI) OF 40.0 TO 44.9 IN ADULT (HCC): Primary | ICD-10-CM

## 2019-09-17 DIAGNOSIS — I10 ESSENTIAL HYPERTENSION WITH GOAL BLOOD PRESSURE LESS THAN 130/80: ICD-10-CM

## 2019-09-17 DIAGNOSIS — G47.33 OBSTRUCTIVE SLEEP APNEA: ICD-10-CM

## 2019-09-17 RX ORDER — LISINOPRIL 40 MG/1
40 TABLET ORAL DAILY
Qty: 90 TAB | Refills: 1 | Status: SHIPPED | OUTPATIENT
Start: 2019-09-17 | End: 2019-12-05 | Stop reason: ALTCHOICE

## 2019-09-17 NOTE — TELEPHONE ENCOUNTER
Patient called to clinic requesting a new order to be placed for his CT ABD. Patient states \"my doctor cleared me for it\". Upon review of his chart patient did see Dr. Jaki Hubbard this morning in which she did have labs run and the GFR, C&C, and Urea nitrogen all were WNL, however his H&H is impaired. I informed the patient that this must be reviewed by the ordering provider and explained that ALICIA can impair the body's ability to filter to contrast used during the procedure and lead to further kidney injury, and I expressed I would sen this to OSIEL Prabhakar and upon review, I would contact the patient with the determination and recommendation of the provider. He expressed some frustration from this and verbalized \"I just need the test reordered\", \"they said It was still there but they couldn't see it\". I reinforced the education regarding ALICIA and the potential for injury to the kidney and informed he we cannot arbitrarily place orders and they must come directly from a provider. Patient expressed verbal understanding of the information proved and I did express to him I would handle this as expediently as I could as to give him resolve and ease his concerns. Patient thanked me and the call was ended.

## 2019-09-17 NOTE — PROGRESS NOTES
1. Have you been to the ER, urgent care clinic since your last visit? Hospitalized since your last visit? No    2. Have you seen or consulted any other health care providers outside of the [address] [place] [address] since your last visit? Include any pap smears or colon screening.  No

## 2019-09-17 NOTE — PROGRESS NOTES
[facility]    CC: ALICIA    HPI:     ALICIA:  -Has remained off HCTZ, gabapentin, and metformin as he is instructed by ED  -Got requested lab work   -Has not had recurrence of symptoms that initially brought him to ED  -States he is currently drinking 128 ounces of water      Hypertension:  -Taking blood pressure medications except for HCTZ  -Took medications a few moments prior to coming in for visit  -Think BP is currently elevated due to being in pain (Did not take his pain medication this morning as he was concerned it may affect any blood work that might be done today)  -Does not check blood pressure at home      ROS: Positive items marked in RED  CON: fever, chills  Cardiovascular: palpitations, CP  Resp: SOB, cough  GI: nausea, vomiting, diarrhea  : dysuria, hematuria      Past Medical History:   Diagnosis Date    Adverse effect of anesthesia     brother- BP drops after surgery-difficult to wake    Back pain     Chest pain     Diabetes ([facility]     Diverticulitis     Esophageal reflux     GERD (gastroesophageal reflux disease)     Hypertension     Kidney stones     Osteoarthritis     Pneumonia     Psoriatic arthritis, 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[facility] [address]     dx 5/2019, Dr. Yosi Amador, rheum    Renal calculus, right     Snores     Swallowing difficulty     Ureteral calculi     Urine leukocytes        Past Surgical History:   Procedure Laterality Date    HX CHOLECYSTECTOMY      2002    HX COLECTOMY  2017    HX COLONOSCOPY  02.22.2016    HX UROLOGICAL  06/06/2016    [facility]-Cystoscopy, CYSTOURETHROSCOPY W/ INDWELLING URETERAL STENT INSERTION, Dr Tootie Torres  UROLOGICAL  05/29/2016    [facility]- Cystoscopy, Right retrograde pyelography, Right 6 x 26 cm double-J ureteral stent placement,Intraoperative fluoro less than 1 hour,Interpretation of Urography, Dr Russell Sorensen PARTIAL HIP REPLACEMENT Left        Family History   Problem Relation Age of Onset    Cancer Mother         Lung    Hypertension Mother  Stroke Mother     Cancer Father         Brain    Heart Attack Father        Social History     Socioeconomic History    Marital status:      Spouse name: Not on file    Number of children: Not on file    Years of education: Not on file    Highest education level: Not on file   Tobacco Use    Smoking status: Never Smoker    Smokeless tobacco: Never Used   Substance and Sexual Activity    Alcohol use: Yes     Alcohol/week: 0.0 standard drinks     Comment: occ    Drug use: No    Sexual activity: Yes     Partners: Female       Allergies   Allergen Reactions    Medrol [Methylprednisolone] Other (comments)     tachycardia  Patient states he had a severe allergic reaction about 15 years ago.  Lipitor [Atorvastatin] Other (comments)     Muscle cramping    Hydrocodone-Acetaminophen Rash         Current Outpatient Medications:     adalimumab (HUMIRA) 40 mg/0.8 mL injection, by SubCUTAneous route once., Disp: , Rfl:     oxyCODONE-acetaminophen (PERCOCET) 7.5-325 mg per tablet, Take  by mouth., Disp: , Rfl:     ibuprofen (MOTRIN) 800 mg tablet, Take 1 Tab by mouth every six (6) hours as needed for Pain., Disp: 180 Tab, Rfl: 1    cyclobenzaprine (FLEXERIL) 10 mg tablet, Take 1 Tab by mouth three (3) times daily as needed for Muscle Spasm(s). Indications: muscle spasm, Disp: 30 Tab, Rfl: 1    traZODone (DESYREL) 100 mg tablet, Take 1 Tab by mouth nightly., Disp: 90 Tab, Rfl: 1    hydrOXYzine HCl (ATARAX) 50 mg tablet, Take 1 Tab by mouth every six (6) hours as needed for Anxiety. , Disp: 180 Tab, Rfl: 1    omeprazole (PRILOSEC OTC) 20 mg tablet, Take 20 mg by mouth daily. , Disp: , Rfl:     fluticasone propionate (FLONASE) 50 mcg/actuation nasal spray, 2 Sprays by Both Nostrils route daily. , Disp: 1 Bottle, Rfl: 3    sildenafil citrate (VIAGRA) 100 mg tablet, take 1 tablet 1 hour prior to intercourse, Disp: 10 Tab, Rfl: 3    VOLTAREN 1 % gel, Apply  to affected area every six (6) hours. , Disp: 4 g, Rfl: 1    pravastatin (PRAVACHOL) 40 mg tablet, TAKE 1 TABLET BY MOUTH ONCE DAILY AT BEDTIME FOR CHOLESTEROL, Disp: 90 Tab, Rfl: 0    metFORMIN (GLUCOPHAGE) 1,000 mg tablet, TAKE ONE TABLET BY MOUTH TWICE A DAY WITH MEALS , Disp: 60 Tab, Rfl: 0    gabapentin (NEURONTIN) 600 mg tablet, Take 1 Tab by mouth three (3) times daily. , Disp: 90 Tab, Rfl: 2    lisinopril-hydroCHLOROthiazide (PRINZIDE, ZESTORETIC) 20-12.5 mg per tablet, Take 2 Tabs by mouth daily. , Disp: 180 Tab, Rfl: 1    Physical Exam:      /90   Pulse 73   Temp 96.4 °F (35.8 °C) (Oral)   Resp 14   Ht 5' 8\" (1.727 m)   Wt 281 lb (127.5 kg)   SpO2 94%   BMI 42.73 kg/m²     General: obese habitus, appears to be in mild to moderate discomfort, conversant  Eyes: sclera clear bilaterally, no discharge noted, eyelids normal in appearance  HENT: NCAT  Lungs: CTAB, normal respiratory effort and rate  CV: RRR, no MRGs  Skin: normal temperature, turgor, color, and texture  Psych: alert and oriented to person, place and situation, normal affect  Neuro: speech normal, moving all extremities, gait normal    Results for Maddie Yepez (MRN [id number]):   Ref. Range 9/5/2019 15:33   WBC Latest Ref Range: 4.0 - 11.0 K/uL 6.5   RBC Latest Ref Range: 3.80 - 5.80 M/uL 4.29   HGB Latest Ref Range: 13.1 - 17.2 g/dL 12.6 (L)   HCT Latest Ref Range: 39.3 - 51.6 % 38.2 (L)   MCV Latest Ref Range: 80 - 95 fL 89   MCH Latest Ref Range: 26 - 34 pg 29   MCHC Latest Ref Range: 31 - 36 g/dL 33   RDW Latest Ref Range: 10.0 - 15.5 % 12.8   PLATELET Latest Ref Range: 140 - 440 K/uL 224   MPV Latest Ref Range: 9.0 - 13.0 fL 11.2   NEUTROPHILS Latest Ref Range: 40 - 75 % 62   Lymphocytes Latest Ref Range: 20 - 45 % 26   MONOCYTES Latest Ref Range: 3 - 12 % 7   EOSINOPHILS Latest Ref Range: 0 - 6 % 4   BASOPHILS Latest Ref Range: 0 - 2 % 1   ABS. NEUTROPHILS Latest Ref Range: 1.8 - 7.7 K/uL 4.0   ABS. MONOCYTES Latest Ref Range: 0.1 - 1.0 K/uL 0.5   ABS. EOSINOPHILS Latest Ref Range: 0.0 - 0.5 K/uL 0.3   ABS. BASOPHILS Latest Ref Range: 0.0 - 0.2 K/uL 0.1   Sodium Latest Ref Range: 133 - 145 mmol/L 139   Potassium Latest Ref Range: 3.5 - 5.5 mmol/L 4.1   Chloride Latest Ref Range: 98 - 110 mmol/L 101   CO2 Latest Ref Range: 20 - 32 mmol/L 25   Anion gap Latest Units: mmol/L 13.0   Glucose Latest Ref Range: 70 - 99 mg/dL 106 (H)   BUN Latest Ref Range: 6 - 22 mg/dL 18   Creatinine Latest Ref Range: 0.5 - 1.2 mg/dL 0.9   Calcium Latest Ref Range: 8.4 - 10.4 mg/dL 9.2   Bilirubin, total Latest Ref Range: 0.2 - 1.2 mg/dL 0.3   Protein, total Latest Ref Range: 6.4 - 8.3 g/dL 7.6   Albumin Latest Ref Range: 3.5 - 5.0 g/dL 4.6   Globulin Latest Ref Range: 2.0 - 4.0 g/dL 3.0   A-G Ratio Latest Ref Range: 1.1 - 2.6 ratio 1.5   ALT (SGPT) Latest Ref Range: 5 - 40 U/L 29   AST Latest Ref Range: 10 - 37 U/L 20   Alk. phosphatase Latest Ref Range: 25 - 115 U/L 47   Creatinine, urine Latest Units: mg/dL 186       Assessment/Plan     ALICIA, resolved:  -Likely secondary to dehydration  -Advised to resume all of his held medications except for HCTZ  -Currently no indication for further intervention  -Encouraged to continue good hydration  -Follow-up in 1 month for blood pressure management      Hypertension:  -BP currently not at goal of less than 130/80  -BP was notably at goal at prior visit without HCTZ.   Suspect current discomfort due to pain may be playing a role in currently elevated BP  -Will defer doing any adjustments to BP medication  -Patient advised to log home BP and to take any pain medication if needed on day of next appointment  -Follow-up in 1 month for blood pressure management        Amanda Hunter MD  9/17/2019, 8:06 AM

## 2019-09-30 ENCOUNTER — DOCUMENTATION ONLY (OUTPATIENT)
Dept: SURGERY | Age: 54
End: 2019-09-30

## 2019-09-30 ENCOUNTER — HOSPITAL ENCOUNTER (OUTPATIENT)
Dept: CT IMAGING | Age: 54
Discharge: HOME | End: 2019-09-30
Attending: PHYSICIAN ASSISTANT
Payer: MEDICAID

## 2019-09-30 DIAGNOSIS — E11.21 TYPE 2 DIABETES WITH NEPHROPATHY (HCC): ICD-10-CM

## 2019-09-30 DIAGNOSIS — I10 HTN (HYPERTENSION), BENIGN: ICD-10-CM

## 2019-09-30 DIAGNOSIS — E66.01 CLASS 3 SEVERE OBESITY DUE TO EXCESS CALORIES WITH SERIOUS COMORBIDITY AND BODY MASS INDEX (BMI) OF 40.0 TO 44.9 IN ADULT (HCC): ICD-10-CM

## 2019-09-30 DIAGNOSIS — K21.9 GASTROESOPHAGEAL REFLUX DISEASE WITHOUT ESOPHAGITIS: ICD-10-CM

## 2019-09-30 DIAGNOSIS — G47.33 OBSTRUCTIVE SLEEP APNEA: ICD-10-CM

## 2019-09-30 DIAGNOSIS — E11.9 CONTROLLED TYPE 2 DIABETES MELLITUS WITHOUT COMPLICATION, WITHOUT LONG-TERM CURRENT USE OF INSULIN (HCC): ICD-10-CM

## 2019-09-30 LAB — CREAT UR-MCNC: 1 MG/DL (ref 0.6–1.3)

## 2019-09-30 PROCEDURE — 74011636320 HC RX REV CODE- 636/320: Performed by: PHYSICIAN ASSISTANT

## 2019-09-30 PROCEDURE — 74011000255 HC RX REV CODE- 255: Performed by: PHYSICIAN ASSISTANT

## 2019-09-30 PROCEDURE — 74177 CT ABD & PELVIS W/CONTRAST: CPT

## 2019-09-30 PROCEDURE — 82565 ASSAY OF CREATININE: CPT

## 2019-09-30 RX ORDER — BARIUM SULFATE 20 MG/ML
900 SUSPENSION ORAL
Status: COMPLETED | OUTPATIENT
Start: 2019-09-30 | End: 2019-09-30

## 2019-09-30 RX ADMIN — BARIUM SULFATE 900 ML: 20 SUSPENSION ORAL at 07:42

## 2019-09-30 RX ADMIN — IOPAMIDOL 100 ML: 612 INJECTION, SOLUTION INTRAVENOUS at 07:37

## 2019-09-30 NOTE — PROGRESS NOTES
[facility]  [address] [facility], [address] [place] [address], [address]  Patient's Name: Lucila Acosta   Age: 48 y.o. YOB: 1965   Sex: male    Date:  09/16/2019    Session: 11 of 6  Surgeon:  Dr. Alejandra Miller    Height: 5'8\" Weight:    281      Lbs. BMI: 42     Starting Weight: 303        Do you smoke? no    Alcohol intake:    I don't drink at all. Class Guidelines    Guidelines are reviewed with patient at the start of every class. 1. Patient understands that weight loss trial classes must be consecutive. Patient understands if they miss a class, it is their responsibility to contact me to reschedule class. I will reach out to patient after their first no show. 2.  Patient understands the expectations that weight maintenance/weight loss is expected during the classes. Failure to demonstrate changes may result in one extra month of weight loss trial, followed by going back to see the surgeon. 3. Patient is also instructed to be doing their labs, blood work, psych visit, support group and any other test that the surgeon has used while they are working on their weight loss trial.    Changes Made Since Last Class:   Drinking more water. Eating Habits and Behaviors      Today we reviewed key diet principles. We talked about ways that patient can follow a low calorie diet. These included: Stopping all liquid calories and patient was given a list of fluid choices that would be appropriate. We talked about carbohydrates. Patient was educated that all carbohydrates turn to sugar and was encouraged to stick to the complex carbohydrates while in weight loss trials, but aim to keep less than 100 grams during weight loss trials. Patient was given a list of foods to not eat and drink due to high sugar, high fat, or high carbohydrate content.   Patient was also given a list of foods and drinks that are high protein, low carbohydrate, and would be appropriate to eat. Patient was given a list of fruit and what a portion size is and how many carbohydrates it contains. Patient was encouraged to keep fruit intake to a minimum. Patient was also given a list of 50 low carbohydrate snack options, but was also cautioned that some of the choices still were high in calories and portion control should be practiced. Patient's current diet habits include: Patient eats 3 meals a day consisting of eggs, fruit, protein drink, chicken, fish, and vegetables. Physical Activity/Exercise    Comments:     Currently for exercise, patient is walking. We talked about activities for patient to do, including walking, swimming, or chair exercises. Behavior Modification       Comments:   Patient was encouraged to food journal. I talked with patient about tracking their daily carbohydrate. We also talked about the importance of planning ahead. Lack of willpower is often a lack of planning. We also covered the need to get sufficient sleep and ways to accomplish that. We also discussed how important it is to not snack mindlessly in the evening and to consider dinner the last meal of the day. Goals for next month: Increase activity. Decrease portions size and cut carbs more.     Armando Bunn RD

## 2019-10-02 ENCOUNTER — TELEPHONE (OUTPATIENT)
Dept: SURGERY | Age: 54
End: 2019-10-02

## 2019-10-02 NOTE — TELEPHONE ENCOUNTER
Called patient and reviewed with him the request for him to establish care with Merari Ku NP and to review his recent CT results. Patient requested to be added either Monday or tuesday. Patient scheduled for [email or web address].

## 2019-10-07 ENCOUNTER — OFFICE VISIT (OUTPATIENT)
Dept: SURGERY | Age: 54
End: 2019-10-07

## 2019-10-07 VITALS
HEIGHT: 68 IN | SYSTOLIC BLOOD PRESSURE: 122 MMHG | OXYGEN SATURATION: 96 % | BODY MASS INDEX: 43.65 KG/M2 | TEMPERATURE: 97.5 F | RESPIRATION RATE: 20 BRPM | HEART RATE: 87 BPM | DIASTOLIC BLOOD PRESSURE: 86 MMHG | WEIGHT: 288 LBS

## 2019-10-07 DIAGNOSIS — R10.32 LEFT LOWER QUADRANT ABDOMINAL PAIN: ICD-10-CM

## 2019-10-07 DIAGNOSIS — E78.2 MIXED HYPERLIPIDEMIA: ICD-10-CM

## 2019-10-07 DIAGNOSIS — E11.9 CONTROLLED TYPE 2 DIABETES MELLITUS WITHOUT COMPLICATION, WITHOUT LONG-TERM CURRENT USE OF INSULIN (HCC): ICD-10-CM

## 2019-10-07 DIAGNOSIS — E66.01 MORBID OBESITY (HCC): Primary | ICD-10-CM

## 2019-10-07 DIAGNOSIS — I10 ESSENTIAL HYPERTENSION: ICD-10-CM

## 2019-10-07 NOTE — PROGRESS NOTES
Bariatric Preoperative Progress Note      Subjective:     Mihai Morrow is a 48 y.o. male who presents today for followup of their candidacy for bariatric surgery. Since last seen, Mihai Morrow has been working through bariatric program towards LGBP. Last seen by GENIA Frost 8/23/19 who was also working him up for LLQ abdominal pain s/p colectomy. Past Medical History:   Diagnosis Date    Adverse effect of anesthesia     brother- BP drops after surgery-difficult to wake    Back pain     Chest pain     Diabetes (HCC)     Diverticulitis     Esophageal reflux     GERD (gastroesophageal reflux disease)     Hypertension     Kidney stones     Osteoarthritis     Pneumonia     Psoriatic arthritis, 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 Lakhwinder Cam, rheum    Renal calculus, right     Snores     Swallowing difficulty     Ureteral calculi     Urine leukocytes        Past Surgical History:   Procedure Laterality Date    HX CHOLECYSTECTOMY      2002    HX COLECTOMY  2017    HX COLONOSCOPY  02.22.2016    HX UROLOGICAL  06/06/2016    [facility]-Cystoscopy, CYSTOURETHROSCOPY W/ INDWELLING URETERAL STENT INSERTION, Dr Kirby Deshpande HX UROLOGICAL  05/29/2016    [facility]- Cystoscopy, Right retrograde pyelography, Right 6 x 26 cm double-J ureteral stent placement,Intraoperative fluoro less than 1 hour,Interpretation of Urography, Dr Eric Bishop Left        Current Outpatient Medications   Medication Sig Dispense Refill    adalimumab (HUMIRA) 40 mg/0.8 mL injection by SubCUTAneous route once.  lisinopril (PRINIVIL, ZESTRIL) 40 mg tablet Take 1 Tab by mouth daily. 90 Tab 1    oxyCODONE-acetaminophen (PERCOCET) 7.5-325 mg per tablet Take  by mouth.       pravastatin (PRAVACHOL) 40 mg tablet TAKE 1 TABLET BY MOUTH ONCE DAILY AT BEDTIME FOR CHOLESTEROL 90 Tab 0    metFORMIN (GLUCOPHAGE) 1,000 mg tablet TAKE ONE TABLET BY MOUTH TWICE A DAY WITH MEALS  60 Tab 0    ibuprofen (MOTRIN) 800 mg tablet Take 1 Tab by mouth every six (6) hours as needed for Pain. 180 Tab 1    cyclobenzaprine (FLEXERIL) 10 mg tablet Take 1 Tab by mouth three (3) times daily as needed for Muscle Spasm(s). Indications: muscle spasm 30 Tab 1    gabapentin (NEURONTIN) 600 mg tablet Take 1 Tab by mouth three (3) times daily. 90 Tab 2    traZODone (DESYREL) 100 mg tablet Take 1 Tab by mouth nightly. 90 Tab 1    hydrOXYzine HCl (ATARAX) 50 mg tablet Take 1 Tab by mouth every six (6) hours as needed for Anxiety. 180 Tab 1    omeprazole (PRILOSEC OTC) 20 mg tablet Take 20 mg by mouth daily.  fluticasone propionate (FLONASE) 50 mcg/actuation nasal spray 2 Sprays by Both Nostrils route daily. 1 Bottle 3    sildenafil citrate (VIAGRA) 100 mg tablet take 1 tablet 1 hour prior to intercourse 10 Tab 3    VOLTAREN 1 % gel Apply  to affected area every six (6) hours. 4 g 1       Allergies   Allergen Reactions    Medrol [Methylprednisolone] Other (comments)     tachycardia  Patient states he had a severe allergic reaction about 15 years ago.      Lipitor [Atorvastatin] Other (comments)     Muscle cramping    Hydrocodone-Acetaminophen Rash       Review of Systems:  Positive in BOLD     CONST: Fever, weight loss, fatigue or chills  GI: Nausea, vomiting, abdominal pain LLQ, change in bowel habits, hematochezia, melena, and GERD, umbilical hernia   INTEG: Dermatitis, abnormal moles  HEENT: Recent changes in vision, vertigo, epistaxis, dysphagia and hoarseness  CV: Chest pain, palpitations, HTN, edema and varicosities  RESP: Cough, shortness of breath, wheezing, hemoptysis, snoring and reactive airway disease  : Hematuria, dysuria, frequency, urgency, nocturia and stress urinary incontinence   MS: Weakness, joint pain and arthritis - ankle, knee  ENDO: Diabetes, thyroid disease, polyuria, polydipsia, polyphagia, poor wound healing, heat intolerance, cold intolerance  LYMPH/HEME: Anemia, bruising and history of blood transfusions  NEURO: Dizziness, headache, fainting, seizures and stroke  PSYCH: Anxiety and depression      Objective:     Physical Exam:  Visit Vitals  /86 (BP 1 Location: Left arm, BP Patient Position: Sitting)   Pulse 87   Temp 97.5 °F (36.4 °C) (Oral)   Resp 20   Ht 5' 8\" (1.727 m)   Wt 130.6 kg (288 lb)   SpO2 96%   BMI 43.79 kg/m²       Physical Exam   Constitutional: He is oriented to person, place, and time and well-developed, well-nourished, and in no distress. HENT:   Head: Normocephalic. Cardiovascular: Normal rate and normal heart sounds. Pulmonary/Chest: Effort normal and breath sounds normal.   Abdominal: Soft. Bowel sounds are normal.   Obese, healed lap sites, and LLQ scar    Musculoskeletal: Normal range of motion. Neurological: He is alert and oriented to person, place, and time. Skin: Skin is warm and dry. Psychiatric: Affect normal.       Studies to date:    Labs: significant for  HGB: 12.6 (L)  HCT: 38.2 (L)  VITAMIN D, 25-HYDROXY: 21.5 (L)  Triglyceride: 463 (H)  HDL Cholesterol: 33 (L)  VLDL, calculated: 93 (H)    EKG: Normal sinus rhythm   Normal ECG   No previous ECGs available   Confirmed by Clarice Mcmanus ([id number]) on 8/23/2019 12:44:52 PM     Nutritional evaluation: 5/6, due to finish October     Psychiatric evaluation: approved     Support Group: done     Additional evaluations:    PCP clearance: pending 10/16/2019    CT: IMPRESSION:1.  Evidence of prior partial colonic resection and reanastomosis without evidence of focal bowel wall thickening or dilatation.   2. Small fat-containing ventral abdominal hernia with additional fascial defect  in the left lower quadrant as described above, with omental fat extending  through the transverse abdominis and internal oblique musculature, with the  external oblique musculature appearing intact.   3. Nonobstructing lower pole left renal stone.     Assessment:   Brandon Cunningham is a 48 y.o. male who is progressing through the bariatric preoperative evaluation. Recent ED admission for ALICIA, resolved, likely r/t dehydration   Vit D def   ventral abdominal hernia   Plan:   -start D3 5000units daily   -complete remainder of preop evaluation including remaining WLT, PCP clearance. - patient communicates understanding that the expectation is to lose or maintain weight during WLT. Weight gain may result in delay or cancellation of surgery.   -Follow up once has completed entirety of weight loss workup to determine next steps.       Patient would like hernia's repaired d/t daily pain and effect on ADLs, will discuss with Dr. El Murray      >50% of 30 min visit spent counseling     ELLIE Harry-BC

## 2019-10-07 NOTE — PATIENT INSTRUCTIONS
If you have any questions or concerns about today's appointment, the verbal and/or written instructions you were given for follow up care, please call our office at [phone]. [address] [facility] - [address]    [phone] office  [phone]      Vit D3 5,000 units daily

## 2019-10-07 NOTE — Clinical Note
Sent you CT for review. ..  Wants hernias repaired with lan sx or possibly prior to Also recent ED visit for ALICIA r/t dehydration Pending PCP clearance and last WLT this month

## 2019-10-07 NOTE — Clinical Note
BENEDICTO Andrew/ jewel (previous hayde pt) pcp clearance pending On last WLT Kamran sent CT for review

## 2019-10-07 NOTE — PROGRESS NOTES
Patient presents for follow up for bariatric program. He is here for recent CT results. 1. Have you been to the ER, urgent care clinic since your last visit? Hospitalized since your last visit? No    2. Have you seen or consulted any other health care providers outside of the [address] [place] [address] since your last visit? Include any pap smears or colon screening.  No

## 2019-10-16 ENCOUNTER — OFFICE VISIT (OUTPATIENT)
Dept: FAMILY MEDICINE CLINIC | Age: 54
End: 2019-10-16

## 2019-10-16 VITALS
BODY MASS INDEX: 41.77 KG/M2 | HEART RATE: 76 BPM | RESPIRATION RATE: 14 BRPM | HEIGHT: 69 IN | WEIGHT: 282 LBS | DIASTOLIC BLOOD PRESSURE: 81 MMHG | SYSTOLIC BLOOD PRESSURE: 119 MMHG | TEMPERATURE: 97.3 F | OXYGEN SATURATION: 95 %

## 2019-10-16 DIAGNOSIS — E78.5 HYPERLIPIDEMIA, UNSPECIFIED HYPERLIPIDEMIA TYPE: ICD-10-CM

## 2019-10-16 DIAGNOSIS — E66.01 MORBID OBESITY (HCC): ICD-10-CM

## 2019-10-16 DIAGNOSIS — F51.05 INSOMNIA SECONDARY TO DEPRESSION WITH ANXIETY: ICD-10-CM

## 2019-10-16 DIAGNOSIS — R79.89 LOW VITAMIN D LEVEL: ICD-10-CM

## 2019-10-16 DIAGNOSIS — Z01.818 PREOPERATIVE CLEARANCE: ICD-10-CM

## 2019-10-16 DIAGNOSIS — I10 ESSENTIAL HYPERTENSION WITH GOAL BLOOD PRESSURE LESS THAN 130/80: Primary | ICD-10-CM

## 2019-10-16 DIAGNOSIS — F41.8 INSOMNIA SECONDARY TO DEPRESSION WITH ANXIETY: ICD-10-CM

## 2019-10-16 DIAGNOSIS — E11.9 TYPE 2 DIABETES MELLITUS WITHOUT COMPLICATION, WITHOUT LONG-TERM CURRENT USE OF INSULIN (HCC): ICD-10-CM

## 2019-10-16 DIAGNOSIS — K64.4 EXTERNAL HEMORRHOID: ICD-10-CM

## 2019-10-16 DIAGNOSIS — Z23 ENCOUNTER FOR IMMUNIZATION: ICD-10-CM

## 2019-10-16 RX ORDER — CYANOCOBALAMIN (VITAMIN B-12) 500 MCG
800 TABLET ORAL DAILY
Qty: 180 TAB | Refills: 0 | Status: SHIPPED | OUTPATIENT
Start: 2019-10-16 | End: 2019-11-25

## 2019-10-16 RX ORDER — HYDROCORTISONE 25 MG/G
CREAM TOPICAL 4 TIMES DAILY
Qty: 30 G | Refills: 0 | Status: SHIPPED | OUTPATIENT
Start: 2019-10-16 | End: 2019-11-25

## 2019-10-16 RX ORDER — MINERAL OIL
180 ENEMA (ML) RECTAL DAILY
Qty: 90 TAB | Refills: 3 | Status: SHIPPED | OUTPATIENT
Start: 2019-10-16 | End: 2022-04-13

## 2019-10-16 RX ORDER — TRAZODONE HYDROCHLORIDE 150 MG/1
150 TABLET ORAL
Qty: 30 TAB | Refills: 3 | Status: SHIPPED | OUTPATIENT
Start: 2019-10-16 | End: 2020-04-21 | Stop reason: SDUPTHER

## 2019-10-16 NOTE — PATIENT INSTRUCTIONS
Learning About Vitamin D  Why is it important to get enough vitamin D? Your body needs vitamin D to absorb calcium. Calcium keeps your bones and muscles, including your heart, healthy and strong. If your muscles don't get enough calcium, they can cramp, hurt, or feel weak. You may have long-term (chronic) muscle aches and pains. If you don't get enough vitamin D throughout life, you have an increased chance of having thin and brittle bones (osteoporosis) in your later years. Children who don't get enough vitamin D may not grow as much as others their age. They also have a chance of getting a rare disease called rickets. It causes weak bones. Vitamin D and calcium are added to many foods. And your body uses sunshine to make its own vitamin D. How much vitamin D do you need? The [place] [employer] recommends that people ages 3 through 79 get 600 IU (international units) every day. Adults 71 and older need 800 IU every day. Blood tests for vitamin D can check your vitamin D level. But there is no standard normal range used by all laboratories. You're likely getting enough vitamin D if your levels are in the range of 20 to 50 ng/mL. How can you get more vitamin D? Foods that contain vitamin D include:  · [place], tuna, and mackerel. These are some of the best foods to eat when you need to get more vitamin D.  · Cheese, egg yolks, and beef liver. These foods have vitamin D in small amounts. · Milk, soy drinks, orange juice, yogurt, margarine, and some kinds of cereal have vitamin D added to them. Some people don't make vitamin D as well as others. They may have to take extra care in getting enough vitamin D. Things that reduce how much vitamin D your body makes include:  · Dark skin, such as many  Americans have. · Age, especially if you are older than 72. · Digestive problems, such as Crohn's or celiac disease. · Liver and kidney disease.   Some people who do not get enough vitamin D may need supplements. Are there any risks from taking vitamin D?  · Too much vitamin D:  ? Can damage your kidneys. ? Can cause nausea and vomiting, constipation, and weakness. ? Raises the amount of calcium in your blood. If this happens, you can get confused or have an irregular heart rhythm. · Vitamin D may interact with other medicines. Tell your doctor about all of the medicines you take, including over-the-counter drugs, herbs, and pills. Tell your doctor about all of your current medical problems. Where can you learn more? Go to http://andreia-liliane.info/. Enter [id number] in the search box to learn more about \"Learning About Vitamin D.\"  Current as of: November 7, 2018  Content Version: 12.2  © 5490-2879 [employer]. Care instructions adapted under license by [employer] (which disclaims liability or warranty for this information). If you have questions about a medical condition or this instruction, always ask your healthcare professional. [place] [address] any warranty or liability for your use of this information. Learning About How to Prepare for Weight-Loss Surgery  How can you prepare for weight-loss surgery? Having weight-loss surgery (also called bariatric surgery) is a big step. You can prepare for surgery by having a plan. Your plan may include your goals for losing weight and how to makes changes in your diet, activity, and lifestyle to help raise your chances of success. One way to prepare for surgery is to think about your goal or reason why you want to reach a healthy weight. Do you want to lower your blood pressure, cholesterol, or blood sugar? Do you want to be able to sleep better, play with your kids, or walk around the block? Having a reason can help you stay with your plan and meet your goals. Your weight-loss surgery team can help you meet your goals and get ready for surgery.  Maria D Diaz work with a team that's trained to help you lose weight and make healthy changes in your life. This team may include:  · A medical doctor or nurse to help manage your care and schedule tests before surgery. · A surgeon who specializes in weight-loss surgery. · A registered dietitian to help you plan meals and make changes in the way you eat. · An exercise specialist to help you be more active and get stronger. · A therapist or counselor to help you learn why you eat and teach you ways to deal with stress and your emotions. Your team will also be there to help you prepare for life after surgery. They will help you adjust to new ways of eating and changes to your body. How will weight-loss surgery affect your life? You have likely thought a lot about how surgery may affect your life--how you will eat, how your body will look, or how you will feel. Some people feel overwhelmed with these changes. But planning can help you prepare for the changes and meet your weight-loss goals. One important step in your plan is to learn about the ways surgery will affect your life. These may include:  · A slimmer you. You probably will lose weight very quickly in the first few months after surgery. As time goes on, your weight loss will slow down. How much weight you lose depends on what type of surgery you had and how well your new eating and activity plans are working for you. · A new way of eating. Success in reaching and keeping a healthy weight depends on making lifelong changes in how you eat. After surgery, you raise your chances of success if you:  ? Eat just a few ounces of food at a time. ? Eat very slowly and chew your food to mush. ? Don't drink for 30 minutes before you eat, during your meal, and for 30 minutes after you eat. ? Are careful about drinking alcohol. ? Avoid foods that are high in fat or sugar. ? Take vitamin and mineral supplements. · A healthier you. Weight-loss surgery can have some real health benefits.  Problems like diabetes, high blood pressure, and sleep apnea may go away--or at least become easier to manage. · A more active you. After surgery, being active on most days of the week will help you reach your weight goal and avoid gaining back the weight you lose. · A lot of extra skin. When you lose weight quickly, you may have a lot of extra skin. That's normal. You can have surgery to remove the extra skin if it bothers you. There are going to be some ups and downs while you get used to these changes. So another way to adjust is to identify who can help support you. Getting support from friends and family can help. And joining a support group for people who have had the surgery can be a big help too, because they know what you're going through. As you know, it's a big decision to have weight-loss surgery. But when you have a plan, you can focus on losing weight and living a healthier life. So what steps can you take to prepare for weight-loss surgery? Will you set some goals? Will you learn about how surgery can affect your life? How about asking family or friends for help? Write out your plan. Then get ready. Where can you learn more? Go to http://andreia-liliane.info/. Enter N814 in the search box to learn more about \"Learning About How to Prepare for Weight-Loss Surgery. \"  Current as of: March 28, 2019  Content Version: 12.2  © 5526-5271 [employer]. Care instructions adapted under license by [employer] (which disclaims liability or warranty for this information). If you have questions about a medical condition or this instruction, always ask your healthcare professional. [address] any warranty or liability for your use of this information.          Hemorrhoids: Care Instructions  Your Care Instructions    Hemorrhoids are enlarged veins that develop in the anal canal. Bleeding during bowel movements, itching, swelling, and rectal pain are the most common symptoms. They can be uncomfortable at times, but hemorrhoids rarely are a serious problem. You can treat most hemorrhoids with simple changes to your diet and bowel habits. These changes include eating more fiber and not straining to pass stools. Most hemorrhoids do not need surgery or other treatment unless they are very large and painful or bleed a lot. Follow-up care is a key part of your treatment and safety. Be sure to make and go to all appointments, and call your doctor if you are having problems. It's also a good idea to know your test results and keep a list of the medicines you take. How can you care for yourself at home? · Sit in a few inches of warm water (sitz bath) 3 times a day and after bowel movements. The warm water helps with pain and itching. · Put ice on your anal area several times a day for 10 minutes at a time. Put a thin cloth between the ice and your skin. Follow this by placing a warm, wet towel on the area for another 10 to 20 minutes. · Take pain medicines exactly as directed. ? If the doctor gave you a prescription medicine for pain, take it as prescribed. ? If you are not taking a prescription pain medicine, ask your doctor if you can take an over-the-counter medicine. · Keep the anal area clean, but be gentle. Use water and a fragrance-free soap, such as [place], or use baby wipes or medicated pads, such as Tucks. · Wear cotton underwear and loose clothing to decrease moisture in the anal area. · Eat more fiber. Include foods such as whole-grain breads and cereals, raw vegetables, raw and dried fruits, and beans. · Drink plenty of fluids, enough so that your urine is light yellow or clear like water. If you have kidney, heart, or liver disease and have to limit fluids, talk with your doctor before you increase the amount of fluids you drink. · Use a stool softener that contains bran or psyllium.  You can save money by buying bran or psyllium (available in bulk at most [employer]) and sprinkling it on foods or stirring it into fruit juice. Or you can use a product such as Metamucil or Hydrocil. · Practice healthy bowel habits. ? Go to the bathroom as soon as you have the urge. ? Avoid straining to pass stools. Relax and give yourself time to let things happen naturally. ? Do not hold your breath while passing stools. ? Do not read while sitting on the toilet. Get off the toilet as soon as you have finished. · Take your medicines exactly as prescribed. Call your doctor if you think you are having a problem with your medicine. When should you call for help? Call 911 anytime you think you may need emergency care. For example, call if:    · You pass maroon or very bloody stools.    Call your doctor now or seek immediate medical care if:    · You have increased pain.     · You have increased bleeding.    Watch closely for changes in your health, and be sure to contact your doctor if:    · Your symptoms have not improved after 3 or 4 days. Where can you learn more? Go to http://andreia-liliane.info/. Enter F228 in the search box to learn more about \"Hemorrhoids: Care Instructions. \"  Current as of: November 7, 2018  Content Version: 12.2  © 3934-7365 [employer]. Care instructions adapted under license by [employer] (which disclaims liability or warranty for this information). If you have questions about a medical condition or this instruction, always ask your healthcare professional. [place] [address] any warranty or liability for your use of this information. Constipation: Care Instructions  Your Care Instructions    Constipation means that you have a hard time passing stools (bowel movements). People pass stools from 3 times a day to once every 3 days. What is normal for you may be different. Constipation may occur with pain in the rectum and cramping.  The pain may get worse when you try to pass stools. Sometimes there are small amounts of bright red blood on toilet paper or the surface of stools. This is because of enlarged veins near the rectum (hemorrhoids). A few changes in your diet and lifestyle may help you avoid ongoing constipation. Your doctor may also prescribe medicine to help loosen your stool. Some medicines can cause constipation. These include pain medicines and antidepressants. Tell your doctor about all the medicines you take. Your doctor may want to make a medicine change to ease your symptoms. Follow-up care is a key part of your treatment and safety. Be sure to make and go to all appointments, and call your doctor if you are having problems. It's also a good idea to know your test results and keep a list of the medicines you take. How can you care for yourself at home? · Drink plenty of fluids, enough so that your urine is light yellow or clear like water. If you have kidney, heart, or liver disease and have to limit fluids, talk with your doctor before you increase the amount of fluids you drink. · Include high-fiber foods in your diet each day. These include fruits, vegetables, beans, and whole grains. · Get at least 30 minutes of exercise on most days of the week. Walking is a good choice. You also may want to do other activities, such as running, swimming, cycling, or playing tennis or team sports. · Take a fiber supplement, such as Citrucel or Metamucil, every day. Read and follow all instructions on the label. · Schedule time each day for a bowel movement. A daily routine may help. Take your time having your bowel movement. · Support your feet with a small step stool when you sit on the toilet. This helps flex your hips and places your pelvis in a squatting position. · Your doctor may recommend an over-the-counter laxative to relieve your constipation. Examples are Milk of Magnesia and MiraLax. Read and follow all instructions on the label.  Do not use laxatives on a long-term basis. When should you call for help? Call your doctor now or seek immediate medical care if:    · You have new or worse belly pain.     · You have new or worse nausea or vomiting.     · You have blood in your stools.    Watch closely for changes in your health, and be sure to contact your doctor if:    · Your constipation is getting worse.     · You do not get better as expected. Where can you learn more? Go to http://andreia-liliane.info/. Enter [id number] in the search box to learn more about \"Constipation: Care Instructions. \"  Current as of: June 26, 2019  Content Version: 12.2  © 2382-3767 [employer]. Care instructions adapted under license by [employer] (which disclaims liability or warranty for this information). If you have questions about a medical condition or this instruction, always ask your healthcare professional. [address] any warranty or liability for your use of this information.

## 2019-10-16 NOTE — PROGRESS NOTES
Venecia Miller    CC: Preop Clearance    HPI:     Morbid Obesity:  - Issue for most of his life  - Over time has been getting worse  - Associated HTN, diabetes, GERD, HLD, and OA  - Therapy: Reports having failed management via lifestyle changes alone  - Procedure/Surgery: LGBP  - Date of Procedure/Surgery: Not scheduled  - Surgeon: Dr. Amy Bergt: N/A  - Pre OP Tests: N/A  - History of anesthesia Complications: Denies  - History of abnormal bleeding : Denies  - History of Blood Transfusions: Denies  - History of Latex Allergy: Denies  - Health Care Directive or Living Will: None  - Code Status: Full      Low Vitamin D:  -Vitamin D level was 21.5 on 8/23/2019  -Currently on no vitamin D supplement      Hypertension:  -Taking blood pressure medications as prescribed  -Denies any side effects or issue with BP medication  -Does not check blood pressure at home  -Not following a regular exercise regimen  -Trying to follow dietary recommendations of nutritionist      DMII:  -Taking diabetic medication as prescribed  -Denies any side effects or issue with medication  -Does not check blood sugar at home  -Not following a regular exercise regimen  -Trying to follow dietary recommendations of nutritionist      Anxiety and Depression with Insomnia:  -Taking trazodone as prescribed  -Denies any side effects from the medication  -Reports that medication helps some, but he still has issues      HLD:  -Taking statin as prescribed  -Denies any side effects or issues with statin therapy  -Lipids last check on 8/23/2019 by bariatric surgery  -Elevated total cholesterol had resolved.  Triglycerides and HDL failed to improve  -Not following a regular exercise regimen  -Trying to follow dietary recommendations of nutritionist      ROS: Positive items marked in RED  CON: fever, chills  Eyes: eye pain, vision loss  ENT: ear pain, sore throat  Cardiovascular: CP, palpitations, swelling of lower extremities  Resp: SOB, cough  Lymph/Heme: swollen/enlarged lymph nodes, tender/painful lymph nodes  GI: nausea, vomiting, diarrhea, constipation  SKIN: rash, itching, growths (Anal)  : dysuria, hematuria  MS: arthralgias (lower back), myalgias  Neuro: numbness, weakness  Psych: depression, anxiety    Past Medical History:   Diagnosis Date    Adverse effect of anesthesia     brother- BP drops after surgery-difficult to wake    Back pain     Chest pain     Diabetes (HCC)     Diverticulitis     Erectile dysfunction     Esophageal reflux     GERD (gastroesophageal reflux disease)     Hypertension     Kidney stones     Nephrolithiasis     Osteoarthritis     Pneumonia     Psoriatic arthritis ([address]     Psoriatic arthritis, 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, rheum    Renal calculus, right     Snores     Swallowing difficulty     Ureteral calculi     Urine leukocytes        Past Surgical History:   Procedure Laterality Date    HX CHOLECYSTECTOMY      2002    HX COLECTOMY  2017    HX COLONOSCOPY  02.22.2016    HX UROLOGICAL  06/06/2016    [facility]-Cystoscopy, CYSTOURETHROSCOPY W/ INDWELLING URETERAL STENT INSERTION, Dr Long Level HX UROLOGICAL  05/29/2016    [facility]- Cystoscopy, Right retrograde pyelography, Right 6 x 26 cm double-J ureteral stent placement,Intraoperative fluoro less than 1 hour,Interpretation of Urography, Dr Andrea Holley Left        Family History   Problem Relation Age of Onset    Cancer Mother         Lung    Hypertension Mother     Stroke Mother     Cancer Father         Brain    Heart Attack Father        Social History     Socioeconomic History    Marital status:      Spouse name: Not on file    Number of children: Not on file    Years of education: Not on file    Highest education level: Not on file   Tobacco Use    Smoking status: Never Smoker    Smokeless tobacco: Never Used   Substance and Sexual Activity    Alcohol use: Yes     Alcohol/week: 0.0 standard drinks     Comment: occ    Drug use: No    Sexual activity: Yes     Partners: Female       Allergies   Allergen Reactions    Medrol [Methylprednisolone] Other (comments)     tachycardia  Patient states he had a severe allergic reaction about 15 years ago.  Lipitor [Atorvastatin] Other (comments)     Muscle cramping    Hydrocodone-Acetaminophen Rash         Current Outpatient Medications:     adalimumab (HUMIRA) 40 mg/0.8 mL injection, by SubCUTAneous route once., Disp: , Rfl:     lisinopril (PRINIVIL, ZESTRIL) 40 mg tablet, Take 1 Tab by mouth daily. , Disp: 90 Tab, Rfl: 1    oxyCODONE-acetaminophen (PERCOCET) 7.5-325 mg per tablet, Take  by mouth., Disp: , Rfl:     pravastatin (PRAVACHOL) 40 mg tablet, TAKE 1 TABLET BY MOUTH ONCE DAILY AT BEDTIME FOR CHOLESTEROL, Disp: 90 Tab, Rfl: 0    metFORMIN (GLUCOPHAGE) 1,000 mg tablet, TAKE ONE TABLET BY MOUTH TWICE A DAY WITH MEALS , Disp: 60 Tab, Rfl: 0    gabapentin (NEURONTIN) 600 mg tablet, Take 1 Tab by mouth three (3) times daily. , Disp: 90 Tab, Rfl: 2    traZODone (DESYREL) 100 mg tablet, Take 1 Tab by mouth nightly., Disp: 90 Tab, Rfl: 1    omeprazole (PRILOSEC OTC) 20 mg tablet, Take 20 mg by mouth daily. , Disp: , Rfl:     sildenafil citrate (VIAGRA) 100 mg tablet, take 1 tablet 1 hour prior to intercourse, Disp: 10 Tab, Rfl: 3    ibuprofen (MOTRIN) 800 mg tablet, Take 1 Tab by mouth every six (6) hours as needed for Pain., Disp: 180 Tab, Rfl: 1    cyclobenzaprine (FLEXERIL) 10 mg tablet, Take 1 Tab by mouth three (3) times daily as needed for Muscle Spasm(s). Indications: muscle spasm, Disp: 30 Tab, Rfl: 1    hydrOXYzine HCl (ATARAX) 50 mg tablet, Take 1 Tab by mouth every six (6) hours as needed for Anxiety. , Disp: 180 Tab, Rfl: 1    fluticasone propionate (FLONASE) 50 mcg/actuation nasal spray, 2 Sprays by Both Nostrils route daily. , Disp: 1 Bottle, Rfl: 3    VOLTAREN 1 % gel, Apply  to affected area every six (6) hours. , Disp: 4 g, Rfl: 1    Physical Exam:      /81   Pulse 76   Temp 97.3 °F (36.3 °C) (Oral)   Resp 14   Ht 5' 9\" (1.753 m)   Wt 282 lb (127.9 kg)   SpO2 95%   BMI 41.64 kg/m²     General: obese habitus, NAD, conversant  Eyes: sclera clear bilaterally, no discharge noted, eyelids normal in appearance, PERRLA, EOMI  HENT: NCAT, oropharynx clear, MMM  Neck: supple, midline trachea  Lungs: CTAB, Normal respiratory effort and rate  CV: RRR, no MRGs  ABD: soft, non-tender, non-distended, normal bowel sounds  Rectal: No anal fissures or fistulas noted. One non-thrombosed external hemorrhoid noted near one o'clock position  Ext: no peripheral edema or digital cyanosis noted  Skin: normal temperature, turgor, color, and texture  Psych: alert and oriented to person, place and time, normal affect  Neuro: speech normal, moving all extremities, gait normal, 5/5 strength in upper/lower extremities, CN II-XII grossly intact    EKG (8/23//2019):  Normal sinus rhythm   Normal ECG   No previous ECGs available       Results for Siobhan Gonzalez (MRN [id number]):   Ref. Range 10/16/2019 10:25   Hemoglobin A1c, (calculated) Latest Ref Range: 4.8 - 5.9 % 5.4        Ref. Range 9/5/2019 15:33   WBC Latest Ref Range: 4.0 - 11.0 K/uL 6.5   RBC Latest Ref Range: 3.80 - 5.80 M/uL 4.29   HGB Latest Ref Range: 13.1 - 17.2 g/dL 12.6 (L)   HCT Latest Ref Range: 39.3 - 51.6 % 38.2 (L)   MCV Latest Ref Range: 80 - 95 fL 89   MCH Latest Ref Range: 26 - 34 pg 29   MCHC Latest Ref Range: 31 - 36 g/dL 33   RDW Latest Ref Range: 10.0 - 15.5 % 12.8   PLATELET Latest Ref Range: 140 - 440 K/uL 224   MPV Latest Ref Range: 9.0 - 13.0 fL 11.2   NEUTROPHILS Latest Ref Range: 40 - 75 % 62   Lymphocytes Latest Ref Range: 20 - 45 % 26   MONOCYTES Latest Ref Range: 3 - 12 % 7   EOSINOPHILS Latest Ref Range: 0 - 6 % 4   BASOPHILS Latest Ref Range: 0 - 2 % 1   ABS. NEUTROPHILS Latest Ref Range: 1.8 - 7.7 K/uL 4.0   ABS.  MONOCYTES Latest Ref Range: 0.1 - 1.0 K/uL 0.5   ABS. EOSINOPHILS Latest Ref Range: 0.0 - 0.5 K/uL 0.3   ABS. BASOPHILS Latest Ref Range: 0.0 - 0.2 K/uL 0.1   Sodium Latest Ref Range: 133 - 145 mmol/L 139   Potassium Latest Ref Range: 3.5 - 5.5 mmol/L 4.1   Chloride Latest Ref Range: 98 - 110 mmol/L 101   CO2 Latest Ref Range: 20 - 32 mmol/L 25   Anion gap Latest Units: mmol/L 13.0   Glucose Latest Ref Range: 70 - 99 mg/dL 106 (H)   BUN Latest Ref Range: 6 - 22 mg/dL 18   Creatinine Latest Ref Range: 0.5 - 1.2 mg/dL 0.9   Calcium Latest Ref Range: 8.4 - 10.4 mg/dL 9.2   Bilirubin, total Latest Ref Range: 0.2 - 1.2 mg/dL 0.3   Protein, total Latest Ref Range: 6.4 - 8.3 g/dL 7.6   Albumin Latest Ref Range: 3.5 - 5.0 g/dL 4.6   Globulin Latest Ref Range: 2.0 - 4.0 g/dL 3.0   A-G Ratio Latest Ref Range: 1.1 - 2.6 ratio 1.5   ALT (SGPT) Latest Ref Range: 5 - 40 U/L 29   AST Latest Ref Range: 10 - 37 U/L 20   Alk. phosphatase Latest Ref Range: 25 - 115 U/L 47        Ref. Range 8/23/2019 13:40   Cholesterol, total Latest Ref Range: 110 - 200 mg/dL 181   HDL Cholesterol Latest Ref Range: 40 - 59 mg/dL 33 (L)   CHOLESTEROL/HDL Latest Ref Range: 0.0 - 5.0  5.5   LDL, calculated Latest Ref Range: 50 - 99 mg/dL Pend   VLDL, calculated Latest Ref Range: 8 - 30 mg/dL 93 (H)   LDL,Direct Latest Ref Range: 50 - 99 mg/dL 88   Vitamin B12 Latest Ref Range: 211 - 911 pg/mL 480   Folate Latest Ref Range: >=3.10 ng/mL 11.01   Iron Latest Ref Range: 45 - 160 mcg/dL 79   Ferritin Latest Ref Range: 22 - 322 ng/mL 112   TSH Latest Ref Range: 0.27 - 4.20 mcU/mL 0.88        Ref.  Range 8/23/2019 13:40   VITAMIN D, 25-HYDROXY Latest Ref Range: 32.0 - 100.0 ng/mL 21.5 (L)       Assessment/Plan     Morbid Obesity:  -Currently no findings that would make surgery contraindicated  -Patient is clinically stable for bariatric surgery  -Handout given on preparation for bariatric surgery  -F/U in one month      External Hemorrhoid:  -Counseled on diagnosis and recommended treatment/lifestyle changes  -Started on Anusol-HC regimen  -Handout given on hemorrhoid care and constipation care  -F/U in one month      Low Vitamin D:  -Will avoid high dose vitamin D supplement, as it may further aggravate his constipation.  Trying to avoid constipation due to current issue with hemorrhoid  -Started on 800 units daily of vitamin D  -Advised to discuss preferred units of vitamin D supplement with bariatric surgery  -Handout given on vitamin D  -F/U in one month      Depression and Anxiety with Insomnia:  -Will increase trazodone dose to 150 mg nightly  -Will continue PRN Atarax regimen  -F/U in one month        DMII, Well Controlled:  -At goal HGBA1c of <7%  -Will continue current medication regimen  -Anticipate resolution of diabetes with bariatric surgery  -F/U in one month      HTN:  -BP near goal of <130/80  -Will defer adjusting BP medication at today's visit  -Anticipate improvement of BP after bariatric surgery, which may necessitate reduction of current BP medication regimen  -F/U in one month       HLD:  -On moderate intensity statin therapy as recommended  By ACC/AHA  -Will continue current statin regimen  -Anticipate further improvement in lipids after bariatric surgery  -F/U in one month       Health Maintenance:  -Influenza vaccine administered today        Baljinder Arellano MD  10/16/2019, 9:45 AM

## 2019-10-16 NOTE — PROGRESS NOTES
1. Have you been to the ER, urgent care clinic since your last visit? Hospitalized since your last visit? No    2. Have you seen or consulted any other health care providers outside of the [address] [place] [address] since your last visit? Include any pap smears or colon screening.  No

## 2019-10-17 LAB
AVG GLU, 10930: 109 MG/DL (ref 91–123)
HBA1C MFR BLD HPLC: 5.4 % (ref 4.8–5.9)

## 2019-10-19 ENCOUNTER — DOCUMENTATION ONLY (OUTPATIENT)
Dept: SURGERY | Age: 54
End: 2019-10-19

## 2019-10-19 NOTE — PROGRESS NOTES
[facility] [address]    Patient's Name: Jannette Serrano   Age: 48 y.o. YOB: 1965   Sex: male    Date:  10/14/2019    Session: 6 of  6  Surgeon:  Dr. Carlton Flores    Height: 5'9\" Weight:    283      Lbs. BMI: 61     Starting Weight: 303       Lbs. Do you smoke? No    Alcohol intake:    Number of drinks at a time:  1-2  Number of times a month. 1    Class Guidelines    Guidelines are reviewed with patient at the start of every class. 1. Patient understands that weight loss trial classes must be consecutive. Patient understands if they miss a class, it is their responsibility to contact me to reschedule class. I will reach out to patient after their first no show. 2.  Patient understands the expectations that weight maintenance/weight loss is expected during the classes. Failure to demonstrate changes may result in one extra month of weight loss trial, followed by going back to see the surgeon. 3. Patient is also instructed to be doing their labs, blood work, psych visit, support group and any other test that the surgeon has used while they are working on their weight loss trial.  4. Patient is instructed to bring their education binder to all classes. Changes Made Since Last Class:   Started short walks. Eating Habits and Behaviors      Today we reviewed key diet principles. We talked about patient following a low calorie/low carbohydrate diet while they are in weight loss trials. To achieve this, patient is encouraged to avoid liquid calories, including alcohol, soda, sweet tea, and fruit juices. Patient can cut carbohydrates by trying to stick to meat and vegetables. Patient can also eat eggs, cheese, and good fat, while trying to eliminate starches, such as pasta, rice, crackers, chips, popcorn.     Some of the food-related suggestions included drinking plenty of water or calorie-free beverages prior to their meal.  Patient is encouraged to to fill up on protein first, which is the ultimate fill-me up food. We talked about the importance of eating breakfast and the effects that can happen if one skips meals, which includes eating larger portions, snacking more, and decreasing their metabolism. With the suggestions in the power point, patient will be able to decrease their calories and carbohydrate intake. Patient's current diet habits include:   Eating 3 meals per day consisting of protein drink, yogurt, chicken, fish, vegetables. Physical Activity/Exercise    Comments: We talked about the importance of establishing a work out routine. Patient is currently doing more walking for activity. Behavior Modification       Comments:   Some of the behavior tips that were included in the power point, include being choosy about night time snacking. Patient was encouraged to make the TV a no eating zone and not eat after 7 pm.  Patient is also encouraged to keep a food journal.      I also reminded all patients to make their psychologist appointment and attend at least 1 support group. Goals for the next month include: Increase activity. Decrease portion size. Conclusion:  Patient has completed a 6 month WLT and has managed to lose 20 # by implementing what was taught in class. The patient is cleared to proceed from a nutritional perspective.         Jules Perez RD

## 2019-10-21 NOTE — PROGRESS NOTES
Nutrition Evaluation    Patient's Name: Shannon Gr   Age: 48 y.o. YOB: 1965   Sex: male    Height: 5'9\" Weight: 283 # BMI:  41  Starting Weight:  303#        Smoking Status:  No  Alcohol Intake:  Number of Drinks at a Time: 1-2  Number of Times a month. Changes made during classes include:  Cut down carbs. Eat smaller portions. 3 meals per day. Drink mostltey water. Two things that patient learned during this weight loss trial:  How to count carbs. The importance of water. Summary:  I feel that Shannon Gr has demonstrated appropriate diet changes and is ready to move forward with surgery. Patient has been briefed on the importance of the protein drinks, vitamins, and the transition of the diet stages. Patient understands that the long-term diet will focus on protein and vegetables. Patient understand the effects of carbohydrates after surgery and what reactive hypoglycemia is. Patient is aware that they will be attending pre-op class 2 weeks before surgery and will get more detailed information on the post-op diet guidelines. Patient will see me again at 6 weeks post-op. At this 6 week visit, RD will assess how patient is tolerating soft protein and advance to vegetables, if tolerating soft protein without difficulty. Patient will also see RD again at 9 months post-op. This visit will assess patient's compliance with current protocol, including diet, vitamins, protein shakes, and exercise. Post-op diet guidelines will be reinforced. RD is available for questions and to meet with patient outside of the 6 week and 9 month post-op visit. We spent a lot of time talking about the vitamins. Patient understands the importance of being compliant with the diet protocol and the complications and risks that can occur if they are non-compliant with the nutritional protocol. Patient has attended the June, 2019 Support Group.     Candidate for surgery: Yes    Berl Cough, RD  10/21/2019

## 2019-10-22 ENCOUNTER — TELEPHONE (OUTPATIENT)
Dept: SURGERY | Age: 54
End: 2019-10-22

## 2019-10-22 NOTE — TELEPHONE ENCOUNTER
Called patient to schedule pre op and he stated he will call us back next month. He has to get some things in order.

## 2019-10-23 NOTE — PROGRESS NOTES
As explained to patient at last visit and via E, cannot do LGBP and hernia repair.  Permanent repair only after loses weight and not performing enterotomies

## 2019-10-25 ENCOUNTER — TELEPHONE (OUTPATIENT)
Dept: SURGERY | Age: 54
End: 2019-10-25

## 2019-10-25 NOTE — TELEPHONE ENCOUNTER
Discussed with pt per Dr. Craven Economy cannot do LGBP and hernia repair. Permanent repair only after loses weight and not performing enterotomies. Pt was thankful for call and communicated understanding, notes he cannot schedule his bariatric surgery at this time due to his financial situation. Will call us back to schedule when able to.

## 2019-10-29 ENCOUNTER — TELEPHONE (OUTPATIENT)
Dept: SURGERY | Age: 54
End: 2019-10-29

## 2019-10-29 NOTE — TELEPHONE ENCOUNTER
Patient called back to schedule his pre-op appointment he is ready for surgery now.  11/4 @ 2:30 With Dr. Dairl Kayser

## 2019-11-04 ENCOUNTER — OFFICE VISIT (OUTPATIENT)
Dept: SURGERY | Age: 54
End: 2019-11-04

## 2019-11-04 VITALS
HEIGHT: 69 IN | RESPIRATION RATE: 20 BRPM | BODY MASS INDEX: 42.8 KG/M2 | DIASTOLIC BLOOD PRESSURE: 87 MMHG | TEMPERATURE: 98.6 F | WEIGHT: 289 LBS | SYSTOLIC BLOOD PRESSURE: 125 MMHG | HEART RATE: 92 BPM

## 2019-11-04 DIAGNOSIS — E78.2 MIXED HYPERLIPIDEMIA: ICD-10-CM

## 2019-11-04 DIAGNOSIS — E11.9 CONTROLLED TYPE 2 DIABETES MELLITUS WITHOUT COMPLICATION, WITHOUT LONG-TERM CURRENT USE OF INSULIN (HCC): ICD-10-CM

## 2019-11-04 DIAGNOSIS — I10 ESSENTIAL HYPERTENSION: ICD-10-CM

## 2019-11-04 DIAGNOSIS — E66.01 MORBID OBESITY (HCC): Primary | ICD-10-CM

## 2019-11-04 NOTE — PROGRESS NOTES
Janna Pandya is a 48 y.o. male who presents today with   Chief Complaint   Patient presents with    Morbid Obesity     Pt presents today to discuss surgical options          Body mass index is 42.68 kg/m². 1. Have you been to the ER, urgent care clinic since your last visit? Hospitalized since your last visit? No    2. Have you seen or consulted any other health care providers outside of the [address] [place] [address] since your last visit? Include any pap smears or colon screening.  No

## 2019-11-04 NOTE — PATIENT INSTRUCTIONS
Take the following medications as noted. - If no harper by the medication, take as prescribed until the midnight prior to surgery. - If the medication is circled, take with a sip of water on the morning of surgery prior to reporting to the hospital  - If the medication has a line drawn through it, do not take that medication after starting your liquid diet before surgery  - If the medication has a star beside it, change its dosing as written beside it on the date written beside it. Current Outpatient Medications   Medication Sig Dispense Refill    cholecalciferol (VITAMIN D3) (400 Units /10 mcg) tab tablet Take 2 Tabs by mouth daily. 180 Tab 0    traZODone (DESYREL) 150 mg tablet Take 1 Tab by mouth nightly. 30 Tab 3    fexofenadine (ALLEGRA) 180 mg tablet Take 1 Tab by mouth daily. 90 Tab 3    hydrocortisone (ANUSOL-HC) 2.5 % rectal cream Insert  into rectum four (4) times daily. 30 g 0    sildenafil citrate (VIAGRA) 100 mg tablet take 1 tablet 1 hour prior to intercourse 10 Tab 3    adalimumab (HUMIRA) 40 mg/0.8 mL injection by SubCUTAneous route once.  lisinopril (PRINIVIL, ZESTRIL) 40 mg tablet Take 1 Tab by mouth daily. 90 Tab 1    oxyCODONE-acetaminophen (PERCOCET) 7.5-325 mg per tablet Take  by mouth.  pravastatin (PRAVACHOL) 40 mg tablet TAKE 1 TABLET BY MOUTH ONCE DAILY AT BEDTIME FOR CHOLESTEROL 90 Tab 0    metFORMIN (GLUCOPHAGE) 1,000 mg tablet TAKE ONE TABLET BY MOUTH TWICE A DAY WITH MEALS  60 Tab 0    ibuprofen (MOTRIN) 800 mg tablet Take 1 Tab by mouth every six (6) hours as needed for Pain. 180 Tab 1    cyclobenzaprine (FLEXERIL) 10 mg tablet Take 1 Tab by mouth three (3) times daily as needed for Muscle Spasm(s). Indications: muscle spasm 30 Tab 1    gabapentin (NEURONTIN) 600 mg tablet Take 1 Tab by mouth three (3) times daily. 90 Tab 2    hydrOXYzine HCl (ATARAX) 50 mg tablet Take 1 Tab by mouth every six (6) hours as needed for Anxiety.  180 Tab 1    omeprazole (PRILOSEC OTC) 20 mg tablet Take 20 mg by mouth daily.  fluticasone propionate (FLONASE) 50 mcg/actuation nasal spray 2 Sprays by Both Nostrils route daily. 1 Bottle 3    VOLTAREN 1 % gel Apply  to affected area every six (6) hours.  4 g 1

## 2019-11-04 NOTE — H&P (VIEW-ONLY)
Preop History and Physical Exam: 
 
Genevie Heimlich is a 48 y.o. male who comes into the office today after completing the entirety of the bariatric preoperative protocol satisfactorily. he initially identified obesity at the age of 17 and at age 27 weighed 225lbs. he has tried a variety of unsupervised weight-loss attempts, but has yet to meet with lasting success. Today, the patient is Height: 5' 9\" (175.3 cm) tall, Weight: 131.1 kg (289 lb) lbs for a Body mass index is 42.68 kg/m². It is due to their severe obesity, which is further complicated by adult onset diabetes mellitus, dyslipidemia, hyperlipidemia, GERD, obstructive sleep apnea - clinical and weight related arthopathies  that the patient is now seeking out bariatric surgery, specifically, the gastric bypass Past Medical History:  
Diagnosis Date  Adverse effect of anesthesia   
 brother- BP drops after surgery-difficult to wake  Back pain  Chest pain  Diabetes mellitus, type II ([address]  Diverticulitis  Erectile dysfunction  Esophageal reflux  GERD (gastroesophageal reflux disease)  Hypertension  Kidney stones  Nephrolithiasis  Osteoarthritis  Pneumonia  Psoriatic arthritis ([address]  Psoriatic arthritis, 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[address]   
 dx 5/2019, Dr. Yanely Simon, rheum  Renal calculus, right  Snores  Swallowing difficulty  Ureteral calculi  Urine leukocytes Past Surgical History:  
Procedure Laterality Date  HX CHOLECYSTECTOMY    
 2002  HX COLECTOMY  2017  HX COLONOSCOPY  02.22.2016  HX UROLOGICAL  06/06/2016 [facility]-Cystoscopy, CYSTOURETHROSCOPY W/ INDWELLING URETERAL STENT INSERTION, Dr Najma Prado  HX UROLOGICAL  05/29/2016 [facility]- Cystoscopy, Right retrograde pyelography, Right 6 x 26 cm double-J ureteral stent placement,Intraoperative fluoro less than 1 hour,Interpretation of Urography, Dr Najma Prado  PARTIAL HIP REPLACEMENT Left Current Outpatient Medications on File Prior to Visit Medication Sig Dispense Refill  cholecalciferol (VITAMIN D3) (400 Units /10 mcg) tab tablet Take 2 Tabs by mouth daily. 180 Tab 0  
 traZODone (DESYREL) 150 mg tablet Take 1 Tab by mouth nightly. 30 Tab 3  
 fexofenadine (ALLEGRA) 180 mg tablet Take 1 Tab by mouth daily. 90 Tab 3  
 hydrocortisone (ANUSOL-HC) 2.5 % rectal cream Insert  into rectum four (4) times daily. 30 g 0  
 sildenafil citrate (VIAGRA) 100 mg tablet take 1 tablet 1 hour prior to intercourse 10 Tab 3  
 adalimumab (HUMIRA) 40 mg/0.8 mL injection by SubCUTAneous route once.  lisinopril (PRINIVIL, ZESTRIL) 40 mg tablet Take 1 Tab by mouth daily. 90 Tab 1  
 oxyCODONE-acetaminophen (PERCOCET) 7.5-325 mg per tablet Take  by mouth.  pravastatin (PRAVACHOL) 40 mg tablet TAKE 1 TABLET BY MOUTH ONCE DAILY AT BEDTIME FOR CHOLESTEROL 90 Tab 0  
 metFORMIN (GLUCOPHAGE) 1,000 mg tablet TAKE ONE TABLET BY MOUTH TWICE A DAY WITH MEALS  60 Tab 0  ibuprofen (MOTRIN) 800 mg tablet Take 1 Tab by mouth every six (6) hours as needed for Pain. 180 Tab 1  cyclobenzaprine (FLEXERIL) 10 mg tablet Take 1 Tab by mouth three (3) times daily as needed for Muscle Spasm(s). Indications: muscle spasm 30 Tab 1  
 gabapentin (NEURONTIN) 600 mg tablet Take 1 Tab by mouth three (3) times daily. 90 Tab 2  
 hydrOXYzine HCl (ATARAX) 50 mg tablet Take 1 Tab by mouth every six (6) hours as needed for Anxiety. 180 Tab 1  
 omeprazole (PRILOSEC OTC) 20 mg tablet Take 20 mg by mouth daily.  fluticasone propionate (FLONASE) 50 mcg/actuation nasal spray 2 Sprays by Both Nostrils route daily. 1 Bottle 3  
 VOLTAREN 1 % gel Apply  to affected area every six (6) hours. 4 g 1 No current facility-administered medications on file prior to visit. Allergies Allergen Reactions  Medrol [Methylprednisolone] Other (comments)  
  tachycardia Patient states he had a severe allergic reaction about 15 years ago.  Lipitor [Atorvastatin] Other (comments) Muscle cramping  Hydrocodone-Acetaminophen Rash Social History Tobacco Use  Smoking status: Never Smoker  Smokeless tobacco: Never Used Substance Use Topics  Alcohol use: Yes Alcohol/week: 0.0 standard drinks Comment: occ  Drug use: No  
 
 
Family History Problem Relation Age of Onset  Cancer Mother Lung  Hypertension Mother  Stroke Mother  Cancer Father Brain  Heart Attack Father Family Status Relation Name Status  Mother    Father   Review of Systems:  Positive in BOLD 
  
CONST: Fever, weight loss, fatigue or chills GI: Nausea, vomiting, abdominal pain, change in bowel habits, hematochezia, melena, and GERD INTEG: Dermatitis, abnormal moles HEENT: Recent changes in vision, vertigo, epistaxis, dysphagia and hoarseness CV: Chest pain, palpitations, HTN, edema and varicosities RESP: Cough, shortness of breath, wheezing, hemoptysis, snoring and reactive airway disease : Hematuria, dysuria, frequency, urgency, nocturia and stress urinary incontinence MS: Weakness, joint pain and arthritis - ankle, knee ENDO: Diabetes, thyroid disease, polyuria, polydipsia, polyphagia, poor wound healing, heat intolerance, cold intolerance LYMPH/HEME: Anemia, bruising and history of blood transfusions NEURO: Dizziness, headache, fainting, seizures and stroke PSYCH: Anxiety and depression Physical Exam 
 
Visit Vitals /87 (BP 1 Location: Left arm, BP Patient Position: At rest) Pulse 92 Temp 98.6 °F (37 °C) (Oral) Resp 20 Ht 5' 9\" (1.753 m) Wt 131.1 kg (289 lb) BMI 42.68 kg/m² General: 48 y.o.) male in no acute distress. Morbidly obese in abdomen - android pattern HEENT: Normocephalic, atraumatic, Pupils equal and reactive, nasopharynx clear, oropharynx clear and moist without lesions NECK: Supple, no lymphadenopathy, thyromegaly, carotid bruits or jugular venous distension. trachea midline RESP: Clear to auscultation bilaterally, no wheezes, rhonchi, or rales, normal respiratory excursion CV: Regular rate and rhythm, no murmurs, rubs or gallops. 3+/4 pulses in bilateral dorsalis pedis and posterior tibialis. No distal edema or varicosities. ABD: Soft, nontender, nondistended, normoactive bowel sounds, small umbilical hernia and left flank hernia superolat to LLQ scar, no hepatosplenomegaly, easily palpable costal margins, android distribution, healed LLQ and lap scars Extremities: Warm, well perfused, no tenderness or swelling, normal gait/station Neuro: Sensation and strength grossly intact and symmetrical 
Psych: Alert and oriented to person, place, and time. Studies: LABS: within normal limits except for hypovit D 
EKG: without significant abnormalities NUTRITIONAL EVALUATION has deemed the patient a good candidate for weight loss surgery PSYCHIATRIC EVALUATION has deemed the patient a good candidate for weight loss surgery PCP - cleared Impression: 
 
Mihai Morrow is a 48 y.o. male who is suffering from morbid obesity and comorbidities including adult onset diabetes mellitus, hypertension and hyperlipidemiawho would benefit from bariatric surgery. We've discussed the restrictive and malabsorptive nature of the gastric bypass and compared and contrasted with the sleeve gastrectomy. The patient understands the likelihood of losing approximately 80% of their excess weight in 12 to 18 months. He also understands the risks including but not limited to bleeding, infection, need for reoperation, anastomotic ulcers, leaks and strictures, bowel obstruction secondary to adhesions and internal hernias, DVT, PE, heart attack, stroke, and death.  Patient also understands risks of inadequate weight loss, excess weight loss, vitamin insufficiency, protein malnutrition, excess skin, and loss of hair. We have reviewed the components of a successful postoperative course including requirement for a high protein, low carbohydrate diet, 60 oz a day of zero calorie liquids, daily vitamin supplementation, daily exercise, regular follow-up, and participation in support groups. We have reviewed the preoperative liver shrinking clear liquid diet, as well as reviewed any medication changes required while on the clear liquid diet. In addition, the patient understands that all medications to be taken during the first 8 weeks postoperatively can be taken whole as long as the medication is approximately the size of a Segundo 325 mg aspirin tablet in size. The patient further understands that it is his/her responsibility to review these and verify with their primary care doctor and pharmacist that all medications are of the appropriate size. We will schedule the patient for laparoscopic gastric bypass in the near future. Based on his prior colectomy, he wishes that he be converted to a sleeve gastrectomy rather than convert to open bypass if that occurs.

## 2019-11-04 NOTE — PROGRESS NOTES
Preop History and Physical Exam:    Shannon Gr is a 48 y.o. male who comes into the office today after completing the entirety of the bariatric preoperative protocol satisfactorily. he initially identified obesity at the age of 17 and at age 27 weighed 225lbs. he has tried a variety of unsupervised weight-loss attempts, but has yet to meet with lasting success. Today, the patient is Height: 5' 9\" (175.3 cm) tall, Weight: 131.1 kg (289 lb) lbs for a Body mass index is 42.68 kg/m².   It is due to their severe obesity, which is further complicated by adult onset diabetes mellitus, dyslipidemia, hyperlipidemia, GERD, obstructive sleep apnea - clinical and weight related arthopathies  that the patient is now seeking out bariatric surgery, specifically, the gastric bypass      Past Medical History:   Diagnosis Date    Adverse effect of anesthesia     brother- BP drops after surgery-difficult to wake    Back pain     Chest pain     Diabetes mellitus, type II ([address]     Diverticulitis     Erectile dysfunction     Esophageal reflux     GERD (gastroesophageal reflux disease)     Hypertension     Kidney stones     Nephrolithiasis     Osteoarthritis     Pneumonia     Psoriatic arthritis ([address]     Psoriatic arthritis, 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 Mel Bell, rheum    Renal calculus, right     Snores     Swallowing difficulty     Ureteral calculi     Urine leukocytes        Past Surgical History:   Procedure Laterality Date    HX CHOLECYSTECTOMY      2002    HX COLECTOMY  2017    HX COLONOSCOPY  02.22.2016    HX UROLOGICAL  06/06/2016    [facility]Cystoscopy, CYSTOURETHROSCOPY W/ INDWELLING URETERAL STENT INSERTION, Dr Licha Gamboa HX UROLOGICAL  05/29/2016    [facility]- Cystoscopy, Right retrograde pyelography, Right 6 x 26 cm double-J ureteral stent placement,Intraoperative fluoro less than 1 hour,Interpretation of Urography, Dr Karena Sorensen REPLACEMENT Left        Current Outpatient Medications on File Prior to Visit   Medication Sig Dispense Refill    cholecalciferol (VITAMIN D3) (400 Units /10 mcg) tab tablet Take 2 Tabs by mouth daily. 180 Tab 0    traZODone (DESYREL) 150 mg tablet Take 1 Tab by mouth nightly. 30 Tab 3    fexofenadine (ALLEGRA) 180 mg tablet Take 1 Tab by mouth daily. 90 Tab 3    hydrocortisone (ANUSOL-HC) 2.5 % rectal cream Insert  into rectum four (4) times daily. 30 g 0    sildenafil citrate (VIAGRA) 100 mg tablet take 1 tablet 1 hour prior to intercourse 10 Tab 3    adalimumab (HUMIRA) 40 mg/0.8 mL injection by SubCUTAneous route once.  lisinopril (PRINIVIL, ZESTRIL) 40 mg tablet Take 1 Tab by mouth daily. 90 Tab 1    oxyCODONE-acetaminophen (PERCOCET) 7.5-325 mg per tablet Take  by mouth.  pravastatin (PRAVACHOL) 40 mg tablet TAKE 1 TABLET BY MOUTH ONCE DAILY AT BEDTIME FOR CHOLESTEROL 90 Tab 0    metFORMIN (GLUCOPHAGE) 1,000 mg tablet TAKE ONE TABLET BY MOUTH TWICE A DAY WITH MEALS  60 Tab 0    ibuprofen (MOTRIN) 800 mg tablet Take 1 Tab by mouth every six (6) hours as needed for Pain. 180 Tab 1    cyclobenzaprine (FLEXERIL) 10 mg tablet Take 1 Tab by mouth three (3) times daily as needed for Muscle Spasm(s). Indications: muscle spasm 30 Tab 1    gabapentin (NEURONTIN) 600 mg tablet Take 1 Tab by mouth three (3) times daily. 90 Tab 2    hydrOXYzine HCl (ATARAX) 50 mg tablet Take 1 Tab by mouth every six (6) hours as needed for Anxiety. 180 Tab 1    omeprazole (PRILOSEC OTC) 20 mg tablet Take 20 mg by mouth daily.  fluticasone propionate (FLONASE) 50 mcg/actuation nasal spray 2 Sprays by Both Nostrils route daily. 1 Bottle 3    VOLTAREN 1 % gel Apply  to affected area every six (6) hours. 4 g 1     No current facility-administered medications on file prior to visit. Allergies   Allergen Reactions    Medrol [Methylprednisolone] Other (comments)     tachycardia  Patient states he had a severe allergic reaction about 15 years ago.      Lipitor [Atorvastatin] Other (comments)     Muscle cramping    Hydrocodone-Acetaminophen Rash       Social History     Tobacco Use    Smoking status: Never Smoker    Smokeless tobacco: Never Used   Substance Use Topics    Alcohol use: Yes     Alcohol/week: 0.0 standard drinks     Comment: occ    Drug use: No       Family History   Problem Relation Age of Onset    Cancer Mother         Lung    Hypertension Mother     Stroke Mother     Cancer Father         Brain    Heart Attack Father        Family Status   Relation Name Status    Mother      Father         Review of Systems:  Positive in BOLD     CONST: Fever, weight loss, fatigue or chills  GI: Nausea, vomiting, abdominal pain, change in bowel habits, hematochezia, melena, and GERD   INTEG: Dermatitis, abnormal moles  HEENT: Recent changes in vision, vertigo, epistaxis, dysphagia and hoarseness  CV: Chest pain, palpitations, HTN, edema and varicosities  RESP: Cough, shortness of breath, wheezing, hemoptysis, snoring and reactive airway disease  : Hematuria, dysuria, frequency, urgency, nocturia and stress urinary incontinence   MS: Weakness, joint pain and arthritis - ankle, knee  ENDO: Diabetes, thyroid disease, polyuria, polydipsia, polyphagia, poor wound healing, heat intolerance, cold intolerance  LYMPH/HEME: Anemia, bruising and history of blood transfusions  NEURO: Dizziness, headache, fainting, seizures and stroke  PSYCH: Anxiety and depression    Physical Exam    Visit Vitals  /87 (BP 1 Location: Left arm, BP Patient Position: At rest)   Pulse 92   Temp 98.6 °F (37 °C) (Oral)   Resp 20   Ht 5' 9\" (1.753 m)   Wt 131.1 kg (289 lb)   BMI 42.68 kg/m²       General: 48 y.o.) male in no acute distress.  Morbidly obese in abdomen - android pattern  HEENT: Normocephalic, atraumatic, Pupils equal and reactive, nasopharynx clear, oropharynx clear and moist without lesions  NECK: Supple, no lymphadenopathy, thyromegaly, carotid bruits or jugular venous distension. trachea midline  RESP: Clear to auscultation bilaterally, no wheezes, rhonchi, or rales, normal respiratory excursion  CV: Regular rate and rhythm, no murmurs, rubs or gallops. 3+/4 pulses in bilateral dorsalis pedis and posterior tibialis. No distal edema or varicosities. ABD: Soft, nontender, nondistended, normoactive bowel sounds, small umbilical hernia and left flank hernia superolat to LLQ scar, no hepatosplenomegaly, easily palpable costal margins, android distribution, healed LLQ and lap scars  Extremities: Warm, well perfused, no tenderness or swelling, normal gait/station  Neuro: Sensation and strength grossly intact and symmetrical  Psych: Alert and oriented to person, place, and time. Studies:    LABS: within normal limits except for hypovit D  EKG: without significant abnormalities  NUTRITIONAL EVALUATION has deemed the patient a good candidate for weight loss surgery  PSYCHIATRIC EVALUATION has deemed the patient a good candidate for weight loss surgery  PCP - cleared      Impression:    Aydee Doty is a 48 y.o. male who is suffering from morbid obesity and comorbidities including adult onset diabetes mellitus, hypertension and hyperlipidemiawho would benefit from bariatric surgery. We've discussed the restrictive and malabsorptive nature of the gastric bypass and compared and contrasted with the sleeve gastrectomy. The patient understands the likelihood of losing approximately 80% of their excess weight in 12 to 18 months. He also understands the risks including but not limited to bleeding, infection, need for reoperation, anastomotic ulcers, leaks and strictures, bowel obstruction secondary to adhesions and internal hernias, DVT, PE, heart attack, stroke, and death. Patient also understands risks of inadequate weight loss, excess weight loss, vitamin insufficiency, protein malnutrition, excess skin, and loss of hair.   We have reviewed the components of a successful postoperative course including requirement for a high protein, low carbohydrate diet, 60 oz a day of zero calorie liquids, daily vitamin supplementation, daily exercise, regular follow-up, and participation in support groups. We have reviewed the preoperative liver shrinking clear liquid diet, as well as reviewed any medication changes required while on the clear liquid diet. In addition, the patient understands that all medications to be taken during the first 8 weeks postoperatively can be taken whole as long as the medication is approximately the size of a Segundo 325 mg aspirin tablet in size. The patient further understands that it is his/her responsibility to review these and verify with their primary care doctor and pharmacist that all medications are of the appropriate size. We will schedule the patient for laparoscopic gastric bypass in the near future. Based on his prior colectomy, he wishes that he be converted to a sleeve gastrectomy rather than convert to open bypass if that occurs.

## 2019-11-04 NOTE — LETTER
11/4/19 Patient: Loyda Valencia YOB: 1965 Date of Visit: 11/4/2019 Baljinder Arellano MD 
[facility] [phone] VIA In Basket Dear Baljinder Arellano MD, Thank you for referring Mr. Zia Brennan to [place] [address] for evaluation. My notes for this consultation are attached. If you have questions, please do not hesitate to call me. I look forward to following your patient along with you.  
 
 
Sincerely, 
 
Eliceo Aragon MD

## 2019-11-06 ENCOUNTER — TELEPHONE (OUTPATIENT)
Dept: SURGERY | Age: 54
End: 2019-11-06

## 2019-11-06 NOTE — TELEPHONE ENCOUNTER
Call insurance due to authorization  received had wrong admit date of 11/29 and DOS will be 11/26/19 per [place] Lay will change to correct date of surgery.

## 2019-11-07 DIAGNOSIS — E11.9 TYPE 2 DIABETES MELLITUS WITHOUT COMPLICATION, WITHOUT LONG-TERM CURRENT USE OF INSULIN (HCC): ICD-10-CM

## 2019-11-10 RX ORDER — METFORMIN HYDROCHLORIDE 1000 MG/1
1000 TABLET ORAL 2 TIMES DAILY WITH MEALS
Qty: 180 TAB | Refills: 0 | Status: SHIPPED | OUTPATIENT
Start: 2019-11-10 | End: 2019-11-25

## 2019-11-18 ENCOUNTER — TELEPHONE (OUTPATIENT)
Dept: MEDSURG UNIT | Age: 54
End: 2019-11-18

## 2019-11-18 RX ORDER — ENOXAPARIN SODIUM 100 MG/ML
40 INJECTION SUBCUTANEOUS DAILY
Qty: 7 SYRINGE | Refills: 0 | Status: SHIPPED | OUTPATIENT
Start: 2019-11-18 | End: 2019-11-27

## 2019-11-19 ENCOUNTER — DOCUMENTATION ONLY (OUTPATIENT)
Dept: SURGERY | Age: 54
End: 2019-11-19

## 2019-11-19 NOTE — PROGRESS NOTES
CLINICAL NUTRITION PRE-OPERATIVE EDUCATION    Patient's Name: Liliam Storm   Age: 48 y.o. YOB: 1965   Sex: male    Education & Materials Provided:   - Soft Protein Diet Shopping List  -  Supplemental Resource Guide: MVI, B12, Calcium Citrate, Vitamin D, Vitamin B1,   and iron recommendations  - Protein Supplement Information  - Fluid Requirements/ No Straws  - No Caffeine or Carbonation   - No alcohol              - No Snacks or No Concentrated Sweets     - Exercising   - Support System at [facility] of Support Group meetings. Support System after surgery includes: x     - Key Diet Principles            - Addressed Current Habits/Changes to Make   - Patient has been educated on the liquid diet to begin 1 week prior to surgery. Attendance of support group: Yes    Summary:  Patient has completed visits with the Registered Dietitian. During these nutrition visits, we focused on dietary changes, behavior changes, and the importance of establishing an exercise routine. The diet protocol that patient was prescribed emphasized on low carbohydrates (less than 100 grams per day prior to surgery and 60-80 grams of protein per day). At today's session, patient was educated on the post-op diet protocol. Patient understands the importance of keeping total fat and sugar less than 3 grams per serving. Patient is aware of the transition of the diet stages and is aware that they will be on clear liquids for 7days, followed by soft protein for 5 weeks. Patient understands the body needs ~ 60-70 grams of protein per day. During the liquid phase, patient will need 60 grams of protein from shakes. Once eating soft protein, patient will only need 1 shake per day. Patient is aware of the importance of the vitamins and protein drinks. We spent a lot of time talking about the vitamins.   Patient understands the importance of being compliant with the diet protocol and the complications and risks that can occur if they are non-compliant with the nutritional protocol and non-compliant with the vitamins. Patient has also been educated on the pre-op liquid diet for 1 week. Patient understands that failure to comply in pre-op liquid diet could result in surgery being canceled. Patient's 6 week post-op nutrition appointment has been scheduled. At this 6 week visit, RD will assess how patient is tolerating soft protein and advance to vegetables, if tolerating soft protein without difficulty. Patient will also see RD again at 9 months post-op. This visit will assess patient's compliance with current protocol, including diet, vitamins, protein shakes, and exercise. Post-op diet guidelines will be reinforced. RD is available for questions and to meet with patient outside of the 6 week and 9 month post-op visit. Ok to proceed.      Candidate for surgery: Yes    Jacqueline Macdonald RD  11/19/2019

## 2019-11-25 ENCOUNTER — TELEPHONE (OUTPATIENT)
Dept: SURGERY | Age: 54
End: 2019-11-25

## 2019-11-25 ENCOUNTER — ANESTHESIA EVENT (OUTPATIENT)
Dept: SURGERY | Age: 54
DRG: 403 | End: 2019-11-25
Payer: MEDICAID

## 2019-11-25 NOTE — PERIOP NOTES
PAT - SURGICAL PRE-ADMISSION INSTRUCTIONS    NAME:  Janie Hopper                                                          TODAY'S DATE:  11/25/2019    SURGERY DATE:  11/26/2019                                  SURGERY ARRIVAL TIME:   0530    1. Do NOT eat or drink anything, including candy or gum, after MIDNIGHT on 11/25 , unless you have specific instructions from your Surgeon or Anesthesia Provider to do so. 2. No smoking on the day of surgery. 3. No alcohol 24 hours prior to the day of surgery. 4. No recreational drugs for one week prior to the day of surgery. 5. Leave all valuables, including money/purse, at home. 6. Remove all jewelry, nail polish, makeup (including mascara); no lotions, powders, deodorant, or perfume/cologne/after shave. 7. Glasses/Contact lenses and Dentures may be worn to the hospital.  They will be removed prior to surgery. 8. Call your doctor if symptoms of a cold or illness develop within 24 ours prior to surgery. 9. AN ADULT MUST DRIVE YOU HOME AFTER OUTPATIENT SURGERY. 10. If you are having an OUTPATIENT procedure, please make arrangements for a responsible adult to be with you for 24 hours after your surgery. 11. If you are admitted to the hospital, you will be assigned to a bed after surgery is complete. Normally a family member will not be able to see you until you are in your assigned bed. 15. Family is encouraged to accompany you to the hospital.  We ask visitors in the treatment area to be limited to ONE person at a time to ensure patient privacy. EXCEPTIONS WILL BE MADE AS NEEDED. 15. Children under 12 are discouraged from entering the treatment area and need to be supervised by an adult when in the waiting room. Special Instructions:     Take these medications the morning of surgery with a sip of water:  May take pain medication    Patient Prep:    shower with anti-bacterial soap    These surgical instructions were reviewed with Marni Wiggins during the PAT phone call. Directions: On the morning of surgery, please go to the [facility]. Enter the building from the [facility] entrance, 1st floor (next to the Emergency Room entrance). Take the elevator to the 2nd floor. Sign in at the Registration Desk.     If you have any questions and/or concerns, please do not hesitate to call:  (Prior to the day of surgery)  [facility] unit:  [phone]  (Day of surgery)  [facility] unit:  [phone]

## 2019-11-25 NOTE — TELEPHONE ENCOUNTER
Patient called and lvm that he wanted to change his surgery to sleeve for tomorrow. Called patient and told him he needs to discuss that with Dr. Sadi Paz as it is 5:00 and he is not in office and his surgery is in the morning. Patient has had previous surgery and Dr. Sadi Paz has had conversation about what is will be determining doing surgery. It stated never mind I will just do the bypass. I told patient again just talk with him in the morning and he said that is what he was going to do anyway.

## 2019-11-26 ENCOUNTER — ANESTHESIA (OUTPATIENT)
Dept: SURGERY | Age: 54
DRG: 403 | End: 2019-11-26
Payer: MEDICAID

## 2019-11-26 ENCOUNTER — HOSPITAL ENCOUNTER (INPATIENT)
Age: 54
LOS: 1 days | Discharge: HOME | DRG: 403 | End: 2019-11-27
Attending: SURGERY | Admitting: SURGERY
Payer: MEDICAID

## 2019-11-26 DIAGNOSIS — Z98.84 S/P GASTRIC BYPASS: Primary | ICD-10-CM

## 2019-11-26 LAB
GLUCOSE BLD STRIP.AUTO-MCNC: 167 MG/DL (ref 70–110)
GLUCOSE BLD STRIP.AUTO-MCNC: 89 MG/DL (ref 70–110)

## 2019-11-26 PROCEDURE — 82962 GLUCOSE BLOOD TEST: CPT

## 2019-11-26 PROCEDURE — 77030005518 HC CATH URETH FOL 2W BARD -B: Performed by: SURGERY

## 2019-11-26 PROCEDURE — 77030016151 HC PROTCTR LNS DFOG COVD -B: Performed by: SURGERY

## 2019-11-26 PROCEDURE — 74011000250 HC RX REV CODE- 250: Performed by: SURGERY

## 2019-11-26 PROCEDURE — 77030002933 HC SUT MCRYL J&J -A: Performed by: SURGERY

## 2019-11-26 PROCEDURE — 77030036732 HC RELD STPLR VASC J&J -F: Performed by: SURGERY

## 2019-11-26 PROCEDURE — 77030039266 HC ADH SKN EXOFIN S2SG -A: Performed by: SURGERY

## 2019-11-26 PROCEDURE — 77030008574 HC TBNG SUC IRR STRY -B: Performed by: SURGERY

## 2019-11-26 PROCEDURE — 77030008598 HC TRCR ENDOSC BLDLS J&J -B: Performed by: SURGERY

## 2019-11-26 PROCEDURE — 77030027876 HC STPLR ENDOSC FLX PWR J&J -G1: Performed by: SURGERY

## 2019-11-26 PROCEDURE — 77030008683 HC TU ET CUF COVD -A: Performed by: ANESTHESIOLOGY

## 2019-11-26 PROCEDURE — 0D164ZA BYPASS STOMACH TO JEJUNUM, PERCUTANEOUS ENDOSCOPIC APPROACH: ICD-10-PCS | Performed by: SURGERY

## 2019-11-26 PROCEDURE — 74011636637 HC RX REV CODE- 636/637: Performed by: NURSE ANESTHETIST, CERTIFIED REGISTERED

## 2019-11-26 PROCEDURE — 77030035051: Performed by: SURGERY

## 2019-11-26 PROCEDURE — 74011250636 HC RX REV CODE- 250/636: Performed by: SURGERY

## 2019-11-26 PROCEDURE — 77030033639 HC SHR ENDO COAG HARM 36 J&J -E: Performed by: SURGERY

## 2019-11-26 PROCEDURE — 74011250636 HC RX REV CODE- 250/636: Performed by: NURSE ANESTHETIST, CERTIFIED REGISTERED

## 2019-11-26 PROCEDURE — 74011000250 HC RX REV CODE- 250: Performed by: NURSE ANESTHETIST, CERTIFIED REGISTERED

## 2019-11-26 PROCEDURE — 65270000029 HC RM PRIVATE

## 2019-11-26 PROCEDURE — 77030008477 HC STYL SATN SLP COVD -A: Performed by: ANESTHESIOLOGY

## 2019-11-26 PROCEDURE — 77030031139 HC SUT VCRL2 J&J -A: Performed by: SURGERY

## 2019-11-26 PROCEDURE — 77030032490 HC SLV COMPR SCD KNE COVD -B: Performed by: SURGERY

## 2019-11-26 PROCEDURE — 77030009968 HC RELD STPLR ENDOSC J&J -D: Performed by: SURGERY

## 2019-11-26 PROCEDURE — 77030013079 HC BLNKT BAIR HGGR 3M -A: Performed by: ANESTHESIOLOGY

## 2019-11-26 PROCEDURE — 77030027491 HC SHR ENDOSC COVD -B: Performed by: SURGERY

## 2019-11-26 PROCEDURE — 0WQF4ZZ REPAIR ABDOMINAL WALL, PERCUTANEOUS ENDOSCOPIC APPROACH: ICD-10-PCS | Performed by: SURGERY

## 2019-11-26 PROCEDURE — 77030008437 HC REINF STRP REINF SEMGD WLGO -C: Performed by: SURGERY

## 2019-11-26 PROCEDURE — 77030002996 HC SUT SLK J&J -A: Performed by: SURGERY

## 2019-11-26 PROCEDURE — 76060000036 HC ANESTHESIA 2.5 TO 3 HR: Performed by: SURGERY

## 2019-11-26 PROCEDURE — 76210000000 HC OR PH I REC 2 TO 2.5 HR: Performed by: SURGERY

## 2019-11-26 PROCEDURE — 77030018831 HC SOL IRR H20 BAXT -A: Performed by: SURGERY

## 2019-11-26 PROCEDURE — 76010000132 HC OR TIME 2.5 TO 3 HR: Performed by: SURGERY

## 2019-11-26 PROCEDURE — 74011250637 HC RX REV CODE- 250/637: Performed by: SURGERY

## 2019-11-26 PROCEDURE — 77030008603 HC TRCR ENDOSC EPATH J&J -C: Performed by: SURGERY

## 2019-11-26 RX ORDER — DIPHENHYDRAMINE HYDROCHLORIDE 50 MG/ML
25 INJECTION, SOLUTION INTRAMUSCULAR; INTRAVENOUS
Status: DISCONTINUED | OUTPATIENT
Start: 2019-11-26 | End: 2019-11-27 | Stop reason: HOSPADM

## 2019-11-26 RX ORDER — LANOLIN ALCOHOL/MO/W.PET/CERES
1000 CREAM (GRAM) TOPICAL DAILY
COMMUNITY

## 2019-11-26 RX ORDER — LISINOPRIL 40 MG/1
40 TABLET ORAL DAILY
Status: DISCONTINUED | OUTPATIENT
Start: 2019-11-27 | End: 2019-11-27 | Stop reason: HOSPADM

## 2019-11-26 RX ORDER — ONDANSETRON 2 MG/ML
4 INJECTION INTRAMUSCULAR; INTRAVENOUS ONCE
Status: COMPLETED | OUTPATIENT
Start: 2019-11-26 | End: 2019-11-26

## 2019-11-26 RX ORDER — HYDROMORPHONE HYDROCHLORIDE 1 MG/ML
INJECTION, SOLUTION INTRAMUSCULAR; INTRAVENOUS; SUBCUTANEOUS AS NEEDED
Status: DISCONTINUED | OUTPATIENT
Start: 2019-11-26 | End: 2019-11-26 | Stop reason: HOSPADM

## 2019-11-26 RX ORDER — FENTANYL CITRATE 50 UG/ML
50 INJECTION, SOLUTION INTRAMUSCULAR; INTRAVENOUS AS NEEDED
Status: DISCONTINUED | OUTPATIENT
Start: 2019-11-26 | End: 2019-11-26 | Stop reason: HOSPADM

## 2019-11-26 RX ORDER — MIDAZOLAM HYDROCHLORIDE 1 MG/ML
INJECTION, SOLUTION INTRAMUSCULAR; INTRAVENOUS AS NEEDED
Status: DISCONTINUED | OUTPATIENT
Start: 2019-11-26 | End: 2019-11-26 | Stop reason: HOSPADM

## 2019-11-26 RX ORDER — ENOXAPARIN SODIUM 100 MG/ML
40 INJECTION SUBCUTANEOUS DAILY
Status: DISCONTINUED | OUTPATIENT
Start: 2019-11-27 | End: 2019-11-27 | Stop reason: HOSPADM

## 2019-11-26 RX ORDER — GLUCOSAMINE SULFATE 1500 MG
POWDER IN PACKET (EA) ORAL DAILY
COMMUNITY

## 2019-11-26 RX ORDER — HYDROMORPHONE HYDROCHLORIDE 1 MG/ML
1 INJECTION, SOLUTION INTRAMUSCULAR; INTRAVENOUS; SUBCUTANEOUS
Status: DISCONTINUED | OUTPATIENT
Start: 2019-11-26 | End: 2019-11-27 | Stop reason: HOSPADM

## 2019-11-26 RX ORDER — INSULIN LISPRO 100 [IU]/ML
INJECTION, SOLUTION INTRAVENOUS; SUBCUTANEOUS ONCE
Status: COMPLETED | OUTPATIENT
Start: 2019-11-26 | End: 2019-11-26

## 2019-11-26 RX ORDER — ACETAMINOPHEN 325 MG/1
650 TABLET ORAL EVERY 6 HOURS
Status: DISCONTINUED | OUTPATIENT
Start: 2019-11-26 | End: 2019-11-26

## 2019-11-26 RX ORDER — PEDIATRIC MULTIVITAMIN NO.17
1 TABLET,CHEWABLE ORAL DAILY
COMMUNITY

## 2019-11-26 RX ORDER — OXYCODONE HYDROCHLORIDE 5 MG/1
5 TABLET ORAL
Status: DISCONTINUED | OUTPATIENT
Start: 2019-11-26 | End: 2019-11-27 | Stop reason: HOSPADM

## 2019-11-26 RX ORDER — FENTANYL CITRATE 50 UG/ML
INJECTION, SOLUTION INTRAMUSCULAR; INTRAVENOUS AS NEEDED
Status: DISCONTINUED | OUTPATIENT
Start: 2019-11-26 | End: 2019-11-26 | Stop reason: HOSPADM

## 2019-11-26 RX ORDER — VECURONIUM BROMIDE FOR INJECTION 1 MG/ML
INJECTION, POWDER, LYOPHILIZED, FOR SOLUTION INTRAVENOUS AS NEEDED
Status: DISCONTINUED | OUTPATIENT
Start: 2019-11-26 | End: 2019-11-26 | Stop reason: HOSPADM

## 2019-11-26 RX ORDER — GABAPENTIN 300 MG/1
300 CAPSULE ORAL 3 TIMES DAILY
Status: DISCONTINUED | OUTPATIENT
Start: 2019-11-26 | End: 2019-11-27 | Stop reason: HOSPADM

## 2019-11-26 RX ORDER — SODIUM CHLORIDE, SODIUM LACTATE, POTASSIUM CHLORIDE, CALCIUM CHLORIDE 600; 310; 30; 20 MG/100ML; MG/100ML; MG/100ML; MG/100ML
150 INJECTION, SOLUTION INTRAVENOUS CONTINUOUS
Status: DISCONTINUED | OUTPATIENT
Start: 2019-11-26 | End: 2019-11-27 | Stop reason: HOSPADM

## 2019-11-26 RX ORDER — SUCCINYLCHOLINE CHLORIDE 100 MG/5ML
SYRINGE (ML) INTRAVENOUS AS NEEDED
Status: DISCONTINUED | OUTPATIENT
Start: 2019-11-26 | End: 2019-11-26 | Stop reason: HOSPADM

## 2019-11-26 RX ORDER — NALOXONE HYDROCHLORIDE 0.4 MG/ML
0.4 INJECTION, SOLUTION INTRAMUSCULAR; INTRAVENOUS; SUBCUTANEOUS AS NEEDED
Status: DISCONTINUED | OUTPATIENT
Start: 2019-11-26 | End: 2019-11-27 | Stop reason: HOSPADM

## 2019-11-26 RX ORDER — MAGNESIUM SULFATE 100 %
4 CRYSTALS MISCELLANEOUS AS NEEDED
Status: DISCONTINUED | OUTPATIENT
Start: 2019-11-26 | End: 2019-11-26 | Stop reason: HOSPADM

## 2019-11-26 RX ORDER — INSULIN LISPRO 100 [IU]/ML
INJECTION, SOLUTION INTRAVENOUS; SUBCUTANEOUS ONCE
Status: DISCONTINUED | OUTPATIENT
Start: 2019-11-26 | End: 2019-11-26 | Stop reason: HOSPADM

## 2019-11-26 RX ORDER — HYOSCYAMINE SULFATE 0.12 MG/1
0.12 TABLET, ORALLY DISINTEGRATING ORAL
Status: DISCONTINUED | OUTPATIENT
Start: 2019-11-26 | End: 2019-11-27 | Stop reason: HOSPADM

## 2019-11-26 RX ORDER — ACETAMINOPHEN 325 MG/1
650 TABLET ORAL EVERY 6 HOURS
Status: DISCONTINUED | OUTPATIENT
Start: 2019-11-26 | End: 2019-11-27 | Stop reason: HOSPADM

## 2019-11-26 RX ORDER — ENOXAPARIN SODIUM 100 MG/ML
40 INJECTION SUBCUTANEOUS ONCE
Status: COMPLETED | OUTPATIENT
Start: 2019-11-26 | End: 2019-11-26

## 2019-11-26 RX ORDER — ACETAMINOPHEN 650 MG/1
650 SUPPOSITORY RECTAL ONCE
Status: COMPLETED | OUTPATIENT
Start: 2019-11-26 | End: 2019-11-26

## 2019-11-26 RX ORDER — ONDANSETRON 2 MG/ML
INJECTION INTRAMUSCULAR; INTRAVENOUS AS NEEDED
Status: DISCONTINUED | OUTPATIENT
Start: 2019-11-26 | End: 2019-11-26 | Stop reason: HOSPADM

## 2019-11-26 RX ORDER — ONDANSETRON 2 MG/ML
4 INJECTION INTRAMUSCULAR; INTRAVENOUS
Status: DISCONTINUED | OUTPATIENT
Start: 2019-11-26 | End: 2019-11-27 | Stop reason: HOSPADM

## 2019-11-26 RX ORDER — LIDOCAINE HYDROCHLORIDE 20 MG/ML
INJECTION, SOLUTION EPIDURAL; INFILTRATION; INTRACAUDAL; PERINEURAL AS NEEDED
Status: DISCONTINUED | OUTPATIENT
Start: 2019-11-26 | End: 2019-11-26 | Stop reason: HOSPADM

## 2019-11-26 RX ORDER — PROPOFOL 10 MG/ML
INJECTION, EMULSION INTRAVENOUS AS NEEDED
Status: DISCONTINUED | OUTPATIENT
Start: 2019-11-26 | End: 2019-11-26 | Stop reason: HOSPADM

## 2019-11-26 RX ORDER — LIDOCAINE HYDROCHLORIDE 10 MG/ML
0.1 INJECTION, SOLUTION EPIDURAL; INFILTRATION; INTRACAUDAL; PERINEURAL AS NEEDED
Status: DISCONTINUED | OUTPATIENT
Start: 2019-11-26 | End: 2019-11-26 | Stop reason: HOSPADM

## 2019-11-26 RX ORDER — KETOROLAC TROMETHAMINE 30 MG/ML
15 INJECTION, SOLUTION INTRAMUSCULAR; INTRAVENOUS EVERY 6 HOURS
Status: COMPLETED | OUTPATIENT
Start: 2019-11-26 | End: 2019-11-27

## 2019-11-26 RX ORDER — SODIUM CHLORIDE, SODIUM LACTATE, POTASSIUM CHLORIDE, CALCIUM CHLORIDE 600; 310; 30; 20 MG/100ML; MG/100ML; MG/100ML; MG/100ML
125 INJECTION, SOLUTION INTRAVENOUS CONTINUOUS
Status: DISCONTINUED | OUTPATIENT
Start: 2019-11-26 | End: 2019-11-26 | Stop reason: HOSPADM

## 2019-11-26 RX ORDER — SODIUM CHLORIDE, SODIUM LACTATE, POTASSIUM CHLORIDE, CALCIUM CHLORIDE 600; 310; 30; 20 MG/100ML; MG/100ML; MG/100ML; MG/100ML
25 INJECTION, SOLUTION INTRAVENOUS CONTINUOUS
Status: DISCONTINUED | OUTPATIENT
Start: 2019-11-26 | End: 2019-11-26 | Stop reason: HOSPADM

## 2019-11-26 RX ADMIN — FAMOTIDINE 20 MG: 10 INJECTION, SOLUTION INTRAVENOUS at 06:43

## 2019-11-26 RX ADMIN — MIDAZOLAM 2 MG: 1 INJECTION INTRAMUSCULAR; INTRAVENOUS at 07:33

## 2019-11-26 RX ADMIN — KETOROLAC TROMETHAMINE 15 MG: 30 INJECTION, SOLUTION INTRAMUSCULAR at 22:36

## 2019-11-26 RX ADMIN — GABAPENTIN 300 MG: 300 CAPSULE ORAL at 16:57

## 2019-11-26 RX ADMIN — HYOSCYAMINE SULFATE 0.12 MG: 0.12 TABLET, ORALLY DISINTEGRATING ORAL at 10:59

## 2019-11-26 RX ADMIN — ENOXAPARIN SODIUM 40 MG: 40 INJECTION, SOLUTION INTRAVENOUS; SUBCUTANEOUS at 06:46

## 2019-11-26 RX ADMIN — FENTANYL CITRATE 50 MCG: 50 INJECTION, SOLUTION INTRAMUSCULAR; INTRAVENOUS at 10:31

## 2019-11-26 RX ADMIN — VECURONIUM BROMIDE FOR INJECTION 1 MG: 1 INJECTION, POWDER, LYOPHILIZED, FOR SOLUTION INTRAVENOUS at 08:59

## 2019-11-26 RX ADMIN — HYDROMORPHONE HYDROCHLORIDE 1 MG: 1 INJECTION, SOLUTION INTRAMUSCULAR; INTRAVENOUS; SUBCUTANEOUS at 08:32

## 2019-11-26 RX ADMIN — OXYCODONE HYDROCHLORIDE 5 MG: 5 TABLET ORAL at 12:48

## 2019-11-26 RX ADMIN — VECURONIUM BROMIDE FOR INJECTION 1 MG: 1 INJECTION, POWDER, LYOPHILIZED, FOR SOLUTION INTRAVENOUS at 08:32

## 2019-11-26 RX ADMIN — SODIUM CHLORIDE, SODIUM LACTATE, POTASSIUM CHLORIDE, AND CALCIUM CHLORIDE: 600; 310; 30; 20 INJECTION, SOLUTION INTRAVENOUS at 08:11

## 2019-11-26 RX ADMIN — FENTANYL CITRATE 100 MCG: 50 INJECTION, SOLUTION INTRAMUSCULAR; INTRAVENOUS at 07:38

## 2019-11-26 RX ADMIN — ACETAMINOPHEN 650 MG: 325 TABLET, FILM COATED ORAL at 20:17

## 2019-11-26 RX ADMIN — SUGAMMADEX 400 MG: 100 INJECTION, SOLUTION INTRAVENOUS at 09:53

## 2019-11-26 RX ADMIN — LIDOCAINE HYDROCHLORIDE 50 MG: 20 INJECTION, SOLUTION INTRAVENOUS at 07:38

## 2019-11-26 RX ADMIN — VECURONIUM BROMIDE FOR INJECTION 4 MG: 1 INJECTION, POWDER, LYOPHILIZED, FOR SOLUTION INTRAVENOUS at 07:44

## 2019-11-26 RX ADMIN — HYOSCYAMINE SULFATE 0.12 MG: 0.12 TABLET, ORALLY DISINTEGRATING ORAL at 16:57

## 2019-11-26 RX ADMIN — ONDANSETRON 4 MG: 2 INJECTION INTRAMUSCULAR; INTRAVENOUS at 10:22

## 2019-11-26 RX ADMIN — PROPOFOL 200 MG: 10 INJECTION, EMULSION INTRAVENOUS at 07:38

## 2019-11-26 RX ADMIN — FENTANYL CITRATE 50 MCG: 50 INJECTION, SOLUTION INTRAMUSCULAR; INTRAVENOUS at 10:46

## 2019-11-26 RX ADMIN — Medication 100 MG: at 07:38

## 2019-11-26 RX ADMIN — INSULIN LISPRO 3 UNITS: 100 INJECTION, SOLUTION INTRAVENOUS; SUBCUTANEOUS at 11:00

## 2019-11-26 RX ADMIN — ONDANSETRON 4 MG: 2 INJECTION INTRAMUSCULAR; INTRAVENOUS at 18:03

## 2019-11-26 RX ADMIN — KETOROLAC TROMETHAMINE 15 MG: 30 INJECTION, SOLUTION INTRAMUSCULAR at 16:57

## 2019-11-26 RX ADMIN — CEFAZOLIN 3 G: 1 INJECTION, POWDER, FOR SOLUTION INTRAMUSCULAR; INTRAVENOUS; PARENTERAL at 07:42

## 2019-11-26 RX ADMIN — ONDANSETRON 4 MG: 2 SOLUTION INTRAMUSCULAR; INTRAVENOUS at 07:46

## 2019-11-26 RX ADMIN — SODIUM CHLORIDE, SODIUM LACTATE, POTASSIUM CHLORIDE, AND CALCIUM CHLORIDE 25 ML/HR: 600; 310; 30; 20 INJECTION, SOLUTION INTRAVENOUS at 06:46

## 2019-11-26 RX ADMIN — ACETAMINOPHEN 650 MG: 325 TABLET, FILM COATED ORAL at 13:50

## 2019-11-26 RX ADMIN — FENTANYL CITRATE 50 MCG: 50 INJECTION, SOLUTION INTRAMUSCULAR; INTRAVENOUS at 11:50

## 2019-11-26 RX ADMIN — ONDANSETRON 3 MG: 2 INJECTION INTRAMUSCULAR; INTRAVENOUS at 12:48

## 2019-11-26 RX ADMIN — FENTANYL CITRATE 50 MCG: 50 INJECTION, SOLUTION INTRAMUSCULAR; INTRAVENOUS at 11:38

## 2019-11-26 RX ADMIN — HYDROMORPHONE HYDROCHLORIDE 1 MG: 1 INJECTION, SOLUTION INTRAMUSCULAR; INTRAVENOUS; SUBCUTANEOUS at 13:50

## 2019-11-26 RX ADMIN — KETOROLAC TROMETHAMINE 15 MG: 30 INJECTION, SOLUTION INTRAMUSCULAR at 10:36

## 2019-11-26 RX ADMIN — VECURONIUM BROMIDE FOR INJECTION 1 MG: 1 INJECTION, POWDER, LYOPHILIZED, FOR SOLUTION INTRAVENOUS at 07:38

## 2019-11-26 RX ADMIN — GABAPENTIN 300 MG: 300 CAPSULE ORAL at 22:36

## 2019-11-26 NOTE — ANESTHESIA PREPROCEDURE EVALUATION
Relevant Problems   No relevant active problems       Anesthetic History   No history of anesthetic complications            Review of Systems / Medical History  Patient summary reviewed and pertinent labs reviewed    Pulmonary  Within defined limits                 Neuro/Psych         Psychiatric history     Cardiovascular    Hypertension: well controlled              Exercise tolerance: >4 METS     GI/Hepatic/Renal     GERD: well controlled    Renal disease: stones       Endo/Other    Diabetes: well controlled, type 2    Morbid obesity and arthritis     Other Findings              Physical Exam    Airway  Mallampati: III  TM Distance: 4 - 6 cm  Neck ROM: normal range of motion   Mouth opening: Normal     Cardiovascular    Rhythm: regular  Rate: normal         Dental  No notable dental hx       Pulmonary  Breath sounds clear to auscultation               Abdominal  GI exam deferred       Other Findings            Anesthetic Plan    ASA: 3  Anesthesia type: general          Induction: Intravenous  Anesthetic plan and risks discussed with: Patient

## 2019-11-26 NOTE — INTERVAL H&P NOTE
H&P Update: 
Anu Patiño was seen and examined. History and physical has been reviewed. The patient has been examined.  There have been no significant clinical changes since the completion of the originally dated History and Physical.

## 2019-11-26 NOTE — ANESTHESIA POSTPROCEDURE EVALUATION
Procedure(s):  LAPAROSCOPIC padmini-en-y  Gastric BYPASS.     general    Anesthesia Post Evaluation      Multimodal analgesia: multimodal analgesia used between 6 hours prior to anesthesia start to PACU discharge  Patient location during evaluation: bedside  Patient participation: complete - patient participated  Level of consciousness: awake  Pain management: adequate  Airway patency: patent  Anesthetic complications: no  Cardiovascular status: stable  Respiratory status: acceptable  Hydration status: acceptable  Post anesthesia nausea and vomiting:  controlled      Vitals Value Taken Time   /76 11/26/2019 12:16 PM   Temp 36.3 °C (97.4 °F) 11/26/2019 11:46 AM   Pulse 80 11/26/2019 12:30 PM   Resp 17 11/26/2019 12:30 PM   SpO2 97 % 11/26/2019 12:30 PM

## 2019-11-26 NOTE — OP NOTES
DATE: 11/26/2019   HAS DEMANDED TO CLARIFY WHETHER INTERNS OR RESIDENTS ARE AVAILABLE FOR ASSISTING FOR ANY CASE SUBMITTED TO THEM FOR PAYMENT. TO CLARIFY, I DO NOT TEACH RESIDENTS NOR INTERNS. THEY ARE NOT NOW, IN THE PAST NOR AT ANY ANTICIPATED POINT IN THE FUTURE, AVAILABLE TO PROVIDE ASSISTANCE IN THE OPERATING ROOM FOR SURGICAL CASES THAT I PERFORM. PREOPERATIVE DIAGNOSIS: Clinically severe obesity, body mass index of 43,   comorbidities of adult onset diabetes mellitus, dyslipidemia, hyperlipidemia, GERD, obstructive sleep apnea - clinical and weight related arthopathies  . POSTOPERATIVE DIAGNOSIS: Clinically severe obesity, body mass index of 43,   comorbidities of adult onset diabetes mellitus, dyslipidemia, hyperlipidemia, GERD, obstructive sleep apnea - clinical and weight related arthopathies    PROCEDURES: Laparoscopic Filippo-en-Y gastric bypass with 413 cm retrocolic   retrogastric Filippo limb, 40 cm biliopancreatic limb, 15 ml    tubularized gastric pouch applied to the lesser curvature of stomach  SURGEON: Dr. Calixto Woods. Rosemary Adam MD, FACS   ASSISTANT: Yassine Dutta SA  ANESTHESIA: General endotracheal anesthesia. Local anesthetic mixture 1%   lidocaine, 0.5% Marcaine, with epinephrine injected locally utilizing 50  mL. FINDINGS: Known LLQ hernia and umbilical hernia. Narrow neck on umbilical - repaired with #2 vicryl  SPECIMEN: None  IMPLANTS: * No implants in log *  ESTIMATED BLOOD LOSS: 25 ml  FLUIDS: 1700 ml   URINE OUTPUT: 250 ml   DRAIN: None  COMPLICATIONS: None  OPERATIVE START TIME: 0803  OPERATIVE COMPLETION TIME: 1288    DESCRIPTION OF PROCEDURE: The patient was prepped and draped in standard sterile fashion after being placed under general anesthetic. A site was selected superior to the umbilicus in the midline. This area was incised. A 5  mm Optical trocar was placed over the laparoscope and directed into the abdominal cavity using Optiview technique.  Abdomen was insufflated to 15 mmHg and surveyed. There was no evidence of injury from the entry. Additional trocars were then placed. My assistant place one 5 mm trocar was placed in the anterior axillary line left upper quadrant approximately 3 fingerbreadths below the costal margin and another 12 mm trocar was placed in the lateral portion of the left rectus sheath approximately 3 fingerbreadths lateral to the umbilical trocar. I then placed another 12 mm trocar was placed approximately 4 fingerbreadths lateral to the umbilical trocar in the  lateral portion of right rectus sheath. All were placed after incising with scalpel, all were placed using blunt dissecting technique. There was no evidence of injury from the entries. Instrument were placed in the abdominal cavity. Adhesions to an umbilical and LLQ trocar hernia were taken down with Harmonic scalpel. The omentum and transverse colon were retracted superiorly, exposing ligament of Treitz. The small bowel was measured 40 cm distal to the ligament of Treitz, divided at this level with A 60 mm [place] stapler. Harmonic dissection was used to continue the division to the base of the mesentery, confirming preservation of blood flow to the small bowel above and below the staple line prior to firing. Then, from the distal staple line, the Filippo limb was measured 150 cm distal and an antimesenteric enterotomy was made. Another antimesenteric enterotomy was made just proximal to the proximal staple line of the biliopancreatic limb. Through these enterotomies, a 60 mm [place] stapler was placed into the bowel, clamped on the antimesenteric borders and fired to form a stapled side-to-side anastomosis. The remaining enterotomy was closed in a running inverting fashion with 3-0 Vicryl suture. The mesenteric defect was then closed with running 2-0 silk suture, extending on the bowel wall in a running Lembert fashion for second layer closure of the enterotomy.      At this point, attention was directed to the transverse mesocolon. While my assistant exposed the bare area of the transverse mesentery above the ligament of Trietz. A site was selected just anterior to the ligament of Treitz. This area was incised, entering the lesser sac. The Filippo limb was then passed through the fenestration of the transverse mesocolon and into the lesser sac taking care not to twist on its mesentery. At this point, the omentum and transverse colon were retracted inferiorly. The greater curvature of the stomach was then grasped and the gastrocolic ligament was retracted by my assistant and I then  dissected the ligament directly off of the wall of the stomach using the Harmonic scalpel to widely open the lesser sac. Adhesions between the posterior wall of the stomach and the retroperitoneum were taken down under direct vision to fully free the lesser sac. At this point, an incision was made near the xiphoid. Through this incision, a Stevan retractor was placed through the abdominal wall beneath the left lateral segment of the liver and connected to a bedside retraction system allowing exposure of the esophageal hiatus. While my assistant retracted the stomach and lesser omentum inferiorly, I dissected the angle of His  anterior to posterior down the left dionne of the diaphragm using Harmonic scalpel to assist in eventual division of the gastric pouch and distal stomach remnant. The lesser curvature of the stomach was then examined. A site was selected just proximal to the crow's foot of the vagus nerve in this area. The gastrohepatic ligament was dissected away from the lesser curvature of the stomach directly on the wall of the stomach to enter the lesser sac. This fenestration was then widened using Bovie cautery over a 10 mm articulating esophageal retractor which had been placed through the lesser sac and brought through the fenestration.      Then, a 60 mm [place] stapler was placed transversely across the stomach through this fenestration, clamped and then fired to begin the formation of the gastric pouch. Another stapler was placed near the crotch of the previous staple line and directed superiorly toward the angle of His, and clamped. Prior to firing, a 34-Irish orogastric Maria Isabel tube was brought through the esophagus into the stomach so its tip was against the transverse staple line, its course lying along the lesser curvature of stomach. The stapler was snugged against it and then fired. Then, a 60 mm [place] stapler with Seam Guard reinforcement was placed in the crotch of the previous staple line and clamped and then fired from the crotch of previous staple line directed superiorly toward the angle of His until the gastric pouch was fully divided from the distal stomach remnant. Once fully divided, the staple lines were examined, noted to be hemostatic and viable. The lesser omentum was placed between the staple lines to prevent gastro-gastric fistulization. The tip of the gastric pouch placed in the lesser sac and the distal stomach remnant was retracted anteriorly to allow exposure of the lesser sac. The Padmini limb was examined. The proximal 6 cm were noted to be tethered by its mesentery. This proximal segment was elevated by my assistant and then I excised from the mesentery using a Harmonic scalpel and then one additional staple load was used to divide the devascularized segment from the remainder of the Padmini limb. This segment was brought out of the abdominal cavity via one of the trocar sites, passed off the field and discarded. The padmini limb mesentery was examined to confirm there was no twisting of the mesentery prior to sewing the anastomosis    At this point, the gastrojejunostomy was begun by sewing the right antimesenteric border of the Padmini limb to the distal portion of the gastric pouch using running suture of 3-0 Vicryl.  An anterior gastrotomy and antimesenteric enterotomy were made. From the left apex of these 2 enterotomies, a 3-0 Vicryl suture was placed and run on the posterior surface in a running inverting fashion to the right apex, transitioned on the anterior surface and sewn approximately half way across the opening in an inverting fashion. Then, another 3-0 Vicryl suture was placed at the left apex and sewn to the previous suture in a running inverting fashion on the anterior surface. Prior to tying these sutures, the Maria Isabel tube was brought across the anastomosis, then the sutures were tied, completing the inner layer. Then, from the left apex another 3-0 Vicryl suture was placed and run to the right apex in a running Lembert fashion completing the anterior outer layer os the anastomosis, thereby completing the anastomosis. Once this was complete, the Maria Isabel tube was removed from the patient. Then, working from within the lesser sac, the Padmini limb was sewn to the right anterior surface of the transverse mesocolic defect with  3 interrupted sutures of 2-0 silk. Then, the left side of the padmini limb mesentery was closed to the left side of the tranverse mesocolic defect with a running suture of 2-0 silk while my assistant exposed the defects from within the lesser sac. The omentum and transverse colon were retracted superiorly. The base of the left side of the transverse mesocolic defect was exposed by my assistant and this was sewn to the base of the left side of the Padmini limb mesentery with a pursestring suture of 2-0 silk. The Padmini limb was then retracted to the left.  The right-side of the  transverse mesocolic defect was exposed by my assistant and closed to the right side of the Padmini limb mesentery with another pursestring suture of 2-0 silk, then one additional interrupted suture was placed between the right lateral surface of the Padmini limb and the right lateral surface of the transverse mesocolic defect, completing the closure of the defect around the Filippo limb. Once this was complete, both mesenteric defects were examined and noted to be well closed. Both anastomoses were examined and noted to be hemostatic and viable without evidence of leak. The omentum and transverse colon were retracted inferiorly back to normal position. The gastric remnant was placed over the anastomosis, returning the anastomosis into the lesser sac. The Stevan retractor was removed. Just inferior to the supraumbilical trocar site a stab incision was made and then the umbilical hernia was closed at its fascia using #2 Vicryl placed with a laparoscopic suture passer under direct vision without difficulty or injury. At this point, the abdomen was desufflated via the remaining trocars. All trocars were then removed. The trocar sites were rendered hemostatic with Bovie cautery and then closed by my assistant with interrupted 4-0 Monocryl deep dermal sutures. Dermabond was applied as wound dressings. The patient tolerated the procedure well.

## 2019-11-26 NOTE — PROGRESS NOTES
conducted a pre-surgery visit with Armida Shields, who is a 48 y. o.,male. The  provided the following Interventions:  Initiated a relationship of care and support. Offered prayer and assurance of continued prayers on patient's behalf. Plan:  Chaplains will continue to follow and will provide pastoral care on an as needed/requested basis.  recommends bedside caregivers page  on duty if patient shows signs of acute spiritual or emotional distress.     Daryl Singletary   Spiritual Care   [phone]

## 2019-11-26 NOTE — PERIOP NOTES
Pre-Op Summary    Pt arrived via car with family/friend and is oriented to time, place, person and situation. Patient with steady gait with none assistive devices. Visit Vitals  /78 (BP 1 Location: Left arm, BP Patient Position: At rest)   Pulse 70   Temp 98.6 °F (37 °C)   Resp 16   Ht 5' 9\" (1.753 m)   Wt 122.9 kg (271 lb)   SpO2 100%   BMI 40.02 kg/m²       Peripheral IV located on Left hand . Patients belongings are located with wife. Patient's point of contact is wifeRender Lipoma  and their contact number is: [phone] They will be in the waiting room. They are able to receive medication information. They will be admitted.

## 2019-11-26 NOTE — PERIOP NOTES
1006- Arrived to [facility]. Monitors attached. VSS.      1221- TRANSFER - OUT REPORT:    Verbal report given to Debbie Li RN on Janina Libman  being transferred to 2200(unit) for routine progression of care       Report consisted of patients Situation, Background, Assessment and   Recommendations(SBAR). Information from the following report(s) SBAR, Procedure Summary and Recent Results was reviewed with the receiving nurse. Lines:   Peripheral IV 11/26/19 Left Hand (Active)   Site Assessment Clean, dry, & intact 11/26/2019  6:44 AM   Phlebitis Assessment 0 11/26/2019  6:44 AM   Infiltration Assessment 0 11/26/2019  6:44 AM   Dressing Status Clean, dry, & intact 11/26/2019  6:44 AM   Dressing Type Transparent;Tape 11/26/2019  6:44 AM   Hub Color/Line Status Pink; Infusing 11/26/2019  6:44 AM        Opportunity for questions and clarification was provided.       Patient transported with:   [employer]

## 2019-11-26 NOTE — PROGRESS NOTES
1320 Patient in bed with HOB elevated, bed locked at lowest position, call bell in reach. Family members at bedside. Pt has 6 lap sites to abdomen c/d/i and well approximated. IV site to [facility] is c/d/i, no infiltration, erythema or swelling noted at this time. Patient denies n/v/sob. VSS, will continue to monitor. Bedside and Verbal shift change report given to Bea Ramirez (oncoming nurse) by this nurse Nery Babcock (offgoing nurse). Report included the following information SBAR, Kardex and MAR.

## 2019-11-27 VITALS
DIASTOLIC BLOOD PRESSURE: 88 MMHG | HEIGHT: 69 IN | HEART RATE: 62 BPM | RESPIRATION RATE: 18 BRPM | BODY MASS INDEX: 40.14 KG/M2 | TEMPERATURE: 98.4 F | OXYGEN SATURATION: 98 % | SYSTOLIC BLOOD PRESSURE: 133 MMHG | WEIGHT: 271 LBS

## 2019-11-27 LAB
ANION GAP SERPL CALC-SCNC: 8 MMOL/L (ref 3–18)
BUN SERPL-MCNC: 25 MG/DL (ref 7–18)
BUN/CREAT SERPL: 25 (ref 12–20)
CALCIUM SERPL-MCNC: 8.6 MG/DL (ref 8.5–10.1)
CHLORIDE SERPL-SCNC: 108 MMOL/L (ref 100–111)
CO2 SERPL-SCNC: 23 MMOL/L (ref 21–32)
CREAT SERPL-MCNC: 0.99 MG/DL (ref 0.6–1.3)
ERYTHROCYTE [DISTWIDTH] IN BLOOD BY AUTOMATED COUNT: 12.8 % (ref 11.6–14.5)
GLUCOSE SERPL-MCNC: 80 MG/DL (ref 74–99)
HCT VFR BLD AUTO: 37 % (ref 36–48)
HGB BLD-MCNC: 12.3 G/DL (ref 13–16)
MCH RBC QN AUTO: 29.1 PG (ref 24–34)
MCHC RBC AUTO-ENTMCNC: 33.2 G/DL (ref 31–37)
MCV RBC AUTO: 87.5 FL (ref 74–97)
PLATELET # BLD AUTO: 197 K/UL (ref 135–420)
PMV BLD AUTO: 10.9 FL (ref 9.2–11.8)
POTASSIUM SERPL-SCNC: 4 MMOL/L (ref 3.5–5.5)
RBC # BLD AUTO: 4.23 M/UL (ref 4.7–5.5)
SODIUM SERPL-SCNC: 139 MMOL/L (ref 136–145)
WBC # BLD AUTO: 7.4 K/UL (ref 4.6–13.2)

## 2019-11-27 PROCEDURE — C9113 INJ PANTOPRAZOLE SODIUM, VIA: HCPCS | Performed by: SURGERY

## 2019-11-27 PROCEDURE — 74011000250 HC RX REV CODE- 250: Performed by: SURGERY

## 2019-11-27 PROCEDURE — 85027 COMPLETE CBC AUTOMATED: CPT

## 2019-11-27 PROCEDURE — 36415 COLL VENOUS BLD VENIPUNCTURE: CPT

## 2019-11-27 PROCEDURE — 80048 BASIC METABOLIC PNL TOTAL CA: CPT

## 2019-11-27 PROCEDURE — 74011250637 HC RX REV CODE- 250/637: Performed by: SURGERY

## 2019-11-27 PROCEDURE — 74011250636 HC RX REV CODE- 250/636: Performed by: SURGERY

## 2019-11-27 RX ORDER — ENOXAPARIN SODIUM 100 MG/ML
40 INJECTION SUBCUTANEOUS DAILY
Qty: 7 SYRINGE | Refills: 0 | Status: SHIPPED
Start: 2019-11-27 | End: 2019-12-30

## 2019-11-27 RX ORDER — OXYCODONE HYDROCHLORIDE 5 MG/1
5 TABLET ORAL
Qty: 17 TAB | Refills: 0 | Status: SHIPPED | OUTPATIENT
Start: 2019-11-27 | End: 2019-11-30

## 2019-11-27 RX ORDER — HYOSCYAMINE SULFATE 0.12 MG/1
0.12 TABLET, ORALLY DISINTEGRATING ORAL
Qty: 15 TAB | Refills: 0 | Status: SHIPPED | OUTPATIENT
Start: 2019-11-27 | End: 2020-02-24 | Stop reason: ALTCHOICE

## 2019-11-27 RX ADMIN — ONDANSETRON 4 MG: 2 INJECTION INTRAMUSCULAR; INTRAVENOUS at 09:32

## 2019-11-27 RX ADMIN — ACETAMINOPHEN 650 MG: 325 TABLET, FILM COATED ORAL at 02:45

## 2019-11-27 RX ADMIN — ACETAMINOPHEN 650 MG: 325 TABLET, FILM COATED ORAL at 09:32

## 2019-11-27 RX ADMIN — LISINOPRIL 40 MG: 40 TABLET ORAL at 09:32

## 2019-11-27 RX ADMIN — ENOXAPARIN SODIUM 40 MG: 40 INJECTION, SOLUTION INTRAVENOUS; SUBCUTANEOUS at 09:31

## 2019-11-27 RX ADMIN — KETOROLAC TROMETHAMINE 15 MG: 30 INJECTION, SOLUTION INTRAMUSCULAR at 04:46

## 2019-11-27 RX ADMIN — SODIUM CHLORIDE, SODIUM LACTATE, POTASSIUM CHLORIDE, AND CALCIUM CHLORIDE 150 ML/HR: 600; 310; 30; 20 INJECTION, SOLUTION INTRAVENOUS at 04:30

## 2019-11-27 RX ADMIN — HYOSCYAMINE SULFATE 0.12 MG: 0.12 TABLET, ORALLY DISINTEGRATING ORAL at 10:18

## 2019-11-27 RX ADMIN — SODIUM CHLORIDE 40 MG: 9 INJECTION, SOLUTION INTRAMUSCULAR; INTRAVENOUS; SUBCUTANEOUS at 09:32

## 2019-11-27 RX ADMIN — GABAPENTIN 300 MG: 300 CAPSULE ORAL at 09:31

## 2019-11-27 NOTE — ROUTINE PROCESS
Bedside and Verbal shift change report given to WILLIAM Baugh (oncoming nurse) by Samira Carlson RN, BSN (offgoing nurse). Report included the following information SBAR, Kardex, OR Summary, Procedure Summary, Intake/Output, MAR and Recent Results.   
 
Samira Carlson RN, BSN

## 2019-11-27 NOTE — PROGRESS NOTES
Problem: Falls - Risk of  Goal: *Absence of Falls  Description  Document Gustavo Preston Fall Risk and appropriate interventions in the flowsheet.   Outcome: Progressing Towards Goal  Note: Fall Risk Interventions:  Mobility Interventions: Patient to call before getting OOB    Mentation Interventions: Door open when patient unattended, Update white board    Medication Interventions: Patient to call before getting OOB, Teach patient to arise slowly    Elimination Interventions: Call light in reach, Patient to call for help with toileting needs    History of Falls Interventions: Door open when patient unattended         Problem: Patient Education: Go to Patient Education Activity  Goal: Patient/Family Education  Outcome: Progressing Towards Goal     Problem: Pain  Goal: *Control of Pain  Outcome: Progressing Towards Goal     Problem: Laparoscopic Gastric Bypass:Day of Surgery  Goal: Off Pathway (Use only if patient is Off Pathway)  Outcome: Progressing Towards Goal  Goal: Activity/Safety  Outcome: Progressing Towards Goal  Goal: Consults, if ordered  Outcome: Progressing Towards Goal  Goal: Diagnostic Test/Procedures  Outcome: Progressing Towards Goal  Goal: Nutrition/Diet  Outcome: Progressing Towards Goal  Goal: Medications  Outcome: Progressing Towards Goal  Goal: Respiratory  Outcome: Progressing Towards Goal  Goal: Treatments/Interventions/Procedures  Outcome: Progressing Towards Goal  Goal: Psychosocial  Outcome: Progressing Towards Goal  Goal: *No signs and symptoms of infection or wound complications  Outcome: Progressing Towards Goal  Goal: *Optimal pain control at patient's stated goal  Outcome: Progressing Towards Goal  Goal: *Adequate urinary output (equal to or greater than 30 milliliters/hour)  Outcome: Progressing Towards Goal  Goal: *Hemodynamically stable  Outcome: Progressing Towards Goal  Goal: *Tolerating diet  Outcome: Progressing Towards Goal  Goal: *Demonstrates progressive activity  Outcome: Progressing Towards Goal  Goal: *Absence of signs and symptoms of DVT  Outcome: Progressing Towards Goal  Goal: *Labs within defined limits  Outcome: Progressing Towards Goal  Goal: *Oxygen saturation within defined limits  Outcome: Progressing Towards Goal     Problem: Laparoscopic Gastric Bypass:Post-Op Day 1  Goal: Off Pathway (Use only if patient is Off Pathway)  Outcome: Progressing Towards Goal  Goal: Activity/Safety  Outcome: Progressing Towards Goal  Goal: Diagnostic Test/Procedures  Outcome: Progressing Towards Goal  Goal: Nutrition/Diet  Outcome: Progressing Towards Goal  Goal: Discharge Planning  Outcome: Progressing Towards Goal  Goal: Medications  Outcome: Progressing Towards Goal  Goal: Respiratory  Outcome: Progressing Towards Goal  Goal: Treatments/Interventions/Procedures  Outcome: Progressing Towards Goal  Goal: Psychosocial  Outcome: Progressing Towards Goal  Goal: *No signs and symptoms of infection or wound complications  Outcome: Progressing Towards Goal  Goal: *Optimal pain control at patient's stated goal  Outcome: Progressing Towards Goal  Goal: *Adequate urinary output (equal to or greater than 30 milliliters/hour)  Outcome: Progressing Towards Goal  Goal: *Hemodynamically stable  Outcome: Progressing Towards Goal  Goal: *Tolerating diet  Outcome: Progressing Towards Goal  Goal: *Demonstrates progressive activity  Outcome: Progressing Towards Goal  Goal: *Absence of signs and symptoms of DVT  Outcome: Progressing Towards Goal  Goal: *Labs within defined limits  Outcome: Progressing Towards Goal  Goal: *Oxygen saturation within defined limits  Outcome: Progressing Towards Goal  Goal: *Upper GI series/No leak identified  Outcome: Progressing Towards Goal     Problem: Laparoscopic Gastric Bypass:Post-Op Day 2  Goal: Off Pathway (Use only if patient is Off Pathway)  Outcome: Progressing Towards Goal  Goal: Activity/Safety  Outcome: Progressing Towards Goal  Goal: Diagnostic Test/Procedures  Outcome: Progressing Towards Goal  Goal: Nutrition/Diet  Outcome: Progressing Towards Goal  Goal: Discharge Planning  Outcome: Progressing Towards Goal  Goal: Medications  Outcome: Progressing Towards Goal  Goal: Respiratory  Outcome: Progressing Towards Goal  Goal: Treatments/Interventions/Procedures  Outcome: Progressing Towards Goal  Goal: Psychosocial  Outcome: Progressing Towards Goal     Problem: Laparoscopic Gastric Bypass:Discharge Outcomes  Goal: *Active bowel sounds  Outcome: Progressing Towards Goal  Goal: *Absence of signs and symptoms of DVT  Outcome: Progressing Towards Goal  Goal: *Hemodynamically stable  Outcome: Progressing Towards Goal  Goal: *Lungs clear or at baseline  Outcome: Progressing Towards Goal  Goal: *Demonstrates independent activity or return to baseline  Outcome: Progressing Towards Goal  Goal: *Optimal pain control at patient's stated goal  Outcome: Progressing Towards Goal  Goal: *Verbalizes understanding and describes prescribed diet  Outcome: Progressing Towards Goal  Goal: *Tolerating diet  Outcome: Progressing Towards Goal  Goal: *Verbalizes name, dosage, time, side effects, and number of days to continue medications  Outcome: Progressing Towards Goal  Goal: *No signs and symptoms of infection or wound complications  Outcome: Progressing Towards Goal  Goal: *Anxiety reduced or absent  Outcome: Progressing Towards Goal  Goal: *Understands and describes signs and symptoms to report to providers (eg: s/s of leak, DVT; inability to tolerate diet)  Outcome: Progressing Towards Goal  Goal: *Describes follow-up/return visits to physicians  Outcome: Progressing Towards Goal  Goal: *Describes available resources and support systems  Outcome: Progressing Towards Goal

## 2019-11-27 NOTE — PROGRESS NOTES
Reason for Admission:  Morbid obesity                   RRAT Score:     15             Do you (patient/family) have any concerns for transition/discharge? Not at this time              Plan for utilizing home health:   no    Current Advanced Directive/Advance Care Plan:  Not on file            Transition of Care Plan:       [facility] Mcintosh pt with spouse at bedside with pt permission and verified all face sheet info. Pt states he lives with wife in a tri-level home. Reports that at baseline he is independent with ADL's and uses no DME. The pt does drive and is able to obtain needed meds from local pharmacy. Pt with discharge orders. Denies concerns with plan to discharge home today. Patient has designated _____wife___________________ to participate in his/her discharge plan and to receive any needed information. Name: Salina Lynne  Address:  Phone number: [phone]    Care Management Interventions  PCP Verified by CM:  Yes  Last Visit to PCP: 10/27/19  Mode of Transport at Discharge: Self(Wife will drive)  Transition of Care Consult (CM Consult): Discharge Planning  Current Support Network: Lives with Spouse  Confirm Follow Up Transport: Self  Plan discussed with Pt/Family/Caregiver: Yes  Discharge Location  Discharge Placement: Home

## 2019-11-27 NOTE — DISCHARGE SUMMARY
Bariatric Surgery Discharge Progress Note    Admission Date: 11/26/2019    Discharge Date: 11/27/2019      Admission Diagnosis:    Clinically severe Obesity    Comorbidities:  adult onset diabetes mellitus, dyslipidemia, hyperlipidemia, GERD, obstructive sleep apnea - clinical and weight related arthopathies    Discharge Diagnosis:     Clinically Severe Obesity, s/p laparoscopic gastric bypass surgery with comorbidities as listed above    Procedures:   laparoscopic gastric bypass surgery    Postop Complications: none    Hospital Course:  Patient was admitted on 11/26/2019 for scheduled bariatric surgery. Operation was without significant complication. Patient admitted to the floor postoperatively, monitored as per protocol. Diet sequentially advanced beginning POD 1, pain medications transitioned to oral during the hospital course. At the time of discharge, the patient is afebrile, vital signs stable, tolerating a clear liquid diet with protein supplementation, voiding spontaneously, ambulatory with adequate pain control with oral medications and clear surgical sites without evidence of infection.     Discharge Diet:  Clear Liquid Bariatric Diet for 7 days, then soft moist protein diet for 5 weeks    Discharge Medications:   *All medications as per Medical Reconciliation Form\"    Flintstones Complete Chewable vitamins, 2 orally daily for life  Calcium Citrate 2000mg orally daily for life  Vitamin B12 1000micrograms sublingual daily for life  Oxycodone 5mg tab 1-2 by mouth every 4-6 hours as needed for pain uncontrolled with Tylenol  Enoxaparin (Lovenox) 40mg sub-Q daily for 7 days    Discharge disposition: home    Local wound care with daily showers, keep wounds clean and dry    Activity: as desired, no lifting greater than 15lbs or situps for 30 days    Special Instructions:   No driving until activity is not influenced by incisional pain and off narcotics   No bath or hot tub until wounds are healed   Pulse and temperature twice daily for 10 days   Notify [facility] for a Temp >100.5 or Pulse>115    Followup with surgeon in 2 weeks      FREDY Coleman-[facility]   Office: [phone]

## 2019-11-27 NOTE — PROGRESS NOTES
Surgery Progress Note    11/27/2019    Admit Date: 11/26/2019    Subjective:     Patient has complaints of pain and nausea Pain is controlled with current regimen. Patient has been ambulating in halls. He reports nausea, no vomiting and is tolerating ice chips well. Objective:     Blood pressure (!) 140/93, pulse 75, temperature 98 °F (36.7 °C), resp. rate 18, height 5' 9\" (1.753 m), weight 122.9 kg (271 lb), SpO2 98 %. No intake/output data recorded. 11/25 1901 - 11/27 0700  In: 1700 [I.V.:1700]  Out: 0 [Urine:565]    EXAM: GENERAL: alert, pleasant, no distress   HEART: regular rate and rhythm   LUNGS: clear to auscultation   ABDOMEN:  Soft, obese, appropriately tender, nondistended, incisions clean, dry, no erythema or drainage   EXTREMITIES: warm, well perfused    Data Review    Recent Results (from the past 24 hour(s))   GLUCOSE, POC    Collection Time: 11/26/19 10:44 AM   Result Value Ref Range    Glucose (POC) 167 (H) 70 - 110 mg/dL   CBC W/O DIFF    Collection Time: 11/27/19  4:55 AM   Result Value Ref Range    WBC 7.4 4.6 - 13.2 K/uL    RBC 4.23 (L) 4.70 - 5.50 M/uL    HGB 12.3 (L) 13.0 - 16.0 g/dL    HCT 37.0 36.0 - 48.0 %    MCV 87.5 74.0 - 97.0 FL    MCH 29.1 24.0 - 34.0 PG    MCHC 33.2 31.0 - 37.0 g/dL    RDW 12.8 11.6 - 14.5 %    PLATELET 690 142 - 793 K/uL    MPV 10.9 9.2 - 27.3 FL   METABOLIC PANEL, BASIC    Collection Time: 11/27/19  4:55 AM   Result Value Ref Range    Sodium 139 136 - 145 mmol/L    Potassium 4.0 3.5 - 5.5 mmol/L    Chloride 108 100 - 111 mmol/L    CO2 23 21 - 32 mmol/L    Anion gap 8 3.0 - 18 mmol/L    Glucose 80 74 - 99 mg/dL    BUN 25 (H) 7.0 - 18 MG/DL    Creatinine 0.99 0.6 - 1.3 MG/DL    BUN/Creatinine ratio 25 (H) 12 - 20      GFR est AA >60 >60 ml/min/1.73m2    GFR est non-AA >60 >60 ml/min/1.73m2    Calcium 8.6 8.5 - 10.1 MG/DL       Assessment:   Janna Pandya is a 48 y.o. male, postop day 1 status post laparoscopic gastric bypass surgery.   Condition: good    Plan:   -Ambulate every four hours  -Oxycodone 5mg 1-2 tabs po every 4-6 hour prn pain uncontrolled by tylenol  -Advance to Clear liquid Gastric Bypass Diet, if able to tolerate clear liquid diet 4oz per hour one of which being a protein supplement, will discharge home later today          ELLIE Bender-BC  [facility]   Office: [phone]

## 2019-11-27 NOTE — DISCHARGE INSTRUCTIONS
Hydration  Hydration is your NUMBER ONE priority. Dehydration is the most common reason for readmission to the hospital. Dehydration occurs when  your body does not get enough fluid to keep it functioning at its best. Your body also requires fluid  to burn its stored fat calories for energy. Carry a bottle of water with you all day, even when you are away from home; remind yourself to  drink even if you dont feel thirsty. Drinking 64 ounces of fluid is your daily goal. You can tell if  youre getting enough fluid if youre making clear, light-colored urine five to 10 times per day. Signs of dehydration can be thirst, headache, hard stools or dizziness upon sitting or standing up. You should contact your surgeons office if you are unable to drink enough fluid to stay hydrated. --   [address] after Surgery   No lifting over 15 pounds for four weeks.  No driving while taking the pain medication (about seven to 10 days).  No tub baths, swimming or hot tubs until incisions are healed (about two weeks).  You may shower. Clean incisions daily /gently with soap and check incisions for signs of infection:  -- Redness around incision. -- Swelling at site. -- Drainage with an foul odor (pus). -- Increase tenderness around incision.  Take your temperature and resting pulse in the morning and evening. Record on tracking form  given to you. Call if your temperature is greater than 101 or your pulse rate is greater than 115.  Please contact your surgeon if you are having excessive abdominal pain (that lasts longer than  four hours and does not improve with prescribed pain medication), vomiting or shortness of breath.  Get up and move -- do not sit in one place for more than an hour.  You need to WALK (EXERCISE) for 30 minutes per day. -- Walking around your house does not count. -- Bike, treadmill and elliptical are OK. -- NO weight lifting or sit ups.    If constipated take an adult dose of Miralax (available over the counter). Contact the doctors  office if Miralax doesnt help.  You may swallow pills starting the day after surgery as long as they fit inside this [place]:   Continue to use your incentive spirometer (breathing machine) for the next couple of weeks to  help prevent pneumonia. [address]  Temperature/Heart Rate Log  Take your temperature and heart rate (pulse) twice a day for 14 days. Take both in the morning and  evening at about the same time each day (when you wake up and before you go to bed when you  are relaxed). Please contact your doctors office if your:   Temperature is higher than 101 degrees.  Heart rate (pulse) is higher than 115 beats per minute  (normal heart rate is 60 to 100 beats per minute). How to Take Your Heart Rate (Pulse)   Turn your left hand so that your palm is face-up.  With the index and middle fingers of your right  hand, draw a line from the base of your thumb to  just below the crease in your wrist. Your fingers  should nestle just to the left of the large tendon that  pops up when you bend your wrist toward you.  Dont press too hard, that will make the pulse go  away. Use gentle pressure.  Wait. It can take several seconds -- and several micro-adjustments in the placement of your two  fingers on your wrist -- to find your pulse. Just keep moving your fingers down or up your wrist  in small increments (and pausing for a few seconds) until you find it. How to Take Your Pulse Rate   Find a watch with a second hand and place it on your right wrist or on the table next  to your left hand.  After finding your pulse, count the number of beats for 20 seconds.  Multiply by three to get your heart rate, or beats per minute  (or just count for 60 seconds for a math-free option).  Normal, resting heart rate is about 60 to 100 beats per minute.   -- 46 --  PATIENT GUIDE TO BARIATRIC [address] [place] [address] Rd  Lovenox Self Injection Guide  Prepare  Step 1: Wash and dry your hands thoroughly. Step 2: Sit or lie in a comfortable position and choose an area  on the right or left side of the abdomen at least two inches away  from the belly button. Step 3: Clean the injection site with an alcohol swab and let dry. Inject  Step 4: Remove the needle cap by pulling it straight off the syringe and  discard it in a sharps . Step 5: With your other hand, pinch an inch of the cleansed area to  make a fold in the skin. Insert the full length of the needle straight  down -- at a 90? angle -- into the fold of skin. -- 50 --  PATIENT GUIDE TO BARIATRIC [address] [place] [address] Rd  Step 6: Press the plunger with your thumb until the syringe is empty. Then pull the needle straight out and release the skin fold. Dispose  Step 7: Point the needle down and away from yourself and others,  and then push down on the plunger to activate the safety shield. Step 8: Place the used syringe in the sharps . Do NOT expel the air bubble from the syringe before the injection. Administration should be alternated between the left and right abdominal wall. The whole length  of the needle should be introduced into a skin fold held between the thumb and forefinger; the  skin fold should be held throughout the injection. To minimize bruising, do not rub the injection  site after completion of the injection. Questions about LOVENOX? Call [phone]  -- 49 --  PATIENT GUIDE TO [address] [place] [address]  9. DIET AND LIFESTYLE  Key Diet Principles Following Bariatric Surgery   Begin each meal with soft moist high protein foods (i.e. chicken, turkey, yogurt, tuna, eggs,  cottage cheese, other fish and seafood).  Consume a minimum of 64 ounces of fluid each day to prevent dehydration. No straws.  No food and fluid together.  Stop drinking 30 minutes before a meal. You may begin fluids again  30 minutes after you finish a meal.   Eat very slowly and chew all foods completely.  Keep portions small.  No simple sugars or high fat foods. No carbonated beverages. No caffeine.  Eat three meals per day. No skipping. Avoid snacking between meals.  No alcohol. No smoking.  Two Flintstones Complete Chewable vitamins each day. Take one in the morning and one at night.  1,500 milligrams Calcium Citrate per day in separate dosages.  Vitamin D 3: 5,000 IU taken per day.  Vitamin B-12: Take 1,000 micrograms sublingually daily.  Iron: 60 milligrams per day from [facility].  Protein supplements of your choice. Must be low sugar (0 to 3 grams), low fat (0 to 3 grams) and  provide at least 35 to 40 grams of protein each day. You need 60 to 70 grams of protein (food  and supplements) each day.  Minimum of 30 minutes of physical activity daily. Do not april NSAIDS . Do not take Steroids without your surgeons permission. -- 48 --  [address] [facility]  Your Priorities After Surgery  ? Fluid: 64 ounces of fluid per day. ? Protein: 60 to 70 grams of protein per day. ? Walk every day. Clear Liquid Diet  One week of clear liquids: minimum of 64 ounces of fluid per day. Fluid:   Zero calorie liquids.  No caffeine.  No carbonation.  No sugary drinks.  No alcohol.  No straws. Food   Protein drinks.  Less than 3 grams of sugar and  3 grams of fat per serving.  Protein drink should provide you with  60 to 70 grams of protein. Soft Protein Diet  Five weeks of soft protein (1 ounce for soft protein, 3 ounces of yogurt/cottage cheese). Three meals per day and 1 protein shake. Protein shakes should provide you with 30 grams of  protein on the soft protein diet.   Slow transition:   First week on soft protein diet -- focus on yogurt, cottage cheese, eggs, vegetarian refried beans,  black beans, kidney beans and white beans. (NO BAKED BEANS.)   Second through fourth week on soft protein diet -- focus on yogurt, cottage cheese, eggs,  canned tuna, canned chicken, tilapia and fish (needs to be soft enough to be cut up with a fork).  Fifth week on soft protein diet -- focus on yogurt, cottage cheese, eggs, canned tuna, canned  chicken, tilapia, fish, salmon, chicken breast or turkey. Fluid is your #1 Priority! Continue clear liquids between meals. You will need 64 ounces of fluid per day. Fluids that you can have include:   Water.  Zero calorie liquids. You will need to sip throughout the day and should therefore have a water bottle with you at all  times! No liquids with your meals. Stop 30 minutes before a meal and wait 30 minutes after a meal.  No straws. Zero calorie liquids. No caffeine. No carbonation. No sugary drinks. No alcohol. -- 46 --  [address] [facility]  Protein  You will need 60 to 70 grams of protein per day.  60 to 70 grams of protein shakes when on the clear liquid diet (two to three shakes per day).  30 to 50 grams of protein shakes when on the soft protein diet (one shake per day). Eat Three Times Per Day  You will need to eat three times per day. My planned times are:  _________________________________________________________  _________________________________________________________  _________________________________________________________  Nausea, Vomiting, Stomach Pain  If you have problems with nausea, vomiting or stomach pain, try:   Eating slowly: 20 to 30 minutes per meal.   Chewing food thoroughly: 20 to 30 chews before food is swallowed.  Small portions: measure portions in medicine cup.  Stopping before feeling full.  AVOIDING SUGAR and FRIED FOOD: sugar will cause dumping syndrome and lead to weight gain.   Exercise  I will need to get a minimum of 30 minutes of exercise per day or 150 minutes of exercise per week.  Walking, swimming, biking or elliptical.   Find something you enjoy! Vitamins  After surgery, you will need to take the following vitamins for the rest of your life -- FOREVER.  Vitamin D 3: 5,000 IU per day.  Calcium Citrate: 1,500 milligrams, taken separately.  Flintstones Complete: two per day, taken separately.  Sublingual Vitamin B-12: 1,000 micrograms daily.  Iron for menstruating women or patients with a history of low iron: 65 milligrams daily. We recommend going to [employer]. com and purchasing iron from there. The lemon-lime has 60 milligrams. This iron is better absorbed than over-the-counter iron. -- 46 --  PATIENT GUIDE TO [address]  Clear Liquid Log  Getting your fluid in is top priority during this week. Fluids (MINIUM of 64 ounces per day):  ? 8 oz. ? 8 oz. ? 8 oz. ? 8 oz. ? 8 oz. ? 8 oz. ? 8 oz. ? 8 oz. ? 8 oz. ? 8 oz. ? 8 oz. ? 8 oz. Flintstones Complete Chewable: ? a.m. ? p.m. Calcium Citrate (1,500 milligrams/day):  Pill form  ? Two crushed pills (morning) ? Two crushed pills (afternoon) ? Two crushed pills (evening)  OR Upcal D (powder)  ? One pack/scoop ? One pack/scoop ? One pack/scoop  OR Celebrate Chewable Vitamins (500 mg each) or [facility] Advantage Chewables (500 mg)  ? One chewable (morning) ? One chewable (afternoon) ? One chewable (evening)  OR Liquid Calcium Citrate  ? 1 tbsp. Calcium Citrate ? 1 tbsp. Calcium Citrate ? 1 tbsp. Calcium Citrate  Vitamin D3: ? 5,000 IU daily. Vitamin B-12: ? 1,000 micrograms per day. Iron (menstruating women or patients with a history of low iron):  ? 60 milligrams per day from [facility] [employer]. Protein drinks (protein drinks should be under 3 grams of sugar and 3 grams of fat). Protein shake (60 grams per day): ? a.m. ? p.m. Exercise: ? 30 to 40 minutes per day.   -- 48 --  PATIENT GUIDE TO  [facility]  Bariatric Soft and Moist Diet Shopping List   alcium Citrate (1,500 milligrams/day):  Pill form  ? Two crushed pills (morning) ? Two crushed pills (afternoon) ? Two crushed pills (evening)  OR Upcal D (powder)  ? One pack/scoop ? One pack/scoop ? One pack/scoop  OR Celebrate Chewable Vitamins (500 mg each) or [facility] [employer] Chewables (500 mg)  ? One chewable (morning) ? One chewable (afternoon) ? One chewable (evening)  OR Liquid Calcium Citrate  ? 1 tbsp. Calcium Citrate ? 1 tbsp. Calcium Citrate ? 1 tbsp. Calcium Citrate  Vitamin D3: ? 5,000 IU daily  Vitamin B-12: ? 1,000 micrograms per day  Iron (menstruating women or  patients with a history of low iron):  ? 60 milligrams per day  from [facility] [employer]  Protein drinks (protein drinks should be under  3 grams of sugar and 3 grams of fat). Protein shake (30 to 40 grams per day):  ? a.m. ? p.m. Exercise: ? 30 to 40 minutes per  Educated on Diet Progression    [facility] Gastric Bypass and Sleeve Dietary Progression    Patient Name:   Date of Surgery: Ice Chips start once admitted on floor. Begin Bariatric Clear Liquid Diet on:     Clear Liquid Diet: 64 oz. of fluid per day  o Low calorie, low sugar, non-carbonated beverages  - Water, Crystal Light, Propel Water, Sugar Free Jell-O, Sugar Free Popsicles, Bouillon  - Start protein supplement during this stage. (60-70 grams per day)  - Getting your fluid in and staying hydrated is your #1 priority! - The clear liquid diet will last for 7 days. Begin Bariatric Soft and Moist on:   - This stage of the diet will last for 5 weeks, unless otherwise instructed by your surgeon. - Begin:  1 week post-op   - End:  6 weeks post-op (or when you follow up with the Registered Dietitian)    - Soft, moist, high protein foods: 3 meals per day plus protein supplements. o   Portions should emphasize on soft protein.   o Portions will be a MAXIMUM of:  o  1 ounce of solid food  o  2-3 ounces of cottage cheese and yogurt. o Protein supplements should be between meals and provide 30-40 grams per day during soft protein diet. o Continue to get 64 ounces of fluid in per day. - Protein foods that are ok on the Soft and Moist Diet include:  o Slow transition:  o 1st week on soft protein should focus on: Yogurt, cottage cheese, eggs  o 2nd -4th  week on soft protein diet should focus on: yogurt, cottage cheese, eggs, canned tuna, canned chicken, tilapia, fish (needs to be soft enough to be cut up with a fork)  o 5th week on soft protein diet should focus on: Yogurt, cottage cheese, eggs, canned tuna, canned chicken, tilapia, fish, salmon, chicken breast, or turkey. Remember to continue to get 64 ounces of fluid daily on ALL Stages. To be advanced to Bariatric Maintenance Stage of the bariatric diet, follow up with the dietitian 6 weeks post-op, around: For any additional questions, please refer to your blue binder that was provided to you at the start of the bariatric program.   DISCHARGE SUMMARY from Nurse    PATIENT INSTRUCTIONS:    After general anesthesia or intravenous sedation, for 24 hours or while taking prescription Narcotics:  · Limit your activities  · Do not drive and operate hazardous machinery  · Do not make important personal or business decisions  · Do  not drink alcoholic beverages  · If you have not urinated within 8 hours after discharge, please contact your surgeon on call. Report the following to your surgeon:  · Excessive pain, swelling, redness or odor of or around the surgical area  · Temperature over 100.5  · Nausea and vomiting lasting longer than 4 hours or if unable to take medications  · Any signs of decreased circulation or nerve impairment to extremity: change in color, persistent  numbness, tingling, coldness or increase pain  · Any questions    *  Please give a list of your current medications to your Primary Care Provider.     *  Please update this list whenever your medications are discontinued, doses are      changed, or new medications (including over-the-counter products) are added. *  Please carry medication information at all times in case of emergency situations. These are general instructions for a healthy lifestyle:    No smoking/ No tobacco products/ Avoid exposure to second hand smoke  Surgeon General's Warning:  Quitting smoking now greatly reduces serious risk to your health. Obesity, smoking, and sedentary lifestyle greatly increases your risk for illness    A healthy diet, regular physical exercise & weight monitoring are important for maintaining a healthy lifestyle    You may be retaining fluid if you have a history of heart failure or if you experience any of the following symptoms:  Weight gain of 3 pounds or more overnight or 5 pounds in a week, increased swelling in our hands or feet or shortness of breath while lying flat in bed. Please call your doctor as soon as you notice any of these symptoms; do not wait until your next office visit. The discharge information has been reviewed with the patient. The patient verbalized understanding. Discharge medications reviewed with the patient and appropriate educational materials and side effects teaching were provided.   ___________________________________________________________________________________________________________________________________

## 2019-11-27 NOTE — PROGRESS NOTES
Problem: Discharge Planning  Goal: *Discharge to safe environment  Outcome: Resolved/Met   Plan: home

## 2019-12-04 ENCOUNTER — TELEPHONE (OUTPATIENT)
Dept: MEDSURG UNIT | Age: 54
End: 2019-12-04

## 2019-12-04 NOTE — TELEPHONE ENCOUNTER
Post Op Follow Up Call- Bariatric Surgery  Date of Surgery          Type of Surgery    Pain 4/10  V. S WNL  Lovenox yes  Water 64 ounces  Protein 60 grams  Other  Walking 25 min  BM yes  Nausea yes  Vomiting no  Vitamins yes  Recommendations reeducated on reasons for nausea.   Instructed to call office if not getting enough fluids  Instructed to call office for ant problems before visiting E.R    Follow up appointment  Surgeon yes  Dietitian yes

## 2019-12-05 ENCOUNTER — OFFICE VISIT (OUTPATIENT)
Dept: FAMILY MEDICINE CLINIC | Age: 54
End: 2019-12-05

## 2019-12-05 VITALS
OXYGEN SATURATION: 94 % | HEART RATE: 71 BPM | BODY MASS INDEX: 38.51 KG/M2 | WEIGHT: 260 LBS | SYSTOLIC BLOOD PRESSURE: 113 MMHG | HEIGHT: 69 IN | RESPIRATION RATE: 16 BRPM | DIASTOLIC BLOOD PRESSURE: 75 MMHG | TEMPERATURE: 98.4 F

## 2019-12-05 DIAGNOSIS — G89.29 CHRONIC BILATERAL LOW BACK PAIN WITH BILATERAL SCIATICA: Primary | ICD-10-CM

## 2019-12-05 DIAGNOSIS — M54.42 CHRONIC BILATERAL LOW BACK PAIN WITH BILATERAL SCIATICA: Primary | ICD-10-CM

## 2019-12-05 DIAGNOSIS — R79.89 LOW VITAMIN D LEVEL: ICD-10-CM

## 2019-12-05 DIAGNOSIS — M54.41 CHRONIC BILATERAL LOW BACK PAIN WITH BILATERAL SCIATICA: Primary | ICD-10-CM

## 2019-12-05 DIAGNOSIS — E11.9 CONTROLLED TYPE 2 DIABETES MELLITUS WITHOUT COMPLICATION, WITHOUT LONG-TERM CURRENT USE OF INSULIN (HCC): ICD-10-CM

## 2019-12-05 DIAGNOSIS — E66.01 MORBID OBESITY (HCC): ICD-10-CM

## 2019-12-05 DIAGNOSIS — I10 ESSENTIAL HYPERTENSION WITH GOAL BLOOD PRESSURE LESS THAN 130/80: ICD-10-CM

## 2019-12-05 RX ORDER — DULOXETIN HYDROCHLORIDE 60 MG/1
60 CAPSULE, DELAYED RELEASE ORAL DAILY
Qty: 30 CAP | Refills: 5 | Status: SHIPPED | OUTPATIENT
Start: 2019-12-05 | End: 2019-12-09 | Stop reason: SDUPTHER

## 2019-12-05 RX ORDER — LISINOPRIL 20 MG/1
20 TABLET ORAL DAILY
Qty: 30 TAB | Refills: 5 | Status: SHIPPED | OUTPATIENT
Start: 2019-12-05 | End: 2019-12-09 | Stop reason: SDUPTHER

## 2019-12-05 NOTE — PROGRESS NOTES
[facility]    CC: F/U for Chronic Disease Management    HPI:     MO:  -Had laparoscopic padmini-en-y gastric bypass on 11/26/2019  -Denies any complication during or after the surgery  -Currently following recommended post op diet  -Reports having already lost a notable amount of weight after the surgery      HTN:  -Taking BP medication as prescribed  -Denies any side effects or issues with the medication  -Does not check BP at home  -On post op diet      Low Vitamin D:  -Taking 1000 units of vitamin D as recommended by bariatric surgery  -Denies any side effects or issue with the supplement      Chronic Lower Back Pain:  -Associated with sciatica  -Prescribed 600 mg TID of gabapentin for issue by orthopedist  -Last saw on 6/13/2019 and has no F/U appointment scheduled  -Reports pain is inadequately controlled currently      ROS: Positive items marked in RED  CON: fever, chills  Cardiovascular: palpitations, CP  Resp: SOB, cough  GI: nausea, vomiting, diarrhea  : dysuria, hematuria    Past Medical History:   Diagnosis Date    Adverse effect of anesthesia     brother- BP drops after surgery-difficult to wake    Back pain     Diabetes mellitus, type II ([address]     Diverticulitis     Erectile dysfunction     GERD (gastroesophageal reflux disease)     Hypertension     Kidney stones     Nephrolithiasis     Osteoarthritis     Pneumonia     Psoriatic arthritis ([address]     Psoriatic arthritis, 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 Avitia Headings, rheum    Renal calculus, right     Snores     Swallowing difficulty     Ureteral calculi        Past Surgical History:   Procedure Laterality Date    HX CHOLECYSTECTOMY      2002    HX COLECTOMY  2017    HX COLONOSCOPY  02.22.2016    HX UROLOGICAL  06/06/2016    [facility]-Cystoscopy, CYSTOURETHROSCOPY W/ INDWELLING URETERAL STENT INSERTION, Dr Aquiles Morris     UROLOGICAL  05/29/2016    [facility]- Cystoscopy, Right retrograde pyelography, Right 6 x 26 cm double-J ureteral stent placement,Intraoperative fluoro less than 1 hour,Interpretation of Urography, Dr Az Beal Left        Family History   Problem Relation Age of Onset    Cancer Mother         Lung    Hypertension Mother     Stroke Mother     Cancer Father         Brain    Heart Attack Father        Social History     Socioeconomic History    Marital status:      Spouse name: Not on file    Number of children: Not on file    Years of education: Not on file    Highest education level: Not on file   Tobacco Use    Smoking status: Never Smoker    Smokeless tobacco: Never Used   Substance and Sexual Activity    Alcohol use: Yes     Alcohol/week: 0.0 standard drinks     Comment: occ    Drug use: No    Sexual activity: Yes     Partners: Female       Allergies   Allergen Reactions    Medrol [Methylprednisolone] Other (comments)     tachycardia  Patient states he had a severe allergic reaction about 15 years ago.  Lipitor [Atorvastatin] Other (comments)     Muscle cramping    Prednisone Other (comments)     tachycardia    Hydrocodone-Acetaminophen Itching         Current Outpatient Medications:     hyoscyamine sulfate (CYSTOSPAZ) 0.125 mg tablet, 1 Tab by SubLINGual route every six (6) hours as needed for Other (Spasms). , Disp: 15 Tab, Rfl: 0    cholecalciferol (VITAMIN D3) 25 mcg (1,000 unit) cap, Take  by mouth daily. , Disp: , Rfl:     cyanocobalamin (VITAMIN B-12) 1,000 mcg tablet, Take 1,000 mcg by mouth daily. , Disp: , Rfl:     pediatric multivitamins chewable tablet, Take 1 Tab by mouth daily. , Disp: , Rfl:     traZODone (DESYREL) 150 mg tablet, Take 1 Tab by mouth nightly., Disp: 30 Tab, Rfl: 3    fexofenadine (ALLEGRA) 180 mg tablet, Take 1 Tab by mouth daily. , Disp: 90 Tab, Rfl: 3    adalimumab (HUMIRA) 40 mg/0.8 mL injection, by SubCUTAneous route once., Disp: , Rfl:     lisinopril (PRINIVIL, ZESTRIL) 40 mg tablet, Take 1 Tab by mouth daily. , Disp: 90 Tab, Rfl: 1    pravastatin (PRAVACHOL) 40 mg tablet, TAKE 1 TABLET BY MOUTH ONCE DAILY AT BEDTIME FOR CHOLESTEROL, Disp: 90 Tab, Rfl: 0    gabapentin (NEURONTIN) 600 mg tablet, Take 1 Tab by mouth three (3) times daily. , Disp: 90 Tab, Rfl: 2    hydrOXYzine HCl (ATARAX) 50 mg tablet, Take 1 Tab by mouth every six (6) hours as needed for Anxiety. , Disp: 180 Tab, Rfl: 1    omeprazole (PRILOSEC OTC) 20 mg tablet, Take 20 mg by mouth daily. , Disp: , Rfl:     enoxaparin (LOVENOX) 40 mg/0.4 mL, 0.4 mL by SubCUTAneous route daily. , Disp: 7 Syringe, Rfl: 0    sildenafil citrate (VIAGRA) 100 mg tablet, take 1 tablet 1 hour prior to intercourse, Disp: 10 Tab, Rfl: 3    cyclobenzaprine (FLEXERIL) 10 mg tablet, Take 1 Tab by mouth three (3) times daily as needed for Muscle Spasm(s). Indications: muscle spasm, Disp: 30 Tab, Rfl: 1    fluticasone propionate (FLONASE) 50 mcg/actuation nasal spray, 2 Sprays by Both Nostrils route daily. , Disp: 1 Bottle, Rfl: 3    Physical Exam:      /75   Pulse 71   Temp 98.4 °F (36.9 °C) (Oral)   Resp 16   Ht 5' 9\" (1.753 m)   Wt 260 lb (117.9 kg)   SpO2 94%   BMI 38.40 kg/m²     General: obese habitus, NAD, conversant  Eyes: sclera clear bilaterally, no discharge noted, eyelids normal in appearance  HENT: NCAT  Lungs: CTAB, normal respiratory effort and rate  CV: RRR, no MRGs  ABD: soft, non-tender, non-distended, normal bowel sounds  Skin: normal temperature, turgor, color, and texture  Psych: alert and oriented to person, place and situation, normal affect  Neuro: speech normal, moving all extremities, gait normal      Assessment/Plan     Chronic Lower Back Pain, Inadequately Controlled:  -Started on Cymbalta regimen  -F/U in one month      HTN, Well Controlled:  -BP at goal of <130/80  -Suspect BP will continue to improve with weight loss  -Will reduce lisinopril dose to 20 mg  -F/U in one month      DMII:  -Suspect issue will resolve given recent gastric bypass  -Will check fructosamine level  -F/U in one month      MO, Improving:  -Has lost 22 lbs since last visit  -BMI down to 38.4 kg/m2 from 41.64 kg/m2  -Will defer to recommendations of bariatric surgery   -F/U in one month      Low Vitamin D level:  -Will continue current vitamin D supplement regimen, per recommendations of bariatric surgery  -Vitamin D level ordered  -F/U in one month        Gustavo Woods MD  12/5/2019, 1:25 PM

## 2019-12-05 NOTE — PROGRESS NOTES
1. Have you been to the ER, urgent care clinic since your last visit? Hospitalized since your last visit? No    2. Have you seen or consulted any other health care providers outside of the [address] [place] [address] since your last visit? Include any pap smears or colon screening.  No

## 2019-12-09 DIAGNOSIS — M54.41 CHRONIC BILATERAL LOW BACK PAIN WITH BILATERAL SCIATICA: ICD-10-CM

## 2019-12-09 DIAGNOSIS — G89.29 CHRONIC BILATERAL LOW BACK PAIN WITH BILATERAL SCIATICA: ICD-10-CM

## 2019-12-09 DIAGNOSIS — M54.42 CHRONIC BILATERAL LOW BACK PAIN WITH BILATERAL SCIATICA: ICD-10-CM

## 2019-12-09 DIAGNOSIS — I10 ESSENTIAL HYPERTENSION WITH GOAL BLOOD PRESSURE LESS THAN 130/80: ICD-10-CM

## 2019-12-09 NOTE — TELEPHONE ENCOUNTER
Patient is requesting the 2 prescriptions Cymbalta and Lisinopril be pulled from the [facility] and sent to [facility].        Pharmacy corrected

## 2019-12-10 RX ORDER — LISINOPRIL 20 MG/1
20 TABLET ORAL DAILY
Qty: 30 TAB | Refills: 5 | Status: SHIPPED | OUTPATIENT
Start: 2019-12-10 | End: 2020-02-24 | Stop reason: ALTCHOICE

## 2019-12-10 RX ORDER — DULOXETIN HYDROCHLORIDE 60 MG/1
60 CAPSULE, DELAYED RELEASE ORAL DAILY
Qty: 30 CAP | Refills: 5 | Status: SHIPPED | OUTPATIENT
Start: 2019-12-10 | End: 2020-02-24 | Stop reason: ALTCHOICE

## 2019-12-13 ENCOUNTER — OFFICE VISIT (OUTPATIENT)
Dept: SURGERY | Age: 54
End: 2019-12-13

## 2019-12-13 VITALS
HEIGHT: 69 IN | HEART RATE: 97 BPM | TEMPERATURE: 96.5 F | RESPIRATION RATE: 20 BRPM | BODY MASS INDEX: 36.88 KG/M2 | WEIGHT: 249 LBS | DIASTOLIC BLOOD PRESSURE: 84 MMHG | SYSTOLIC BLOOD PRESSURE: 115 MMHG

## 2019-12-13 DIAGNOSIS — E66.01 MORBID OBESITY (HCC): Primary | ICD-10-CM

## 2019-12-13 DIAGNOSIS — E11.9 CONTROLLED TYPE 2 DIABETES MELLITUS WITHOUT COMPLICATION, WITHOUT LONG-TERM CURRENT USE OF INSULIN (HCC): ICD-10-CM

## 2019-12-13 RX ORDER — LANOLIN ALCOHOL/MO/W.PET/CERES
CREAM (GRAM) TOPICAL DAILY
COMMUNITY

## 2019-12-13 NOTE — LETTER
12/13/19 Patient: Kalin Diallo YOB: 1965 Date of Visit: 12/13/2019 Pablo Marks MD 
[facility] [phone] VIA In Basket Dear Pablo Mraks MD, [facility] [address] [facility]. My notes for this consultation are attached. If you have questions, please do not hesitate to call me. I look forward to following your patient along with you.  
 
 
Sincerely, 
 
Odalys Daniel MD

## 2019-12-13 NOTE — PROGRESS NOTES
Paul Silva is a 48 y.o. male that presents today to follow up post  LGBP  preformed on 11/26/2019. Pt says pain level is at a 0 on a scale from 1-10. Pt is drinking 64oz of fluid daily. Pt is currently eating protein shake,refried beans,yogurt, cottage cheese, eggs. Pt says the amount of time spent exercising is walking 30 minutes daily. Body mass index is 36.77 kg/m². 1. Have you been to the ER, urgent care clinic since your last visit? Hospitalized since your last visit? No    2. Have you seen or consulted any other health care providers outside of the [address] [place] [address] since your last visit? Include any pap smears or colon screening.  Non

## 2019-12-13 NOTE — PROGRESS NOTES
Subjective:      Joe Severin is a 48 y.o. male is now 2 weeks status post laparoscopic gastric bypass surgery. Doing well overall. Currently on a stage 3 diet without difficulty. Taking in 64oz water,  40 g protein. 30min of activity daily. Bowel movements are constipated. The patient is not having any pain. .  The patient is compliant with multivitamins, calcium and B12 supplements. Weight Loss Metrics 12/13/2019 12/13/2019 12/5/2019 11/26/2019 11/25/2019 11/4/2019 11/4/2019   Pre op / Initial Wt 289 - - - - 289 -   Today's Wt - 249 lb 260 lb - 271 lb - 289 lb   BMI - 36.77 kg/m2 38.4 kg/m2 40.02 kg/m2 - - 42.68 kg/m2   Ideal Body Wt 151 - - - - 151 -   Excess Body Wt 138 - - - - 138 -   Goal Wt 179 - - - - 179 -   Wt loss to date 40 - - - - 0 -   % Wt Loss 0.36 - - - - 0 -   80% .4 - - - - 110.4 -       Body mass index is 36.77 kg/m².      Comorbidities:    Hypertension: improved  Diabetes: improved  Obstructive Sleep Apnea: improved  Hyperlipidemia: improved  Stress Urinary Incontinence: not applicable  Gastroesophageal Reflux: improved  Weight related arthropathy:not applicable        Past Medical History:   Diagnosis Date    Adverse effect of anesthesia     brother- BP drops after surgery-difficult to wake    Back pain     Diabetes mellitus, type II ([address]     Diverticulitis     Erectile dysfunction     GERD (gastroesophageal reflux disease)     Hypertension     Kidney stones     Nephrolithiasis     Osteoarthritis     Pneumonia     Psoriatic arthritis ([address]     Psoriatic arthritis, 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 Mary Wasserman, rheum    Renal calculus, right     Snores     Swallowing difficulty     Ureteral calculi        Past Surgical History:   Procedure Laterality Date    HX CHOLECYSTECTOMY      2002    HX COLECTOMY  2017    HX COLONOSCOPY  02.22.2016    HX GASTRIC BYPASS  11/26/2019    HX HERNIA REPAIR  87/[id number]    Umbilical    HX LAP GASTRIC BYPASS 11/26/2019    HX UROLOGICAL  06/06/2016    [facility]-Cystoscopy, CYSTOURETHROSCOPY W/ INDWELLING URETERAL STENT INSERTION, Dr Yael Serrano HX UROLOGICAL  05/29/2016    [facility]- Cystoscopy, Right retrograde pyelography, Right 6 x 26 cm double-J ureteral stent placement,Intraoperative fluoro less than 1 hour,Interpretation of Urography, Dr Lamar Jansen PARTIAL HIP REPLACEMENT Left        Current Outpatient Medications   Medication Sig Dispense Refill    thiamine HCL (B-1) 100 mg tablet Take  by mouth daily.  hyoscyamine sulfate (CYSTOSPAZ) 0.125 mg tablet 1 Tab by SubLINGual route every six (6) hours as needed for Other (Spasms). 15 Tab 0    cholecalciferol (VITAMIN D3) 25 mcg (1,000 unit) cap Take  by mouth daily. liquid      cyanocobalamin (VITAMIN B-12) 1,000 mcg tablet Take 1,000 mcg by mouth daily.  pediatric multivitamins chewable tablet Take 1 Tab by mouth daily.  traZODone (DESYREL) 150 mg tablet Take 1 Tab by mouth nightly. 30 Tab 3    sildenafil citrate (VIAGRA) 100 mg tablet take 1 tablet 1 hour prior to intercourse 10 Tab 3    adalimumab (HUMIRA) 40 mg/0.8 mL injection by SubCUTAneous route once.  pravastatin (PRAVACHOL) 40 mg tablet TAKE 1 TABLET BY MOUTH ONCE DAILY AT BEDTIME FOR CHOLESTEROL 90 Tab 0    cyclobenzaprine (FLEXERIL) 10 mg tablet Take 1 Tab by mouth three (3) times daily as needed for Muscle Spasm(s). Indications: muscle spasm 30 Tab 1    hydrOXYzine HCl (ATARAX) 50 mg tablet Take 1 Tab by mouth every six (6) hours as needed for Anxiety. 180 Tab 1    omeprazole (PRILOSEC OTC) 20 mg tablet Take 20 mg by mouth daily.  lisinopril (PRINIVIL, ZESTRIL) 20 mg tablet Take 1 Tab by mouth daily. 30 Tab 5    DULoxetine (CYMBALTA) 60 mg capsule Take 1 Cap by mouth daily. 30 Cap 5    enoxaparin (LOVENOX) 40 mg/0.4 mL 0.4 mL by SubCUTAneous route daily. 7 Syringe 0    fexofenadine (ALLEGRA) 180 mg tablet Take 1 Tab by mouth daily.  90 Tab 3    gabapentin (NEURONTIN) 600 mg tablet Take 1 Tab by mouth three (3) times daily. 90 Tab 2    fluticasone propionate (FLONASE) 50 mcg/actuation nasal spray 2 Sprays by Both Nostrils route daily. 1 Bottle 3       Allergies   Allergen Reactions    Medrol [Methylprednisolone] Other (comments)     tachycardia  Patient states he had a severe allergic reaction about 15 years ago.  Lipitor [Atorvastatin] Other (comments)     Muscle cramping    Prednisone Other (comments)     tachycardia    Hydrocodone-Acetaminophen Itching         Objective:     Visit Vitals  /84 (BP 1 Location: Left arm, BP Patient Position: At rest)   Pulse 97   Temp 96.5 °F (35.8 °C) (Oral)   Resp 20   Ht 5' 9\" (1.753 m)   Wt 112.9 kg (249 lb)   BMI 36.77 kg/m²       General:  alert, cooperative, no distress, appears stated age   Chest: lungs clear to auscultation, no accessory muscle use   Cor:   Regular rate and rhythm   Abdomen: soft, bowel sounds active, non-tender, large LLQ hernia persists. Incision:   healing well, no drainage, no erythema, no hernia, no seroma, no swelling, no dehiscence, incision well approximated       Labs: none    Assessment:     Doing well postoperatively.     Plan:     Increase activity to the goal of 30 minutes daily, Follow up labs as ordered, Increase fluids, Follow up with Registered Dietician and Continue MVI/Ca/B12 supplementation  Follow up in 3 months

## 2019-12-30 DIAGNOSIS — R79.89 LOW VITAMIN D LEVEL: ICD-10-CM

## 2019-12-30 DIAGNOSIS — E11.9 CONTROLLED TYPE 2 DIABETES MELLITUS WITHOUT COMPLICATION, WITHOUT LONG-TERM CURRENT USE OF INSULIN (HCC): ICD-10-CM

## 2020-01-06 ENCOUNTER — DOCUMENTATION ONLY (OUTPATIENT)
Dept: SURGERY | Age: 55
End: 2020-01-06

## 2020-01-06 NOTE — PROGRESS NOTES
[facility]  POST-OP NUTRITION FOLLOW UP    Patient's Name: Lavelle Alcaraz  YOB: 1965  Surgery Date: 11/26/2019      Procedure: LGBP    Surgeon: Rasta Corley    Height: 5'9\"     Pre-Op Weight: 289    Current Weight: 237  Weight Lost: 98#      Complications  Readmittance: no  Reoperations: no  Complications: no  IV Fluids: no  ER Trips: no    Problem Areas:   Nausea: yes a little   Vomiting: 3 vomited   Dumping Syndrome: no  Inadequate Protein: no  Inadequate Fluids: no  Food Intolerance: no  Hunger: yes, a few times. Constipation: a lot, on Miralax    Eating 3 Meals/Day:yes  Portion Size at Meals: 1-2-3 oz     Protein from Food: 30 grams    Foods being consumed:  Breakfast: Time:7:00-9:00  egg  Lunch: Time: 12-1:00   Vegetarian refried beans. Dinner:  Time: 5:00   Chicken. In-between eating: protein drink. Length of time for meals: 20-30.  food/fluids: yes    Fluids: >64 oz/day   Types of Fluids: water. MVI: Flinstones chewable. Number/Day: 2   Taken Separately:  yes    Vitamin B1: 100 mg daily  Dosing: daily    Calcium: 500 mg    Calcium Dosing: daily    Taken Separately: yes    Vitamin B12: 1000 mcg   Vitamin B12 Dosing: daily    Vitamin D: 5000     Vitamin D dosing: daily       Protein Supplement: Premier     Grams of Protein: 30  Patient is taking 7 days a week. Exercise: walking 1-2 miles per day. Comments:    Patient is doing very well. Patient was educated on the importance of eating meat and vegetables only. I have talked with patient about the effects of carbohydrates, not only from a weight management perspective, but also what effects it could have on their blood sugar and what reactive hypoglycemia is. Diet Follow Up:  9 month class.     Kimi Abdi, LEFTY    1/6/2020

## 2020-01-07 ENCOUNTER — OFFICE VISIT (OUTPATIENT)
Dept: FAMILY MEDICINE CLINIC | Age: 55
End: 2020-01-07

## 2020-01-07 VITALS
WEIGHT: 238 LBS | HEIGHT: 69 IN | RESPIRATION RATE: 18 BRPM | HEART RATE: 71 BPM | OXYGEN SATURATION: 97 % | SYSTOLIC BLOOD PRESSURE: 120 MMHG | TEMPERATURE: 95.9 F | BODY MASS INDEX: 35.25 KG/M2 | DIASTOLIC BLOOD PRESSURE: 75 MMHG

## 2020-01-07 DIAGNOSIS — J06.9 UPPER RESPIRATORY TRACT INFECTION, UNSPECIFIED TYPE: Primary | ICD-10-CM

## 2020-01-07 DIAGNOSIS — Z98.84 S/P GASTRIC BYPASS: ICD-10-CM

## 2020-01-07 DIAGNOSIS — M62.838 MUSCLE SPASM: ICD-10-CM

## 2020-01-07 DIAGNOSIS — E78.5 HYPERLIPIDEMIA, UNSPECIFIED HYPERLIPIDEMIA TYPE: ICD-10-CM

## 2020-01-07 RX ORDER — BENZONATATE 200 MG/1
200 CAPSULE ORAL
Qty: 30 CAP | Refills: 0 | Status: SHIPPED | OUTPATIENT
Start: 2020-01-07 | End: 2020-01-14

## 2020-01-07 RX ORDER — CYCLOBENZAPRINE HCL 10 MG
10 TABLET ORAL
Qty: 30 TAB | Refills: 1 | Status: SHIPPED | OUTPATIENT
Start: 2020-01-07 | End: 2020-03-15

## 2020-01-07 RX ORDER — POLYETHYLENE GLYCOL 3350 17 G/17G
17 POWDER, FOR SOLUTION ORAL DAILY
Qty: 30 EACH | Refills: 5 | Status: SHIPPED | OUTPATIENT
Start: 2020-01-07

## 2020-01-07 RX ORDER — OXYMETAZOLINE HCL 0.05 %
2 SPRAY, NON-AEROSOL (ML) NASAL 2 TIMES DAILY
Qty: 1 EACH | Refills: 0 | Status: SHIPPED | OUTPATIENT
Start: 2020-01-07 | End: 2020-01-10

## 2020-01-07 RX ORDER — PRAVASTATIN SODIUM 40 MG/1
40 TABLET ORAL
Qty: 90 TAB | Refills: 2 | Status: SHIPPED | OUTPATIENT
Start: 2020-01-07 | End: 2020-02-24 | Stop reason: ALTCHOICE

## 2020-01-07 RX ORDER — AZELASTINE 1 MG/ML
1 SPRAY, METERED NASAL 2 TIMES DAILY
Qty: 1 BOTTLE | Refills: 0 | Status: SHIPPED | OUTPATIENT
Start: 2020-01-07 | End: 2020-02-24 | Stop reason: ALTCHOICE

## 2020-01-07 NOTE — PROGRESS NOTES
Cadence Brantley is a 48 y.o. male presents in office for    Chief Complaint   Patient presents with    Cold Symptoms     chest & head congestion x1 day        Visit Vitals  /75 (BP 1 Location: Right arm, BP Patient Position: Sitting)   Pulse 71   Temp 95.9 °F (35.5 °C) (Oral)   Resp 18   Ht 5' 9\" (1.753 m)   Wt 238 lb (108 kg)   SpO2 97%   BMI 35.15 kg/m²         Health Maintenance Due   Topic Date Due    EYE EXAM RETINAL OR DILATED  01/23/1977    FOBT Q 1 YEAR AGE 50-75  01/23/2017    MICROALBUMIN Q1  08/01/2019             1. Have you been to the ER, urgent care clinic since your last visit? Hospitalized since your last visit? No    2. Have you seen or consulted any other health care providers outside of the [address] [place] [address] since your last visit? Include any pap smears or colon screening. No    3 most recent PHQ Screens 1/7/2020   Little interest or pleasure in doing things Not at all   Feeling down, depressed, irritable, or hopeless Not at all   Total Score PHQ 2 0       Abuse Screening Questionnaire 1/7/2020   Do you ever feel afraid of your partner? N   Are you in a relationship with someone who physically or mentally threatens you? N   Is it safe for you to go home? Y       Fall Risk Assessment, last 12 mths 1/7/2020   Able to walk? Yes   Fall in past 12 months?  No       Learning Assessment 5/20/2019   PRIMARY LEARNER Patient   HIGHEST LEVEL OF EDUCATION - PRIMARY LEARNER  -   BARRIERS PRIMARY LEARNER -   CO-LEARNER CAREGIVER -   PRIMARY LANGUAGE ENGLISH   LEARNER PREFERENCE PRIMARY READING   ANSWERED BY self   RELATIONSHIP SELF

## 2020-01-07 NOTE — PATIENT INSTRUCTIONS

## 2020-01-07 NOTE — PROGRESS NOTES
[facility]    CC: Chest Discomfort    HPI:      Chest Discomfort:  - Timing/onset: Issue started ~1/5/2020  - Quality: [place]  - Associated Symptoms/signs: Productive cough, chills, nasal congestion, smells bleach, and diarrhea  - Exposures: Multiple sick contacts at [employer]-day party      ROS: Positive items marked in RED  CON: fever, chills  Cardiovascular: palpitations, CP  Resp: SOB, cough  GI: nausea, vomiting, diarrhea  : dysuria, hematuria      Past Medical History:   Diagnosis Date    Adverse effect of anesthesia     brother- BP drops after surgery-difficult to wake    Back pain     Diabetes mellitus, type II ([address]     Diverticulitis     Erectile dysfunction     GERD (gastroesophageal reflux disease)     Hypertension     Kidney stones     Morbid obesity ([address]     Nephrolithiasis     Osteoarthritis     Pneumonia     Psoriatic arthritis ([address]     Psoriatic arthritis, 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 Neida Stuart, rheum    Renal calculus, right     Snores     Swallowing difficulty     Ureteral calculi        Past Surgical History:   Procedure Laterality Date    HX CHOLECYSTECTOMY      2002    HX COLECTOMY  2017    HX COLONOSCOPY  02.22.2016    HX HERNIA REPAIR  78/69/4639    Umbilical    HX LAP GASTRIC BYPASS  11/26/2019    padmini-en-y    HX UROLOGICAL  06/06/2016    [facility]-Cystoscopy, CYSTOURETHROSCOPY W/ INDWELLING URETERAL STENT INSERTION, Dr Spencer Hines HX UROLOGICAL  05/29/2016    [facility] Cystoscopy, Right retrograde pyelography, Right 6 x 26 cm double-J ureteral stent placement,Intraoperative fluoro less than 1 hour,Interpretation of Urography, Dr Ricky Davis Left        Family History   Problem Relation Age of Onset    Cancer Mother         Lung    Hypertension Mother     Stroke Mother     Cancer Father         Brain    Heart Attack Father        Social History     Socioeconomic History    Marital status:      Spouse name: Not on file    Number of children: Not on file    Years of education: Not on file    Highest education level: Not on file   Tobacco Use    Smoking status: Never Smoker    Smokeless tobacco: Never Used   Substance and Sexual Activity    Alcohol use: Yes     Alcohol/week: 0.0 standard drinks     Comment: occ    Drug use: No    Sexual activity: Yes     Partners: Female       Allergies   Allergen Reactions    Medrol [Methylprednisolone] Other (comments)     tachycardia  Patient states he had a severe allergic reaction about 15 years ago.  Lipitor [Atorvastatin] Other (comments)     Muscle cramping    Prednisone Other (comments)     tachycardia    Hydrocodone-Acetaminophen Itching         Current Outpatient Medications:     thiamine HCL (B-1) 100 mg tablet, Take  by mouth daily. , Disp: , Rfl:     cholecalciferol (VITAMIN D3) 25 mcg (1,000 unit) cap, Take  by mouth daily. liquid, Disp: , Rfl:     cyanocobalamin (VITAMIN B-12) 1,000 mcg tablet, Take 1,000 mcg by mouth daily. , Disp: , Rfl:     pediatric multivitamins chewable tablet, Take 1 Tab by mouth daily. , Disp: , Rfl:     traZODone (DESYREL) 150 mg tablet, Take 1 Tab by mouth nightly., Disp: 30 Tab, Rfl: 3    fexofenadine (ALLEGRA) 180 mg tablet, Take 1 Tab by mouth daily. , Disp: 90 Tab, Rfl: 3    adalimumab (HUMIRA) 40 mg/0.8 mL injection, by SubCUTAneous route once., Disp: , Rfl:     pravastatin (PRAVACHOL) 40 mg tablet, TAKE 1 TABLET BY MOUTH ONCE DAILY AT BEDTIME FOR CHOLESTEROL, Disp: 90 Tab, Rfl: 0    cyclobenzaprine (FLEXERIL) 10 mg tablet, Take 1 Tab by mouth three (3) times daily as needed for Muscle Spasm(s). Indications: muscle spasm, Disp: 30 Tab, Rfl: 1    gabapentin (NEURONTIN) 600 mg tablet, Take 1 Tab by mouth three (3) times daily. , Disp: 90 Tab, Rfl: 2    hydrOXYzine HCl (ATARAX) 50 mg tablet, Take 1 Tab by mouth every six (6) hours as needed for Anxiety. , Disp: 180 Tab, Rfl: 1    lisinopril (PRINIVIL, ZESTRIL) 20 mg tablet, Take 1 Tab by mouth daily. , Disp: 30 Tab, Rfl: 5    DULoxetine (CYMBALTA) 60 mg capsule, Take 1 Cap by mouth daily. , Disp: 30 Cap, Rfl: 5    hyoscyamine sulfate (CYSTOSPAZ) 0.125 mg tablet, 1 Tab by SubLINGual route every six (6) hours as needed for Other (Spasms). , Disp: 15 Tab, Rfl: 0    sildenafil citrate (VIAGRA) 100 mg tablet, take 1 tablet 1 hour prior to intercourse, Disp: 10 Tab, Rfl: 3    omeprazole (PRILOSEC OTC) 20 mg tablet, Take 20 mg by mouth daily. , Disp: , Rfl:     fluticasone propionate (FLONASE) 50 mcg/actuation nasal spray, 2 Sprays by Both Nostrils route daily. , Disp: 1 Bottle, Rfl: 3    Physical Exam:      /75 (BP 1 Location: Right arm, BP Patient Position: Sitting)   Pulse 71   Temp 95.9 °F (35.5 °C) (Oral)   Resp 18   Ht 5' 9\" (1.753 m)   Wt 238 lb (108 kg)   SpO2 97%   BMI 35.15 kg/m²     General: obese habitus, NAD, conversant  Eyes: sclera clear bilaterally, no discharge noted, eyelids normal in appearance  HENT: NCAT, nasal turbinates enlarged bilaterally, oropharynx clear, MMM  Lungs: CTAB, normal respiratory effort and rate  CV: RRR, no MRGs  ABD: soft, non-tender, non-distended, normal bowel sounds  Skin: normal temperature, turgor, color, and texture  Psych: alert and oriented to person, place and situation, normal affect  Neuro: speech normal, moving all extremities, gait normal      Assessment/Plan     URI:  -Likely 2/2 viral etiology  -Counseled on recommendations for symptomatic treatment  -Started on Afrin, azelastine, and Tessalon regimen  -Handout given on URI care  -F/U as needed if symptoms worsen or fail to improve         Brisa Duffy MD  1/7/2020, 10:29 AM

## 2020-01-09 LAB — FRUCTOSAMINE, 100808: 238 UMOL/L (ref 0–285)

## 2020-01-14 ENCOUNTER — OFFICE VISIT (OUTPATIENT)
Dept: FAMILY MEDICINE CLINIC | Age: 55
End: 2020-01-14

## 2020-01-14 VITALS
OXYGEN SATURATION: 95 % | TEMPERATURE: 96.2 F | HEART RATE: 84 BPM | BODY MASS INDEX: 33.92 KG/M2 | WEIGHT: 229 LBS | DIASTOLIC BLOOD PRESSURE: 89 MMHG | HEIGHT: 69 IN | RESPIRATION RATE: 14 BRPM | SYSTOLIC BLOOD PRESSURE: 120 MMHG

## 2020-01-14 DIAGNOSIS — E78.5 HYPERLIPIDEMIA, UNSPECIFIED HYPERLIPIDEMIA TYPE: ICD-10-CM

## 2020-01-14 DIAGNOSIS — R05.9 COUGH: ICD-10-CM

## 2020-01-14 DIAGNOSIS — J40 BRONCHITIS: Primary | ICD-10-CM

## 2020-01-14 RX ORDER — ALBUTEROL SULFATE 90 UG/1
2 AEROSOL, METERED RESPIRATORY (INHALATION)
Qty: 1 INHALER | Refills: 0 | Status: SHIPPED | OUTPATIENT
Start: 2020-01-14 | End: 2020-02-24 | Stop reason: ALTCHOICE

## 2020-01-14 RX ORDER — ALBUTEROL SULFATE 90 UG/1
2 AEROSOL, METERED RESPIRATORY (INHALATION)
Qty: 1 INHALER | Refills: 0 | Status: SHIPPED | OUTPATIENT
Start: 2020-01-14 | End: 2020-01-14 | Stop reason: SDUPTHER

## 2020-01-14 NOTE — PROGRESS NOTES
1. Have you been to the ER, urgent care clinic since your last visit? Hospitalized since your last visit? No    2. Have you seen or consulted any other health care providers outside of the [address] [place] [address] since your last visit? Include any pap smears or colon screening.  No

## 2020-01-14 NOTE — PROGRESS NOTES
Brayden Miller    CC: Follow-up of URI    HPI:     URI:  -Taking medications as prescribed  -Reports that he continues to have a productive cough  -Has also had issues with wheezing  -Wishes to have an x-ray      ROS: Positive items marked in RED  CON: fever, chills  Cardiovascular: palpitations, CP  Resp: SOB, cough  GI: nausea, vomiting, diarrhea  : dysuria, hematuria      Past Medical History:   Diagnosis Date    Adverse effect of anesthesia     brother- BP drops after surgery-difficult to wake    Back pain     Diabetes mellitus, type II ([address]     Diverticulitis     Erectile dysfunction     GERD (gastroesophageal reflux disease)     Hypertension     Kidney stones     Morbid obesity ([address]     Nephrolithiasis     Osteoarthritis     Pneumonia     Psoriatic arthritis ([address]     Psoriatic arthritis, 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 Mattie Beard, rheum    Renal calculus, right     Snores     Swallowing difficulty     Ureteral calculi        Past Surgical History:   Procedure Laterality Date    HX CHOLECYSTECTOMY      2002    HX COLECTOMY  2017    HX COLONOSCOPY  02.22.2016    HX HERNIA REPAIR  46/72/8655    Umbilical    HX LAP GASTRIC BYPASS  11/26/2019    padmini-en-y    HX UROLOGICAL  06/06/2016    [facility]-Cystoscopy, CYSTOURETHROSCOPY W/ INDWELLING URETERAL STENT INSERTION, Dr Yayo Melendez HX UROLOGICAL  05/29/2016    [facility]- Cystoscopy, Right retrograde pyelography, Right 6 x 26 cm double-J ureteral stent placement,Intraoperative fluoro less than 1 hour,Interpretation of Urography, Dr Cristal Kawasaki Left        Family History   Problem Relation Age of Onset    Cancer Mother         Lung    Hypertension Mother     Stroke Mother     Cancer Father         Brain    Heart Attack Father        Social History     Socioeconomic History    Marital status:      Spouse name: Not on file    Number of children: Not on file    Years of education: Not on file  Highest education level: Not on file   Tobacco Use    Smoking status: Never Smoker    Smokeless tobacco: Never Used   Substance and Sexual Activity    Alcohol use: Yes     Alcohol/week: 0.0 standard drinks     Comment: occ    Drug use: No    Sexual activity: Yes     Partners: Female       Allergies   Allergen Reactions    Medrol [Methylprednisolone] Other (comments)     tachycardia  Patient states he had a severe allergic reaction about 15 years ago.  Lipitor [Atorvastatin] Other (comments)     Muscle cramping    Prednisone Other (comments)     tachycardia    Hydrocodone-Acetaminophen Itching         Current Outpatient Medications:     azelastine (ASTELIN) 137 mcg (0.1 %) nasal spray, 1 [place] by Both Nostrils route two (2) times a day. Use in each nostril as directed, Disp: 1 Bottle, Rfl: 0    benzonatate (TESSALON) 200 mg capsule, Take 1 Cap by mouth three (3) times daily as needed for Cough for up to 7 days. , Disp: 30 Cap, Rfl: 0    cyclobenzaprine (FLEXERIL) 10 mg tablet, Take 1 Tab by mouth three (3) times daily as needed for Muscle Spasm(s). Indications: muscle spasm, Disp: 30 Tab, Rfl: 1    polyethylene glycol (MIRALAX) 17 gram packet, Take 1 Packet by mouth daily. , Disp: 30 Each, Rfl: 5    pravastatin (PRAVACHOL) 40 mg tablet, Take 1 Tab by mouth nightly. Indications: excessive fat in the blood, Disp: 90 Tab, Rfl: 2    thiamine HCL (B-1) 100 mg tablet, Take  by mouth daily. , Disp: , Rfl:     lisinopril (PRINIVIL, ZESTRIL) 20 mg tablet, Take 1 Tab by mouth daily. , Disp: 30 Tab, Rfl: 5    DULoxetine (CYMBALTA) 60 mg capsule, Take 1 Cap by mouth daily. , Disp: 30 Cap, Rfl: 5    hyoscyamine sulfate (CYSTOSPAZ) 0.125 mg tablet, 1 Tab by SubLINGual route every six (6) hours as needed for Other (Spasms). , Disp: 15 Tab, Rfl: 0    cholecalciferol (VITAMIN D3) 25 mcg (1,000 unit) cap, Take  by mouth daily.  liquid, Disp: , Rfl:     cyanocobalamin (VITAMIN B-12) 1,000 mcg tablet, Take 1,000 mcg by mouth daily. , Disp: , Rfl:     pediatric multivitamins chewable tablet, Take 1 Tab by mouth daily. , Disp: , Rfl:     traZODone (DESYREL) 150 mg tablet, Take 1 Tab by mouth nightly., Disp: 30 Tab, Rfl: 3    fexofenadine (ALLEGRA) 180 mg tablet, Take 1 Tab by mouth daily. , Disp: 90 Tab, Rfl: 3    sildenafil citrate (VIAGRA) 100 mg tablet, take 1 tablet 1 hour prior to intercourse, Disp: 10 Tab, Rfl: 3    adalimumab (HUMIRA) 40 mg/0.8 mL injection, by SubCUTAneous route once., Disp: , Rfl:     gabapentin (NEURONTIN) 600 mg tablet, Take 1 Tab by mouth three (3) times daily. , Disp: 90 Tab, Rfl: 2    hydrOXYzine HCl (ATARAX) 50 mg tablet, Take 1 Tab by mouth every six (6) hours as needed for Anxiety. , Disp: 180 Tab, Rfl: 1    omeprazole (PRILOSEC OTC) 20 mg tablet, Take 20 mg by mouth daily. , Disp: , Rfl:     fluticasone propionate (FLONASE) 50 mcg/actuation nasal spray, 2 Sprays by Both Nostrils route daily. , Disp: 1 Bottle, Rfl: 3    Physical Exam:      /89   Pulse 84   Temp 96.2 °F (35.7 °C) (Oral)   Resp 14   Ht 5' 9\" (1.753 m)   Wt 229 lb (103.9 kg)   SpO2 95%   BMI 33.82 kg/m²     General: obese habitus, NAD, conversant  Eyes: sclera clear bilaterally, no discharge noted, eyelids normal in appearance  HENT: NCAT  Lungs: CTAB, normal respiratory effort and rate  CV: RRR, no MRGs  ABD: soft, non-tender, non-distended, normal bowel sounds  Skin: normal temperature, turgor, color, and texture  Psych: alert and oriented to person, place and situation, normal affect  Neuro: speech normal, moving all extremities, gait normal      Assessment/Plan     Bronchitis:  -Likely viral etiology  -Started on albuterol and pseudoephedrine-DM-GG-acetaminophen regimen  -CXR ordered  -Follow-up in 1 week        Brodie Patel MD  1/14/2020, 12:44 PM

## 2020-02-18 ENCOUNTER — TELEPHONE (OUTPATIENT)
Dept: SURGERY | Age: 55
End: 2020-02-18

## 2020-02-18 DIAGNOSIS — E66.01 MORBID OBESITY (HCC): ICD-10-CM

## 2020-02-18 DIAGNOSIS — I10 ESSENTIAL HYPERTENSION: ICD-10-CM

## 2020-02-18 DIAGNOSIS — K91.2 POST-RESECTION MALABSORPTION: Primary | ICD-10-CM

## 2020-02-20 ENCOUNTER — HOSPITAL ENCOUNTER (OUTPATIENT)
Dept: LAB | Age: 55
Discharge: HOME | End: 2020-02-20

## 2020-02-20 LAB — SENTARA SPECIMEN COL,SENBCF: NORMAL

## 2020-02-20 PROCEDURE — 99001 SPECIMEN HANDLING PT-LAB: CPT

## 2020-02-21 LAB
25(OH)D3 SERPL-MCNC: 74.8 NG/ML (ref 32–100)
ABSOLUTE LYMPHOCYTE COUNT, 10803: 0.9 K/UL (ref 1–4.8)
ALBUMIN SERPL-MCNC: 4.5 G/DL (ref 3.5–5)
ANION GAP SERPL CALC-SCNC: 13 MMOL/L
BASOPHILS # BLD: 0.1 K/UL (ref 0–0.2)
BASOPHILS NFR BLD: 2 % (ref 0–2)
BUN SERPL-MCNC: 16 MG/DL (ref 6–22)
CALCIUM SERPL-MCNC: 9.8 MG/DL (ref 8.4–10.5)
CHLORIDE SERPL-SCNC: 102 MMOL/L (ref 98–110)
CO2 SERPL-SCNC: 24 MMOL/L (ref 20–32)
CREAT SERPL-MCNC: 0.8 MG/DL (ref 0.5–1.2)
EOSINOPHIL # BLD: 0.2 K/UL (ref 0–0.5)
EOSINOPHIL NFR BLD: 5 % (ref 0–6)
ERYTHROCYTE [DISTWIDTH] IN BLOOD BY AUTOMATED COUNT: 13.3 % (ref 10–15.5)
FERRITIN SERPL-MCNC: 98 NG/ML (ref 22–322)
FOLATE,FOL: >20 NG/ML
GFRAA, 66117: >60
GFRNA, 66118: >60
GLUCOSE SERPL-MCNC: 102 MG/DL (ref 70–99)
GRANULOCYTES,GRANS: 63 % (ref 40–75)
HCT VFR BLD AUTO: 45.2 % (ref 39.3–51.6)
HGB BLD-MCNC: 14.8 G/DL (ref 13.1–17.2)
IRON,IRN: 78 MCG/DL (ref 45–160)
LYMPHOCYTES, LYMLT: 22 % (ref 20–45)
MCH RBC QN AUTO: 29 PG (ref 26–34)
MCHC RBC AUTO-ENTMCNC: 33 G/DL (ref 31–36)
MCV RBC AUTO: 88 FL (ref 80–95)
MONOCYTES # BLD: 0.3 K/UL (ref 0.1–1)
MONOCYTES NFR BLD: 8 % (ref 3–12)
NEUTROPHILS # BLD AUTO: 2.6 K/UL (ref 1.8–7.7)
PLATELET # BLD AUTO: 210 K/UL (ref 140–440)
PMV BLD AUTO: 11.9 FL (ref 9–13)
POTASSIUM SERPL-SCNC: 4.4 MMOL/L (ref 3.5–5.5)
RBC # BLD AUTO: 5.12 M/UL (ref 3.8–5.8)
SODIUM SERPL-SCNC: 139 MMOL/L (ref 133–145)
VIT B12 SERPL-MCNC: >2000 PG/ML (ref 211–911)
WBC # BLD AUTO: 4.1 K/UL (ref 4–11)

## 2020-02-24 ENCOUNTER — OFFICE VISIT (OUTPATIENT)
Dept: SURGERY | Age: 55
End: 2020-02-24

## 2020-02-24 VITALS
DIASTOLIC BLOOD PRESSURE: 77 MMHG | WEIGHT: 212.4 LBS | SYSTOLIC BLOOD PRESSURE: 115 MMHG | HEIGHT: 69 IN | BODY MASS INDEX: 31.46 KG/M2 | TEMPERATURE: 96.2 F | OXYGEN SATURATION: 96 % | HEART RATE: 74 BPM

## 2020-02-24 DIAGNOSIS — R10.84 GENERALIZED ABDOMINAL PAIN: ICD-10-CM

## 2020-02-24 DIAGNOSIS — R11.2 NAUSEA AND VOMITING, INTRACTABILITY OF VOMITING NOT SPECIFIED, UNSPECIFIED VOMITING TYPE: ICD-10-CM

## 2020-02-24 DIAGNOSIS — R13.10 DYSPHAGIA, UNSPECIFIED TYPE: ICD-10-CM

## 2020-02-24 DIAGNOSIS — K91.2 POST-RESECTION MALABSORPTION: Primary | ICD-10-CM

## 2020-02-24 DIAGNOSIS — K21.9 GASTROESOPHAGEAL REFLUX DISEASE WITHOUT ESOPHAGITIS: ICD-10-CM

## 2020-02-24 LAB — VITAMIN B1, WHOLE BLOOD, 66250: 195.9 NMOL/L (ref 66.5–200)

## 2020-02-24 RX ORDER — OXYCODONE AND ACETAMINOPHEN 7.5; 325 MG/1; MG/1
TABLET ORAL
COMMUNITY
End: 2021-09-15

## 2020-02-24 NOTE — PROGRESS NOTES
Kat Simon is a 50 y.o. male (: 1965) presenting to address:    Chief Complaint   Patient presents with    Follow-up     LGBP 19       Medication list and allergies have been reviewed with Kat [place] and updated as of today's date. I have gone over all Medical, Surgical and Social History with Kat [place] and updated/added the information accordingly. 1. Have you been to the ER, Urgent Care or Hospitalized since your last visit? YES, constipation      2. Have you followed up with your PCP or any other Physicians since your procedure/ last office visit?    YES

## 2020-02-24 NOTE — PROGRESS NOTES
Bariatric Postoperative Progress Note    Addison Liao is a 50 y.o. male is now 3 months status post laparoscopic gastric bypass surgery. Currently on a stage 4 diet without difficulty. Taking in 50-60oz water,  40-60g protein. Minimal activity. Bowel movements are chronically constipated. He reports he cannot tolerate veggies and is vomiting daily. C/o left sided abdominal pain r/t existing hernia . He is compliant with multivitamins, calcium, Vit D, B1, and B12 supplements.     Weight Loss Metrics 2/24/2020 2/24/2020 1/14/2020 1/7/2020 12/13/2019 12/13/2019 12/5/2019   Pre op / Initial Wt 271 - - - 289 - -   Today's Wt - 212 lb 6.4 oz 229 lb 238 lb - 249 lb 260 lb   BMI - 31.37 kg/m2 33.82 kg/m2 35.15 kg/m2 - 36.77 kg/m2 38.4 kg/m2   Ideal Body Wt 154 - - - 151 - -   Excess Body Wt 117 - - - 138 - -   Goal Wt 178 - - - 179 - -   Wt loss to date 58.6 - - - 40 - -   % Wt Loss 0.63 - - - 0.36 - -   80% EBW 93.6 - - - 110.4 - -     Comorbidities:  Hypertension: improved  Diabetes: improved  Obstructive Sleep Apnea: improved  Hyperlipidemia: improved  Stress Urinary Incontinence: not applicable  Gastroesophageal Reflux: improved  Weight related arthropathy:not applicable    Past Medical History:   Diagnosis Date    Adverse effect of anesthesia     brother- BP drops after surgery-difficult to wake    Back pain     Diabetes mellitus, type II (HCC)     Diverticulitis     Erectile dysfunction     GERD (gastroesophageal reflux disease)     Hypertension     Kidney stones     Morbid obesity (HCC)     Nephrolithiasis     Osteoarthritis     Pneumonia     Psoriatic arthritis ([address]     Psoriatic arthritis, 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 Delvin Cherry, rheum    Renal calculus, right     Snores     Swallowing difficulty     Ureteral calculi        Past Surgical History:   Procedure Laterality Date    HX CHOLECYSTECTOMY      2002    HX COLECTOMY  2017    HX COLONOSCOPY  02.22.2016    HX HERNIA REPAIR  57/41/3351    Umbilical    HX LAP GASTRIC BYPASS  11/26/2019    padmini-en-y    HX UROLOGICAL  06/06/2016    [facility]-Cystoscopy, CYSTOURETHROSCOPY W/ INDWELLING URETERAL STENT INSERTION, Dr Oren Neff  UROLOGICAL  05/29/2016    [facility]- Cystoscopy, Right retrograde pyelography, Right 6 x 26 cm double-J ureteral stent placement,Intraoperative fluoro less than 1 hour,Interpretation of Urography, Dr Padmini Magdaleno Left        Current Outpatient Medications   Medication Sig Dispense Refill    oxyCODONE-acetaminophen (PERCOCET) 7.5-325 mg per tablet Take  by mouth.  cyclobenzaprine (FLEXERIL) 10 mg tablet Take 1 Tab by mouth three (3) times daily as needed for Muscle Spasm(s). Indications: muscle spasm 30 Tab 1    polyethylene glycol (MIRALAX) 17 gram packet Take 1 Packet by mouth daily. 30 Each 5    thiamine HCL (B-1) 100 mg tablet Take  by mouth daily.  cholecalciferol (VITAMIN D3) 25 mcg (1,000 unit) cap Take  by mouth daily. liquid      cyanocobalamin (VITAMIN B-12) 1,000 mcg tablet Take 1,000 mcg by mouth daily.  pediatric multivitamins chewable tablet Take 1 Tab by mouth daily.  traZODone (DESYREL) 150 mg tablet Take 1 Tab by mouth nightly. 30 Tab 3    fexofenadine (ALLEGRA) 180 mg tablet Take 1 Tab by mouth daily. 90 Tab 3    sildenafil citrate (VIAGRA) 100 mg tablet take 1 tablet 1 hour prior to intercourse 10 Tab 3    adalimumab (HUMIRA) 40 mg/0.8 mL injection by SubCUTAneous route once.  gabapentin (NEURONTIN) 600 mg tablet Take 1 Tab by mouth three (3) times daily. 90 Tab 2    hydrOXYzine HCl (ATARAX) 50 mg tablet Take 1 Tab by mouth every six (6) hours as needed for Anxiety. 180 Tab 1    fluticasone propionate (FLONASE) 50 mcg/actuation nasal spray 2 Sprays by Both Nostrils route daily.  1 Bottle 3    albuterol (PROVENTIL HFA, VENTOLIN HFA, PROAIR HFA) 90 mcg/actuation inhaler Take 2 Puffs by inhalation every four (4) hours as needed for Wheezing or Cough. 1 Inhaler 0    azelastine (ASTELIN) 137 mcg (0.1 %) nasal spray 1 [place] by Both Nostrils route two (2) times a day. Use in each nostril as directed 1 Bottle 0    pravastatin (PRAVACHOL) 40 mg tablet Take 1 Tab by mouth nightly. Indications: excessive fat in the blood 90 Tab 2    lisinopril (PRINIVIL, ZESTRIL) 20 mg tablet Take 1 Tab by mouth daily. 30 Tab 5    DULoxetine (CYMBALTA) 60 mg capsule Take 1 Cap by mouth daily. 30 Cap 5    hyoscyamine sulfate (CYSTOSPAZ) 0.125 mg tablet 1 Tab by SubLINGual route every six (6) hours as needed for Other (Spasms). 15 Tab 0    omeprazole (PRILOSEC OTC) 20 mg tablet Take 20 mg by mouth daily. Allergies   Allergen Reactions    Medrol [Methylprednisolone] Other (comments)     tachycardia  Patient states he had a severe allergic reaction about 15 years ago.  Lipitor [Atorvastatin] Other (comments)     Muscle cramping    Prednisone Other (comments)     tachycardia    Hydrocodone-Acetaminophen Itching       ROS:  Review of Systems   Constitutional: Positive for weight loss. Gastrointestinal: Positive for abdominal pain, constipation, heartburn, nausea and vomiting. All other systems reviewed and are negative. Physicial Exam:  Visit Vitals  /77 (BP 1 Location: Right arm, BP Patient Position: Sitting)   Pulse 74   Temp 96.2 °F (35.7 °C)   Ht 5' 9\" (1.753 m)   Wt 96.3 kg (212 lb 6.4 oz)   SpO2 96%   BMI 31.37 kg/m²     Physical Exam  HENT:      Head: Normocephalic. Cardiovascular:      Rate and Rhythm: Normal rate. Heart sounds: Normal heart sounds. Pulmonary:      Effort: Pulmonary effort is normal.      Breath sounds: Normal breath sounds. Abdominal:      General: Bowel sounds are normal. There is no distension. Palpations: Abdomen is soft. Tenderness: There is abdominal tenderness. Hernia: A hernia is present.       Comments: LUQ discomfort, + reducible hernia/mass   Musculoskeletal: Normal range of motion. Skin:     General: Skin is warm and dry. Neurological:      Mental Status: He is alert and oriented to person, place, and time. Psychiatric:         Mood and Affect: Mood and affect normal.         Labs:   Recent Results (from the past 672 hour(s))   [facility], [facility]. Collection Time: 02/20/20 10:23 AM   Result Value Ref Range    [facility]     METABOLIC PANEL, BASIC    Collection Time: 02/20/20 10:23 AM   Result Value Ref Range    Glucose 102 (H) 70 - 99 mg/dL    BUN 16 6 - 22 mg/dL    Creatinine 0.8 0.5 - 1.2 mg/dL    Sodium 139 133 - 145 mmol/L    Potassium 4.4 3.5 - 5.5 mmol/L    Chloride 102 98 - 110 mmol/L    CO2 24 20 - 32 mmol/L    Calcium 9.8 8.4 - 10.5 mg/dL    Anion gap 13.0 mmol/L    GFRAA >60.0 >60.0    GFRNA >60.0 >60.0   IRON    Collection Time: 02/20/20 10:23 AM   Result Value Ref Range    Iron 78 45 - 160 mcg/dL   VITAMIN B12 & FOLATE    Collection Time: 02/20/20 10:23 AM   Result Value Ref Range    Vitamin B12 >2000 (H) 211 - 911 pg/mL    Folate >20.00 >=3.10 ng/mL   VITAMIN D, 25 HYDROXY    Collection Time: 02/20/20 10:23 AM   Result Value Ref Range    VITAMIN D, 25-HYDROXY 74.8 32.0 - 100.0 ng/mL   FERRITIN    Collection Time: 02/20/20 10:23 AM   Result Value Ref Range    Ferritin 98 22 - 322 ng/mL   CBC WITH AUTOMATED DIFF    Collection Time: 02/20/20 10:23 AM   Result Value Ref Range    WBC 4.1 4.0 - 11.0 K/uL    RBC 5.12 3.80 - 5.80 M/uL    HGB 14.8 13.1 - 17.2 g/dL    HCT 45.2 39.3 - 51.6 %    MCV 88 80 - 95 fL    MCH 29 26 - 34 pg    MCHC 33 31 - 36 g/dL    RDW 13.3 10.0 - 15.5 %    PLATELET 546 521 - 889 K/uL    MPV 11.9 9.0 - 13.0 fL    NEUTROPHILS 63 40 - 75 %    Lymphocytes 22 20 - 45 %    MONOCYTES 8 3 - 12 %    EOSINOPHILS 5 0 - 6 %    BASOPHILS 2 0 - 2 %    ABS. NEUTROPHILS 2.6 1.8 - 7.7 K/uL    ABSOLUTE LYMPHOCYTE COUNT 0.9 (L) 1.0 - 4.8 K/uL    ABS. MONOCYTES 0.3 0.1 - 1.0 K/uL    ABS.  EOSINOPHILS 0.2 0.0 - 0.5 K/uL    ABS. BASOPHILS 0.1 0.0 - 0.2 K/uL   VITAMIN B1, WHOLE BLOOD    Collection Time: 02/20/20 10:23 AM   Result Value Ref Range    VITAMIN B1, WHOLE BLOOD 195.9 66.5 - 200.0 nmol/L   ALBUMIN    Collection Time: 02/20/20 10:23 AM   Result Value Ref Range    Albumin 4.5 3.5 - 5.0 g/dL     Assessment/Plan: TodayOtis is  Height: 5' 9\" (175.3 cm) tall, Weight: 96.3 kg (212 lb 6.4 oz) lbs for a Body mass index is 31.37 kg/m². and are suffering from obesity . They are currently 3 months s/p laparoscopic sleeve gastrectomy with a total weight loss of 58 lbs to date. Labs were reviewed and pt was instructed continue MVI/Ca/B12/D3/B1 supplementation. Stressed importance of hydration with SF clear liquids until urine clear. Continue with food content mainly protein veggies. Encouraged support group attendance. Advised exercise program of 150 mins per week. The primary encounter diagnosis was Post-resection malabsorption. Diagnoses of Gastroesophageal reflux disease without esophagitis, Nausea and vomiting, intractability of vomiting not specified, unspecified vomiting type, Dysphagia, unspecified type, and Generalized abdominal pain were also pertinent to this visit. UGI to rule out stricture. Patient would like hernia repaired but not at this time ready to schedule due to personal preferences. Follow up 3mo with labs, sooner as needed.   Janey Hines NP

## 2020-02-25 ENCOUNTER — HOSPITAL ENCOUNTER (OUTPATIENT)
Dept: GENERAL RADIOLOGY | Age: 55
Discharge: HOME | End: 2020-02-25
Attending: NURSE PRACTITIONER
Payer: MEDICAID

## 2020-02-25 DIAGNOSIS — R10.84 GENERALIZED ABDOMINAL PAIN: ICD-10-CM

## 2020-02-25 DIAGNOSIS — R11.2 NAUSEA AND VOMITING, INTRACTABILITY OF VOMITING NOT SPECIFIED, UNSPECIFIED VOMITING TYPE: ICD-10-CM

## 2020-02-25 DIAGNOSIS — K21.9 GASTROESOPHAGEAL REFLUX DISEASE WITHOUT ESOPHAGITIS: ICD-10-CM

## 2020-02-25 DIAGNOSIS — R13.10 DYSPHAGIA, UNSPECIFIED TYPE: ICD-10-CM

## 2020-02-25 DIAGNOSIS — K91.2 POST-RESECTION MALABSORPTION: ICD-10-CM

## 2020-02-25 PROCEDURE — 74011000255 HC RX REV CODE- 255: Performed by: NURSE PRACTITIONER

## 2020-02-25 PROCEDURE — 74240 X-RAY XM UPR GI TRC 1CNTRST: CPT

## 2020-02-25 RX ADMIN — BARIUM SULFATE 176 G: 960 POWDER, FOR SUSPENSION ORAL at 09:23

## 2020-03-05 DIAGNOSIS — R10.84 GENERALIZED ABDOMINAL PAIN: ICD-10-CM

## 2020-03-05 DIAGNOSIS — K21.9 GASTROESOPHAGEAL REFLUX DISEASE WITHOUT ESOPHAGITIS: ICD-10-CM

## 2020-03-05 DIAGNOSIS — K91.2 POST-RESECTION MALABSORPTION: ICD-10-CM

## 2020-03-05 DIAGNOSIS — R11.2 NAUSEA AND VOMITING, INTRACTABILITY OF VOMITING NOT SPECIFIED, UNSPECIFIED VOMITING TYPE: ICD-10-CM

## 2020-03-05 DIAGNOSIS — R13.10 DYSPHAGIA, UNSPECIFIED TYPE: ICD-10-CM

## 2020-03-07 DIAGNOSIS — M62.838 MUSCLE SPASM: ICD-10-CM

## 2020-03-15 RX ORDER — CYCLOBENZAPRINE HCL 10 MG
TABLET ORAL
Qty: 30 TAB | Refills: 0 | Status: SHIPPED | OUTPATIENT
Start: 2020-03-15 | End: 2020-04-21 | Stop reason: SDUPTHER

## 2020-04-26 DIAGNOSIS — F51.05 INSOMNIA SECONDARY TO DEPRESSION WITH ANXIETY: ICD-10-CM

## 2020-04-26 DIAGNOSIS — F41.8 INSOMNIA SECONDARY TO DEPRESSION WITH ANXIETY: ICD-10-CM

## 2020-04-27 RX ORDER — HYDROXYZINE 50 MG/1
50 TABLET, FILM COATED ORAL
Qty: 180 TAB | Refills: 1 | Status: SHIPPED | OUTPATIENT
Start: 2020-04-27 | End: 2022-04-13

## 2020-05-28 ENCOUNTER — VIRTUAL VISIT (OUTPATIENT)
Dept: SURGERY | Age: 55
End: 2020-05-28

## 2020-05-28 VITALS — HEIGHT: 69 IN | BODY MASS INDEX: 26.51 KG/M2 | WEIGHT: 179 LBS

## 2020-05-28 DIAGNOSIS — Z98.84 S/P GASTRIC BYPASS: ICD-10-CM

## 2020-05-28 DIAGNOSIS — R63.4 WEIGHT LOSS: ICD-10-CM

## 2020-05-28 DIAGNOSIS — K59.01 SLOW TRANSIT CONSTIPATION: ICD-10-CM

## 2020-05-28 DIAGNOSIS — K91.2 POST-RESECTION MALABSORPTION: Primary | ICD-10-CM

## 2020-05-28 DIAGNOSIS — K21.9 GASTROESOPHAGEAL REFLUX DISEASE WITHOUT ESOPHAGITIS: ICD-10-CM

## 2020-05-28 DIAGNOSIS — R11.10 VOMITING, INTRACTABILITY OF VOMITING NOT SPECIFIED, PRESENCE OF NAUSEA NOT SPECIFIED, UNSPECIFIED VOMITING TYPE: ICD-10-CM

## 2020-05-28 DIAGNOSIS — K91.2 POST-RESECTION MALABSORPTION: ICD-10-CM

## 2020-05-28 NOTE — PROGRESS NOTES
Annita Manning is a 50 y.o. male that presents today to follow up post  LGBP  preformed on 11/26/2019. Pt says pain level is at a 0 on a scale from 1-10. Pt is drinking 64oz of fluid daily. Pt is currently eating eggs,becan,chicken, low carb breads, broccoli . Pt says the amount of time spent exercising is walking and riding 1 time a week due to chronic back pn.

## 2020-05-28 NOTE — PATIENT INSTRUCTIONS
If you have any questions or concerns about today's appointment, the verbal and/or written instructions you were given for follow up care, please call our office at [phone]. [facility] - [address] 
 
[phone] office [phone]

## 2020-05-28 NOTE — PROGRESS NOTES
Consent:  Marlon Whitmore and/or his healthcare decision maker is aware that this patient-initiated Telehealth encounter is a billable service, with coverage as determined by his insurance carrier. He is aware that he may receive a bill and has provided verbal consent to proceed: Yes    Provider location while conducting visit: in the office  Patient location: Home  Other person participating in the telehealth services: TAIWO Reid   This virtual visit was conducted via interactive/real-time audio/video using [email or web address]   Visit start: 0915  Visit end: 0930        Bariatric Postoperative Progress Note    Ludin Casas is a 50 y.o. male is now 6 months status post laparoscopic gastric bypass surgery who was seen by synchronous (real-time) audio-video technology on 5/28/2020. Doing well overall. Currently on a stage 4 diet without difficulty. Taking in 100 oz water, 40-60g protein. Continues eating too quickly resulting vomiting. Riding bike several days a week. Bowel movements are constipated. He is compliant with multivitamins, calcium, Vit D, Vit B1 and B12 supplements. Planning for medial nerve block for back pain. Reached initial goal.  New Goal @ 160lbs.     Weight Loss Metrics 5/28/2020 5/28/2020 2/24/2020 2/24/2020 1/14/2020 1/7/2020 12/13/2019   Pre op / Initial Wt 289 - 271 - - - 289   Today's Wt - 179 lb - 212 lb 6.4 oz 229 lb 238 lb -   BMI - 26.43 kg/m2 - 31.37 kg/m2 33.82 kg/m2 35.15 kg/m2 -   Ideal Body Wt 154 - 154 - - - 151   Excess Body Wt 135 - 117 - - - 138   Goal Wt 184 - 178 - - - 179   Wt loss to date 110 - 58.6 - - - 40   % Wt Loss 1.02 - 0.63 - - - 0.36   80%  - 93.6 - - - 110.4     Comorbidities:  Hypertension: improved  Diabetes: improved  Obstructive Sleep Apnea: improved  Hyperlipidemia: improved  Stress Urinary Incontinence: not applicable  Gastroesophageal Reflux: improved  Weight related arthropathy:not applicable    Past Medical History:   Diagnosis Date    Adverse effect of anesthesia     brother- BP drops after surgery-difficult to wake    Back pain     Diabetes mellitus, type II (HCC)     Diverticulitis     Erectile dysfunction     GERD (gastroesophageal reflux disease)     Hypertension     Kidney stones     Morbid obesity ([address]     Nephrolithiasis     Osteoarthritis     Pneumonia     Psoriatic arthritis (HCC)     Psoriatic arthritis, 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 Vinod Chandler, rheum    Renal calculus, right     Snores     Swallowing difficulty     Ureteral calculi      Past Surgical History:   Procedure Laterality Date    HX CHOLECYSTECTOMY      2002    HX COLECTOMY  2017    HX COLONOSCOPY  02.22.2016    HX HERNIA REPAIR  41/19/5654    Umbilical    HX LAP GASTRIC BYPASS  11/26/2019    padmini-en-y    HX UROLOGICAL  06/06/2016    [facility]-Cystoscopy, CYSTOURETHROSCOPY W/ INDWELLING URETERAL STENT INSERTION, Dr Debra Lee HX UROLOGICAL  05/29/2016    [facility]- Cystoscopy, Right retrograde pyelography, Right 6 x 26 cm double-J ureteral stent placement,Intraoperative fluoro less than 1 hour,Interpretation of Urography, Dr Isaías Tony PARTIAL HIP REPLACEMENT Left      Current Outpatient Medications   Medication Sig Dispense Refill    hydrOXYzine HCL (ATARAX) 50 mg tablet Take 1 Tab by mouth every six (6) hours as needed for Anxiety. 180 Tab 1    cyclobenzaprine (FLEXERIL) 10 mg tablet TAKE 1 TABLET BY MOUTH 3 TIMES DAILY AS NEEDED FOR MUSCLE SPASM(S). 90 Tab 0    traZODone (DESYREL) 150 mg tablet Take 1 Tab by mouth nightly. 90 Tab 0    oxyCODONE-acetaminophen (PERCOCET) 7.5-325 mg per tablet Take  by mouth.  polyethylene glycol (MIRALAX) 17 gram packet Take 1 Packet by mouth daily. 30 Each 5    thiamine HCL (B-1) 100 mg tablet Take  by mouth daily.  cholecalciferol (VITAMIN D3) 25 mcg (1,000 unit) cap Take  by mouth daily. liquid      cyanocobalamin (VITAMIN B-12) 1,000 mcg tablet Take 1,000 mcg by mouth daily.       pediatric multivitamins chewable tablet Take 1 Tab by mouth daily.  fexofenadine (ALLEGRA) 180 mg tablet Take 1 Tab by mouth daily. 90 Tab 3    sildenafil citrate (VIAGRA) 100 mg tablet take 1 tablet 1 hour prior to intercourse 10 Tab 3    adalimumab (HUMIRA) 40 mg/0.8 mL injection by SubCUTAneous route once.  gabapentin (NEURONTIN) 600 mg tablet Take 1 Tab by mouth three (3) times daily. 90 Tab 2    fluticasone propionate (FLONASE) 50 mcg/actuation nasal spray 2 Sprays by Both Nostrils route daily. 1 Bottle 3     Allergies   Allergen Reactions    Medrol [Methylprednisolone] Other (comments)     tachycardia  Patient states he had a severe allergic reaction about 15 years ago.  Lipitor [Atorvastatin] Other (comments)     Muscle cramping    Prednisone Other (comments)     tachycardia    Hydrocodone-Acetaminophen Itching     ROS:  Review of Systems   Constitutional: Positive for weight loss. Gastrointestinal: Positive for vomiting. Negative for abdominal pain, diarrhea, heartburn and nausea. Musculoskeletal: Positive for back pain and joint pain. All other systems reviewed and are negative. Physicial Exam:  Visit Vitals  Ht 5' 9\" (1.753 m)   Wt 81.2 kg (179 lb)   BMI 26.43 kg/m²    (As obtained by patient/caregiver at home)  Physical Exam  Vitals signs and nursing note reviewed. Constitutional:       Appearance: He is obese. HENT:      Head: Normocephalic and atraumatic. Neck:      Musculoskeletal: Normal range of motion. Pulmonary:      Effort: Pulmonary effort is normal.   Neurological:      Mental Status: He is alert and oriented to person, place, and time. Labs: needs to be preformed/obtained     Assessment/Plan: Today, Yanet Jackson is  Height: 5' 9\" (175.3 cm) tall, Weight: 81.2 kg (179 lb) lbs for a Body mass index is 26.43 kg/m². and are suffering from overweight .   They are currently 6 months s/p laparoscopic gastric bypass surgery with a total weight loss of 110 lbs to date, doing well. Struggling with constipation but notes slight improvements when inc PO fluids. Labs were ordered  and pt was instructed to have preformed. Stressed importance of hydration with SF clear liquids until urine clear. Continue with food content mainly protein veggies. Encouraged support group attendance. Advised exercise program of 150 mins per week. We discussed the expected course, resolution and complications of the diagnosis(es) in detail. Medication risks, benefits, costs, interactions, and alternatives were discussed as indicated. I advised him to contact the office if his condition worsens, changes or fails to improve as anticipated. He expressed understanding with the diagnosis(es) and plan. The primary encounter diagnosis was Post-resection malabsorption. Diagnoses of Gastroesophageal reflux disease without esophagitis, BMI 26.0-26.9,adult, Weight loss, Vomiting, intractability of vomiting not specified, presence of nausea not specified, unspecified vomiting type, Slow transit constipation, and S/P gastric bypass were also pertinent to this visit. 6 mo with labs, sooner as needed. Services were provided through a video synchronous discussion virtually to substitute for in-person clinic visit. Pursuant to the emergency declaration under the [facility], 1135 waiver authority and the [employer] and [employer] Act, this Virtual  Visit was conducted, with patient's consent, to reduce the patient's risk of exposure to COVID-19 and provide continuity of care for an established patient.     Robin Beltre NP

## 2020-06-10 ENCOUNTER — VIRTUAL VISIT (OUTPATIENT)
Dept: FAMILY MEDICINE CLINIC | Age: 55
End: 2020-06-10

## 2020-10-13 ENCOUNTER — DOCUMENTATION ONLY (OUTPATIENT)
Dept: SURGERY | Age: 55
End: 2020-10-13

## 2020-10-13 NOTE — LETTER
[facility] 
LILIANE/Jeremy Gold [facility]. [address] [facility], [address] [place] [address] Loss [place] [facility] Dear Patient, Your health is our main concern. It is important for your health to have follow-up lab work and to see your surgeon at 3 months, 6 months and annually after your weight loss surgery. Additionally, the Department of Bariatric Surgery at our hospital is a member of the [employer] of [address] [place] [address] Surgical Quality Improvement Program ([facility] NSQIP). As a participant in this program, we gather information on the outcomes of our patients after surgery. Please call the office for a follow up appointment at [phone]. If you have moved out of the area or have changed surgeons please call us and let us know the name of your doctor. Your health and feedback are important to us. We greatly appreciate your response. Thank you, [facility] Loss [place] [id number] [facility]

## 2020-10-13 NOTE — PROGRESS NOTES
Per [facility] requirements;  E-mail and letter sent for follow up appointment. [employer] [facility]  Marilee Montano. [address] [facility] Weight Loss [place]   [employer] [facility]  [facility]        Dear Patient,        Your health is our main concern. It is important for your health to have follow-up lab work and to see your surgeon at 3 months, 6 months and annually after your weight loss surgery. Additionally, the Department of Bariatric Surgery at our hospital is a member of the [employer] of [address] [place] [address] Surgical Quality Improvement Program ([facility] NSQIP). As a participant in this program, we gather information on the outcomes of our patients after surgery. Please call the office for a follow up appointment at [phone]. If you have moved out of the area or have changed surgeons please call us and let us know the name of your doctor. Your health and feedback are important to us. We greatly appreciate your response.        Thank you,  [employer] [facility] Loss [address] [facility]

## 2021-01-19 ENCOUNTER — TELEPHONE (OUTPATIENT)
Dept: SURGERY | Age: 56
End: 2021-01-19

## 2021-01-19 DIAGNOSIS — K91.2 POST-RESECTION MALABSORPTION: Primary | ICD-10-CM

## 2021-01-19 DIAGNOSIS — K91.2 POST-RESECTION MALABSORPTION: ICD-10-CM

## 2021-01-20 ENCOUNTER — HOSPITAL ENCOUNTER (OUTPATIENT)
Dept: LAB | Age: 56
Discharge: HOME | End: 2021-01-20

## 2021-01-20 LAB — SENTARA SPECIMEN COL,SENBCF: NORMAL

## 2021-01-20 PROCEDURE — 99001 SPECIMEN HANDLING PT-LAB: CPT

## 2021-01-21 LAB
25(OH)D3 SERPL-MCNC: 82 NG/ML (ref 32–100)
ABSOLUTE LYMPHOCYTE COUNT, 10803: 1.4 K/UL (ref 1–4.8)
ALBUMIN SERPL-MCNC: 4.5 G/DL (ref 3.5–5)
ANION GAP SERPL CALC-SCNC: 12 MMOL/L (ref 3–15)
BASOPHILS # BLD: 0.1 K/UL (ref 0–0.2)
BASOPHILS NFR BLD: 2 % (ref 0–2)
BUN SERPL-MCNC: 16 MG/DL (ref 6–22)
CALCIUM SERPL-MCNC: 9.7 MG/DL (ref 8.4–10.5)
CHLORIDE SERPL-SCNC: 99 MMOL/L (ref 98–110)
CO2 SERPL-SCNC: 30 MMOL/L (ref 20–32)
CREAT SERPL-MCNC: 0.7 MG/DL (ref 0.5–1.2)
EOSINOPHIL # BLD: 0.3 K/UL (ref 0–0.5)
EOSINOPHIL NFR BLD: 7 % (ref 0–6)
ERYTHROCYTE [DISTWIDTH] IN BLOOD BY AUTOMATED COUNT: 11.8 % (ref 10–15.5)
FERRITIN SERPL-MCNC: 153 NG/ML (ref 22–322)
FOLATE,FOL: >20 NG/ML
GFRAA, 66117: >60
GFRNA, 66118: >60
GLUCOSE SERPL-MCNC: 89 MG/DL (ref 70–99)
GRANULOCYTES,GRANS: 52 % (ref 40–75)
HCT VFR BLD AUTO: 43.9 % (ref 39.3–51.6)
HGB BLD-MCNC: 14.6 G/DL (ref 13.1–17.2)
IRON,IRN: 106 MCG/DL (ref 45–160)
LYMPHOCYTES, LYMLT: 32 % (ref 20–45)
MCH RBC QN AUTO: 31 PG (ref 26–34)
MCHC RBC AUTO-ENTMCNC: 33 G/DL (ref 31–36)
MCV RBC AUTO: 92 FL (ref 80–95)
MONOCYTES # BLD: 0.3 K/UL (ref 0.1–1)
MONOCYTES NFR BLD: 8 % (ref 3–12)
NEUTROPHILS # BLD AUTO: 2.2 K/UL (ref 1.8–7.7)
PLATELET # BLD AUTO: 251 K/UL (ref 140–440)
PMV BLD AUTO: 10.4 FL (ref 9–13)
POTASSIUM SERPL-SCNC: 4.3 MMOL/L (ref 3.5–5.5)
RBC # BLD AUTO: 4.79 M/UL (ref 3.8–5.8)
SODIUM SERPL-SCNC: 141 MMOL/L (ref 133–145)
VIT B12 SERPL-MCNC: >2000 PG/ML (ref 211–911)
WBC # BLD AUTO: 4.3 K/UL (ref 4–11)

## 2021-01-22 ENCOUNTER — OFFICE VISIT (OUTPATIENT)
Dept: SURGERY | Age: 56
End: 2021-01-22
Payer: MEDICAID

## 2021-01-22 VITALS
TEMPERATURE: 98.1 F | BODY MASS INDEX: 27.4 KG/M2 | HEART RATE: 79 BPM | DIASTOLIC BLOOD PRESSURE: 74 MMHG | SYSTOLIC BLOOD PRESSURE: 129 MMHG | HEIGHT: 69 IN | WEIGHT: 185 LBS | RESPIRATION RATE: 20 BRPM

## 2021-01-22 DIAGNOSIS — K91.2 POST-RESECTION MALABSORPTION: Primary | ICD-10-CM

## 2021-01-22 PROCEDURE — 99213 OFFICE O/P EST LOW 20 MIN: CPT | Performed by: SURGERY

## 2021-01-22 RX ORDER — NALOXEGOL OXALATE 25 MG/1
TABLET, FILM COATED ORAL
COMMUNITY
Start: 2020-12-14

## 2021-01-22 RX ORDER — CALCIUM CARBONATE/VITAMIN D3 600 MG-125
500 TABLET ORAL 3 TIMES DAILY
COMMUNITY

## 2021-01-22 RX ORDER — CERTOLIZUMAB PEGOL 200 MG/ML
INJECTION, SOLUTION SUBCUTANEOUS
COMMUNITY
Start: 2021-01-12 | End: 2021-03-31

## 2021-01-22 NOTE — PROGRESS NOTES
Subjective:      Annabel Ibarra is a 50 y.o. male is now 1 years status post laparoscopic gastric bypass surgery. Doing well overall. Currently on a stage 4 diet without difficulty. The patient is compliant with multivitamins, calcium and B12 supplements. He is having new hip pain. Weight Loss Metrics 5/28/2020 5/28/2020 2/24/2020 2/24/2020 1/14/2020 1/7/2020 12/13/2019   Pre op / Initial Wt 289 - 271 - - - 289   Today's Wt - 179 lb - 212 lb 6.4 oz 229 lb 238 lb -   BMI - 26.43 kg/m2 - 31.37 kg/m2 33.82 kg/m2 35.15 kg/m2 -   Ideal Body Wt 154 - 154 - - - 151   Excess Body Wt 135 - 117 - - - 138   Goal Wt 184 - 178 - - - 179   Wt loss to date 110 - 58.6 - - - 40   % Wt Loss 1.02 - 0.63 - - - 0.36   80%  - 93.6 - - - 110.4       There is no height or weight on file to calculate BMI.     Comorbidities:    Hypertension: resolved  Diabetes: resolved  Obstructive Sleep Apnea: not applicable  Hyperlipidemia: not applicable  Stress Urinary Incontinence: not applicable  Gastroesophageal Reflux: resolved  Weight related arthropathy:resolved        Past Medical History:   Diagnosis Date    Adverse effect of anesthesia     brother- BP drops after surgery-difficult to wake    Back pain     Diabetes mellitus, type II ([address]     Diverticulitis     Erectile dysfunction     GERD (gastroesophageal reflux disease)     Hypertension     Kidney stones     Morbid obesity ([address]     Nephrolithiasis     Osteoarthritis     Pneumonia     Psoriatic arthritis ([address]     Psoriatic arthritis, 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 Vinod Chandler, rheum    Renal calculus, right     Snores     Swallowing difficulty     Ureteral calculi        Past Surgical History:   Procedure Laterality Date    HX CHOLECYSTECTOMY      2002    HX COLECTOMY  2017    HX COLONOSCOPY  02.22.2016    HX HERNIA REPAIR  01/62/5371    Umbilical    HX LAP GASTRIC BYPASS  11/26/2019    padmini-en-y    HX UROLOGICAL  06/06/2016    [facility]-Cystoscopy, CYSTOURETHROSCOPY W/ INDWELLING URETERAL STENT INSERTION, Dr Yarely Silva  UROLOGICAL  05/29/2016    [facility]- Cystoscopy, Right retrograde pyelography, Right 6 x 26 cm double-J ureteral stent placement,Intraoperative fluoro less than 1 hour,Interpretation of Urography, Dr Trent Francois PARTIAL HIP REPLACEMENT Left        Current Outpatient Medications   Medication Sig Dispense Refill    sildenafil citrate (VIAGRA) 100 mg tablet take 1 tab by mouth one hour prior to intercourse 30 Tab 0    hydrOXYzine HCL (ATARAX) 50 mg tablet Take 1 Tab by mouth every six (6) hours as needed for Anxiety. 180 Tab 1    cyclobenzaprine (FLEXERIL) 10 mg tablet TAKE 1 TABLET BY MOUTH 3 TIMES DAILY AS NEEDED FOR MUSCLE SPASM(S). 90 Tab 0    traZODone (DESYREL) 150 mg tablet Take 1 Tab by mouth nightly. 90 Tab 0    oxyCODONE-acetaminophen (PERCOCET) 7.5-325 mg per tablet Take  by mouth.  polyethylene glycol (MIRALAX) 17 gram packet Take 1 Packet by mouth daily. 30 Each 5    thiamine HCL (B-1) 100 mg tablet Take  by mouth daily.  cholecalciferol (VITAMIN D3) 25 mcg (1,000 unit) cap Take  by mouth daily. liquid      cyanocobalamin (VITAMIN B-12) 1,000 mcg tablet Take 1,000 mcg by mouth daily.  pediatric multivitamins chewable tablet Take 1 Tab by mouth daily.  fexofenadine (ALLEGRA) 180 mg tablet Take 1 Tab by mouth daily. 90 Tab 3    adalimumab (HUMIRA) 40 mg/0.8 mL injection by SubCUTAneous route once.  gabapentin (NEURONTIN) 600 mg tablet Take 1 Tab by mouth three (3) times daily. 90 Tab 2    fluticasone propionate (FLONASE) 50 mcg/actuation nasal spray 2 Sprays by Both Nostrils route daily. 1 Bottle 3       Allergies   Allergen Reactions    Medrol [Methylprednisolone] Other (comments)     tachycardia  Patient states he had a severe allergic reaction about 15 years ago.      Lipitor [Atorvastatin] Other (comments)     Muscle cramping    Prednisone Other (comments)     tachycardia    Hydrocodone-Acetaminophen Itching         Objective: There were no vitals taken for this visit. General:  alert, cooperative, no distress, appears stated age   Chest: no accessory muscle use   Cor:   Regular rate and rhythm   Abdomen: soft, bowel sounds active, non-tender   Incision:   healed       Labs: all normal    Assessment:     Doing well postoperatively.     Plan:     Follow up labs as ordered, Follow up with Registered Dietician and Continue MVI/Ca/B12 supplementation  Follow up in 1 years    See psych for eating compulsions

## 2021-01-30 LAB — VITAMIN B1, WHOLE BLOOD, 66250: 222.4 NMOL/L (ref 66.5–200)

## 2021-03-31 PROBLEM — Z87.442 HISTORY OF RENAL CALCULI: Status: ACTIVE | Noted: 2021-03-31

## 2021-03-31 PROBLEM — F11.90 CHRONIC NARCOTIC USE: Status: ACTIVE | Noted: 2021-03-31

## 2021-03-31 PROBLEM — K57.32 DIVERTICULITIS OF COLON: Status: ACTIVE | Noted: 2021-03-31

## 2021-03-31 PROBLEM — I10 ESSENTIAL HYPERTENSION: Status: RESOLVED | Noted: 2019-05-20 | Resolved: 2021-03-31

## 2021-03-31 PROBLEM — K62.5 RECTAL BLEED: Status: ACTIVE | Noted: 2021-03-31

## 2021-03-31 PROBLEM — G47.9 SLEEP DISTURBANCE: Status: ACTIVE | Noted: 2021-03-31

## 2021-03-31 PROBLEM — R10.32 ABDOMINAL PAIN, LLQ: Status: ACTIVE | Noted: 2021-03-31

## 2021-03-31 PROBLEM — Z12.11 SPECIAL SCREENING FOR MALIGNANT NEOPLASMS, COLON: Status: ACTIVE | Noted: 2021-03-31

## 2021-03-31 PROBLEM — K92.1 MELENA: Status: ACTIVE | Noted: 2021-03-31

## 2021-03-31 PROBLEM — K59.03 DRUG-INDUCED CONSTIPATION: Status: ACTIVE | Noted: 2021-03-31

## 2021-03-31 PROBLEM — Z98.84 GASTRIC BYPASS STATUS FOR OBESITY: Status: ACTIVE | Noted: 2021-03-31

## 2021-03-31 PROBLEM — T14.8XXA INJURY DUE TO FOREIGN BODY: Status: ACTIVE | Noted: 2021-03-31

## 2021-10-05 ENCOUNTER — DOCUMENTATION ONLY (OUTPATIENT)
Dept: SURGERY | Age: 56
End: 2021-10-05

## 2021-10-05 NOTE — PROGRESS NOTES
Per [facility] requirements;  E-mail and letter sent for follow up appointment. [facility] Loss [place] [address]      Dear Patient,    Your health is our main concern. It is important for your health to have follow-up lab work and to see your surgeon at 3 months, 6 months and annually after your weight loss surgery. Additionally, the Department of bariatric Surgery at our hospital is a member of the Metabolic and Bariatric Surgery Accreditation and Quality Improvement Program [facility]). As a participant in this program, we gather information on the outcomes of our patients after surgery. Please call the office for a follow up appointment at [phone] [facility]) or [phone] [facility]). If you have moved out of the area or have changed surgeons please call us and let us know the name of your doctor. Your health and feedback are important to us. We greatly appreciate your response.        Thank you,  [facility] Loss [address] [facility]

## 2022-03-18 PROBLEM — R10.32 ABDOMINAL PAIN, LLQ: Status: ACTIVE | Noted: 2021-03-31

## 2022-03-18 PROBLEM — G47.9 SLEEP DISTURBANCE: Status: ACTIVE | Noted: 2021-03-31

## 2022-03-18 PROBLEM — K59.03 DRUG-INDUCED CONSTIPATION: Status: ACTIVE | Noted: 2021-03-31

## 2022-03-18 PROBLEM — G89.29 CHRONIC BILATERAL LOW BACK PAIN WITH BILATERAL SCIATICA: Status: ACTIVE | Noted: 2019-05-20

## 2022-03-18 PROBLEM — Z98.84 GASTRIC BYPASS STATUS FOR OBESITY: Status: ACTIVE | Noted: 2021-03-31

## 2022-03-18 PROBLEM — M54.41 CHRONIC BILATERAL LOW BACK PAIN WITH BILATERAL SCIATICA: Status: ACTIVE | Noted: 2019-05-20

## 2022-03-18 PROBLEM — L40.50 PSORIATIC ARTHRITIS (HCC): Status: ACTIVE | Noted: 2019-06-10

## 2022-03-18 PROBLEM — M54.42 CHRONIC BILATERAL LOW BACK PAIN WITH BILATERAL SCIATICA: Status: ACTIVE | Noted: 2019-05-20

## 2022-03-18 PROBLEM — T14.8XXA INJURY DUE TO FOREIGN BODY: Status: ACTIVE | Noted: 2021-03-31

## 2022-03-19 PROBLEM — F41.8 INSOMNIA SECONDARY TO DEPRESSION WITH ANXIETY: Status: ACTIVE | Noted: 2019-06-24

## 2022-03-19 PROBLEM — Z87.442 HISTORY OF RENAL CALCULI: Status: ACTIVE | Noted: 2021-03-31

## 2022-03-19 PROBLEM — E11.40 TYPE 2 DIABETES MELLITUS WITH DIABETIC NEUROPATHY (HCC): Status: ACTIVE | Noted: 2019-06-10

## 2022-03-19 PROBLEM — M51.36 DDD (DEGENERATIVE DISC DISEASE), LUMBAR: Status: ACTIVE | Noted: 2019-05-20

## 2022-03-19 PROBLEM — K92.1 MELENA: Status: ACTIVE | Noted: 2021-03-31

## 2022-03-19 PROBLEM — K57.92 DIVERTICULITIS: Status: ACTIVE | Noted: 2017-09-06

## 2022-03-19 PROBLEM — M51.369 DDD (DEGENERATIVE DISC DISEASE), LUMBAR: Status: ACTIVE | Noted: 2019-05-20

## 2022-03-19 PROBLEM — F51.05 INSOMNIA SECONDARY TO DEPRESSION WITH ANXIETY: Status: ACTIVE | Noted: 2019-06-24

## 2022-03-19 PROBLEM — F11.90 CHRONIC NARCOTIC USE: Status: ACTIVE | Noted: 2021-03-31

## 2022-03-20 PROBLEM — K62.5 RECTAL BLEED: Status: ACTIVE | Noted: 2021-03-31

## 2022-03-20 PROBLEM — Z12.11 SPECIAL SCREENING FOR MALIGNANT NEOPLASMS, COLON: Status: ACTIVE | Noted: 2021-03-31

## 2022-03-20 PROBLEM — K57.32 DIVERTICULITIS OF COLON: Status: ACTIVE | Noted: 2021-03-31

## 2022-10-04 ENCOUNTER — DOCUMENTATION ONLY (OUTPATIENT)
Dept: SURGERY | Age: 57
End: 2022-10-04

## 2022-10-04 NOTE — PROGRESS NOTES
Per [facility] requirements;  E-mail and letter sent for follow up appointment. Alice Samayoago Loss [place] [address]      Dear Patient,    Your health is our main concern. It is important for your health to have follow-up lab work and to see your surgeon at 3 months, 6 months and annually after your weight loss surgery. Additionally, the Department of bariatric Surgery at our hospital is a member of the Metabolic and Bariatric Surgery Accreditation and Quality Improvement Program [facility]). As a participant in this program, we gather information on the outcomes of our patients after surgery. Please call the office for a follow up appointment at [phone] [facility]) or [phone] [facility]). If you have moved out of the area or have changed surgeons please call us and let us know the name of your doctor. Your health and feedback are important to us. We greatly appreciate your response.        Thank you,  [place] Loss [address] [facility]

## 2023-03-16 NOTE — MR AVS SNAPSHOT
Visit Information Date & Time Provider Department Dept. Phone Encounter #  
 10/18/2017  4:00 PM Hebert MercedesNeela 13 [id number] Follow-up Instructions Return in about 2 days (around 10/20/2017). Your Appointments 5/8/2018  9:00 AM  
ULTRASOUND with [facility] ULTRASOUND [facility] ([address]) Appt Note: Return in about 1 year (around 5/9/2018) for renal US prior. [address] [place] [id number]  
[phone]  
  
   
 [place] [id number] Upcoming Health Maintenance Date Due  
 EYE EXAM RETINAL OR DILATED Q1 1/23/1977 DTaP/Tdap/Td series (1 - Tdap) 5/12/2012 LIPID PANEL Q1 12/17/2016 HEMOGLOBIN A1C Q6M 1/21/2017 FOBT Q 1 YEAR AGE 50-75 1/23/2017 MICROALBUMIN Q1 2/8/2017 INFLUENZA AGE 9 TO ADULT 8/1/2017 FOOT EXAM Q1 10/28/2017 Allergies as of 10/18/2017  Review Complete On: 10/18/2017 By: Hebert Mercedes MD  
  
 Severity Noted Reaction Type Reactions Medrol [Methylprednisolone] High 08/07/2012    Anaphylaxis, Other (comments)  
 tachycardia Other reaction(s): other/intolerance 
tachycardia Patient states he had a severe allergic reaction about 15 years ago. Hydrocodone-acetaminophen Low 08/07/2012    Rash Current Immunizations  Reviewed on 2/17/2017 Name Date Influenza Vaccine 12/13/2015 Influenza Vaccine (Quad) PF 11/11/2016 Pneumococcal Polysaccharide (PPSV-23) 1/1/2016 Td 5/11/2012 Not reviewed this visit You Were Diagnosed With   
  
 Codes Comments Uncontrolled hypertension    -  Primary ICD-10-CM: I10 
ICD-9-CM: 401.9 Vitals BP Pulse Temp Resp Height(growth percentile) Weight(growth percentile) (!) 136/92 (!) 108 97.4 °F (36.3 °C) (Oral) 16 5' 8\" (1.727 m) 300 lb (136.1 kg) SpO2 BMI Smoking Status 96% 45.61 kg/m2 Never Smoker Vitals History BMI and BSA Data Body Mass Index Body Surface Area  
 45.61 kg/m 2 2.56 m 2 Preferred Pharmacy Pharmacy Name Phone [facility], [address] [place] [address] [phone] Your Updated Medication List  
  
   
This list is accurate as of: 10/18/17  5:00 PM.  Always use your most recent med list.  
  
  
  
  
 aspirin 81 mg chewable tablet Take 1 Tab by mouth daily. celecoxib 200 mg capsule Commonly known as:  CELEBREX Take 1 Cap by mouth daily as needed. cyclobenzaprine 10 mg tablet Commonly known as:  FLEXERIL Take 1 Tab by mouth three (3) times daily as needed for Muscle Spasm(s). Indications: MUSCLE SPASM  
  
 gabapentin 300 mg capsule Commonly known as:  NEURONTIN Take 300 mg by mouth three (3) times daily. ibuprofen 800 mg tablet Commonly known as:  MOTRIN Take  by mouth.  
  
 lisinopril 10 mg tablet Commonly known as:  Anne Marie Laxmi Take 1 Tab by mouth daily. LORazepam 0.5 mg tablet Commonly known as:  ATIVAN Take 1 Tab by mouth every eight (8) hours as needed for Anxiety. Max Daily Amount: 1.5 mg. Indications: ANXIETY  
  
 meloxicam 7.5 mg tablet Commonly known as:  MOBIC Take 1 Tab by mouth nightly as needed. oxyCODONE-acetaminophen 5-325 mg per tablet Commonly known as:  PERCOCET Take 1 Tab by mouth every eight (8) hours as needed for Pain. Max Daily Amount: 3 Tabs.  
  
 sildenafil citrate 100 mg tablet Commonly known as:  VIAGRA  
take 1 tablet 1 hour prior to intercourse VOLTAREN 1 % Gel Generic drug:  diclofenac Apply  to affected area every six (6) hours. Follow-up Instructions Return in about 2 days (around 10/20/2017). Patient Instructions DASH Diet: Care Instructions Your Care Instructions The DASH diet is an eating plan that can help lower your blood pressure. DASH stands for Dietary Approaches to Stop Hypertension.  Hypertension is high blood pressure. The DASH diet focuses on eating foods that are high in calcium, potassium, and magnesium. These nutrients can lower blood pressure. The foods that are highest in these nutrients are fruits, vegetables, low-fat dairy products, nuts, seeds, and legumes. But taking calcium, potassium, and magnesium supplements instead of eating foods that are high in those nutrients does not have the same effect. The DASH diet also includes whole grains, fish, and poultry. The DASH diet is one of several lifestyle changes your doctor may recommend to lower your high blood pressure. Your doctor may also want you to decrease the amount of sodium in your diet. Lowering sodium while following the DASH diet can lower blood pressure even further than just the DASH diet alone. Follow-up care is a key part of your treatment and safety. Be sure to make and go to all appointments, and call your doctor if you are having problems. It's also a good idea to know your test results and keep a list of the medicines you take. How can you care for yourself at home? Following the DASH diet · Eat 4 to 5 servings of fruit each day. A serving is 1 medium-sized piece of fruit, ½ cup chopped or canned fruit, 1/4 cup dried fruit, or 4 ounces (½ cup) of fruit juice. Choose fruit more often than fruit juice. · Eat 4 to 5 servings of vegetables each day. A serving is 1 cup of lettuce or raw leafy vegetables, ½ cup of chopped or cooked vegetables, or 4 ounces (½ cup) of vegetable juice. Choose vegetables more often than vegetable juice. · Get 2 to 3 servings of low-fat and fat-free dairy each day. A serving is 8 ounces of milk, 1 cup of yogurt, or 1 ½ ounces of cheese. · Eat 6 to 8 servings of grains each day. A serving is 1 slice of bread, 1 ounce of dry cereal, or ½ cup of cooked rice, pasta, or cooked cereal. Try to choose whole-grain products as much as possible. · Limit lean meat, poultry, and fish to 2 servings each day. A serving is 3 ounces, about the size of a deck of cards. · Eat 4 to 5 servings of nuts, seeds, and legumes (cooked dried beans, lentils, and split peas) each week. A serving is 1/3 cup of nuts, 2 tablespoons of seeds, or ½ cup of cooked beans or peas. · Limit fats and oils to 2 to 3 servings each day. A serving is 1 teaspoon of vegetable oil or 2 tablespoons of salad dressing. · Limit sweets and added sugars to 5 servings or less a week. A serving is 1 tablespoon jelly or jam, ½ cup sorbet, or 1 cup of lemonade. · Eat less than 2,300 milligrams (mg) of sodium a day. If you limit your sodium to 1,500 mg a day, you can lower your blood pressure even more. Tips for success · Start small. Do not try to make dramatic changes to your diet all at once. You might feel that you are missing out on your favorite foods and then be more likely to not follow the plan. Make small changes, and stick with them. Once those changes become habit, add a few more changes. · Try some of the following: ¨ Make it a goal to eat a fruit or vegetable at every meal and at snacks. This will make it easy to get the recommended amount of fruits and vegetables each day. ¨ Try yogurt topped with fruit and nuts for a snack or healthy dessert. ¨ Add lettuce, tomato, cucumber, and onion to sandwiches. ¨ Combine a ready-made pizza crust with low-fat mozzarella cheese and lots of vegetable toppings. Try using tomatoes, squash, spinach, broccoli, carrots, cauliflower, and onions. ¨ Have a variety of cut-up vegetables with a low-fat dip as an appetizer instead of chips and dip. ¨ Sprinkle sunflower seeds or chopped almonds over salads. Or try adding chopped walnuts or almonds to cooked vegetables. ¨ Try some vegetarian meals using beans and peas. Add garbanzo or kidney beans to salads. Make burritos and tacos with mashed pride beans or black beans. Where can you learn more? Go to http://andreia-liliane.info/. Enter N013 in the search box to learn more about \"DASH Diet: Care Instructions. \" Current as of: April 3, 2017 Content Version: 11.3 © 6192-1206 [employer]. Care instructions adapted under license by [employer] (which disclaims liability or warranty for this information). If you have questions about a medical condition or this instruction, always ask your healthcare professional. [address] any warranty or liability for your use of this information. Introducing [facility]! Dear Vale Mistry: Thank you for requesting a [employer] account. Our records indicate that you already have an active [employer] account. You can access your account anytime at [email or web address] Did you know that you can access your hospital and ER discharge instructions at any time in [employer] You can also review all of your test results from your hospital stay or ER visit. Additional Information If you have questions, please visit the Frequently Asked Questions section of the [employer] website at [email or web address] Remember, [employer] is NOT to be used for urgent needs. For medical emergencies, dial 911. Now available from your iPhone and Android! Please provide this summary of care documentation to your next provider. Your primary care clinician is listed as Colin Donahue. If you have any questions after today's visit, please call [phone]. Complex Repair And Bilobe Flap Text: The defect edges were debeveled with a #15 scalpel blade.  The primary defect was closed partially with a complex linear closure.  Given the location of the remaining defect, shape of the defect and the proximity to free margins a bilobe flap was deemed most appropriate for complete closure of the defect.  Using a sterile surgical marker, an appropriate advancement flap was drawn incorporating the defect and placing the expected incisions within the relaxed skin tension lines where possible.    The area thus outlined was incised deep to adipose tissue with a #15 scalpel blade.  The skin margins were undermined to an appropriate distance in all directions utilizing iris scissors.

## 2023-06-13 PROBLEM — M79.652 PAIN OF LEFT THIGH: Status: ACTIVE | Noted: 2023-06-13

## 2023-06-13 PROBLEM — G43.701 CHRONIC MIGRAINE WITHOUT AURA WITH STATUS MIGRAINOSUS, NOT INTRACTABLE: Status: ACTIVE | Noted: 2023-06-13

## 2023-06-13 PROBLEM — N20.0 NEPHROLITHIASIS: Status: ACTIVE | Noted: 2023-06-13

## 2023-06-14 PROBLEM — Z91.89 FRAMINGHAM CARDIAC RISK 10-20% IN NEXT 10 YEARS: Status: ACTIVE | Noted: 2023-06-14

## 2023-06-14 PROBLEM — D72.810 LYMPHOPENIA: Status: ACTIVE | Noted: 2023-06-14

## 2023-06-14 PROBLEM — R31.29 MICROSCOPIC HEMATURIA: Status: ACTIVE | Noted: 2023-06-14

## 2023-06-14 PROBLEM — E11.29 MICROALBUMINURIA DUE TO TYPE 2 DIABETES MELLITUS (HCC): Status: ACTIVE | Noted: 2023-06-14

## 2023-06-14 PROBLEM — R79.89 HIGH SERUM VITAMIN B12: Status: ACTIVE | Noted: 2023-06-14

## 2023-06-14 PROBLEM — R80.9 MICROALBUMINURIA DUE TO TYPE 2 DIABETES MELLITUS (HCC): Status: ACTIVE | Noted: 2023-06-14

## 2023-06-27 ENCOUNTER — OFFICE VISIT (OUTPATIENT)
Facility: CLINIC | Age: 58
End: 2023-06-27
Payer: COMMERCIAL

## 2023-06-27 VITALS
OXYGEN SATURATION: 97 % | WEIGHT: 192 LBS | SYSTOLIC BLOOD PRESSURE: 122 MMHG | RESPIRATION RATE: 16 BRPM | BODY MASS INDEX: 30.13 KG/M2 | HEIGHT: 67 IN | TEMPERATURE: 97.9 F | HEART RATE: 75 BPM | DIASTOLIC BLOOD PRESSURE: 88 MMHG

## 2023-06-27 DIAGNOSIS — L98.9 SKIN LESION: ICD-10-CM

## 2023-06-27 DIAGNOSIS — R79.89 HIGH SERUM VITAMIN B12: ICD-10-CM

## 2023-06-27 DIAGNOSIS — R80.9 MICROALBUMINURIA DUE TO TYPE 2 DIABETES MELLITUS (HCC): ICD-10-CM

## 2023-06-27 DIAGNOSIS — M25.552 PAIN OF LEFT HIP: ICD-10-CM

## 2023-06-27 DIAGNOSIS — D72.810 LYMPHOPENIA: ICD-10-CM

## 2023-06-27 DIAGNOSIS — G43.701 CHRONIC MIGRAINE WITHOUT AURA WITH STATUS MIGRAINOSUS, NOT INTRACTABLE: ICD-10-CM

## 2023-06-27 DIAGNOSIS — E11.29 MICROALBUMINURIA DUE TO TYPE 2 DIABETES MELLITUS (HCC): ICD-10-CM

## 2023-06-27 DIAGNOSIS — E78.2 MIXED HYPERLIPIDEMIA: ICD-10-CM

## 2023-06-27 DIAGNOSIS — E11.40 TYPE 2 DIABETES MELLITUS WITH DIABETIC NEUROPATHY, UNSPECIFIED WHETHER LONG TERM INSULIN USE (HCC): Primary | ICD-10-CM

## 2023-06-27 PROCEDURE — 3044F HG A1C LEVEL LT 7.0%: CPT | Performed by: FAMILY MEDICINE

## 2023-06-27 PROCEDURE — 99214 OFFICE O/P EST MOD 30 MIN: CPT | Performed by: FAMILY MEDICINE

## 2023-06-27 RX ORDER — EZETIMIBE 10 MG/1
10 TABLET ORAL DAILY
Qty: 90 TABLET | Refills: 2 | Status: SHIPPED | OUTPATIENT
Start: 2023-06-27

## 2023-06-27 SDOH — ECONOMIC STABILITY: FOOD INSECURITY: WITHIN THE PAST 12 MONTHS, THE FOOD YOU BOUGHT JUST DIDN'T LAST AND YOU DIDN'T HAVE MONEY TO GET MORE.: NEVER TRUE

## 2023-06-27 SDOH — ECONOMIC STABILITY: FOOD INSECURITY: WITHIN THE PAST 12 MONTHS, YOU WORRIED THAT YOUR FOOD WOULD RUN OUT BEFORE YOU GOT MONEY TO BUY MORE.: NEVER TRUE

## 2023-06-27 SDOH — ECONOMIC STABILITY: INCOME INSECURITY: HOW HARD IS IT FOR YOU TO PAY FOR THE VERY BASICS LIKE FOOD, HOUSING, MEDICAL CARE, AND HEATING?: NOT HARD AT ALL

## 2023-06-27 SDOH — ECONOMIC STABILITY: HOUSING INSECURITY
IN THE LAST 12 MONTHS, WAS THERE A TIME WHEN YOU DID NOT HAVE A STEADY PLACE TO SLEEP OR SLEPT IN A SHELTER (INCLUDING NOW)?: NO

## 2023-06-27 ASSESSMENT — PATIENT HEALTH QUESTIONNAIRE - PHQ9
SUM OF ALL RESPONSES TO PHQ QUESTIONS 1-9: 3
SUM OF ALL RESPONSES TO PHQ9 QUESTIONS 1 & 2: 1
5. POOR APPETITE OR OVEREATING: 0
2. FEELING DOWN, DEPRESSED OR HOPELESS: 1
SUM OF ALL RESPONSES TO PHQ QUESTIONS 1-9: 3
6. FEELING BAD ABOUT YOURSELF - OR THAT YOU ARE A FAILURE OR HAVE LET YOURSELF OR YOUR FAMILY DOWN: 0
9. THOUGHTS THAT YOU WOULD BE BETTER OFF DEAD, OR OF HURTING YOURSELF: 0
SUM OF ALL RESPONSES TO PHQ QUESTIONS 1-9: 3
3. TROUBLE FALLING OR STAYING ASLEEP: 1
8. MOVING OR SPEAKING SO SLOWLY THAT OTHER PEOPLE COULD HAVE NOTICED. OR THE OPPOSITE, BEING SO FIGETY OR RESTLESS THAT YOU HAVE BEEN MOVING AROUND A LOT MORE THAN USUAL: 0
SUM OF ALL RESPONSES TO PHQ QUESTIONS 1-9: 3
7. TROUBLE CONCENTRATING ON THINGS, SUCH AS READING THE NEWSPAPER OR WATCHING TELEVISION: 0
4. FEELING TIRED OR HAVING LITTLE ENERGY: 1
1. LITTLE INTEREST OR PLEASURE IN DOING THINGS: 0
10. IF YOU CHECKED OFF ANY PROBLEMS, HOW DIFFICULT HAVE THESE PROBLEMS MADE IT FOR YOU TO DO YOUR WORK, TAKE CARE OF THINGS AT HOME, OR GET ALONG WITH OTHER PEOPLE: 0

## 2023-06-27 ASSESSMENT — ENCOUNTER SYMPTOMS
ABDOMINAL PAIN: 0
CONSTIPATION: 0
VOMITING: 0
NAUSEA: 0
DIARRHEA: 0
BACK PAIN: 1
COUGH: 0
SORE THROAT: 0

## 2023-07-03 ENCOUNTER — PATIENT MESSAGE (OUTPATIENT)
Facility: CLINIC | Age: 58
End: 2023-07-03

## 2023-07-03 DIAGNOSIS — G43.809 OTHER MIGRAINE WITHOUT STATUS MIGRAINOSUS, NOT INTRACTABLE: Primary | ICD-10-CM

## 2023-07-03 RX ORDER — SUMATRIPTAN 50 MG/1
TABLET, FILM COATED ORAL
Qty: 9 TABLET | Refills: 0 | Status: SHIPPED | OUTPATIENT
Start: 2023-07-03

## 2023-07-03 NOTE — TELEPHONE ENCOUNTER
Good afternoon! I sent it to your pharmacy for you but it's only for 9 pills; please schedule an appointment for either a virtual or in person appointment in 1 month for refills.     V/r,  Dr. Kelvin Mac

## 2023-07-31 ENCOUNTER — CLINICAL DOCUMENTATION (OUTPATIENT)
Facility: CLINIC | Age: 58
End: 2023-07-31

## 2023-08-03 ENCOUNTER — OFFICE VISIT (OUTPATIENT)
Facility: CLINIC | Age: 58
End: 2023-08-03
Payer: COMMERCIAL

## 2023-08-03 VITALS
SYSTOLIC BLOOD PRESSURE: 112 MMHG | HEART RATE: 64 BPM | TEMPERATURE: 97.9 F | OXYGEN SATURATION: 99 % | HEIGHT: 67 IN | DIASTOLIC BLOOD PRESSURE: 76 MMHG | BODY MASS INDEX: 30.29 KG/M2 | RESPIRATION RATE: 16 BRPM | WEIGHT: 193 LBS

## 2023-08-03 DIAGNOSIS — E78.2 MIXED HYPERLIPIDEMIA: ICD-10-CM

## 2023-08-03 DIAGNOSIS — E11.40 TYPE 2 DIABETES MELLITUS WITH DIABETIC NEUROPATHY, UNSPECIFIED WHETHER LONG TERM INSULIN USE (HCC): ICD-10-CM

## 2023-08-03 DIAGNOSIS — R80.9 MICROALBUMINURIA DUE TO TYPE 2 DIABETES MELLITUS (HCC): ICD-10-CM

## 2023-08-03 DIAGNOSIS — E11.29 MICROALBUMINURIA DUE TO TYPE 2 DIABETES MELLITUS (HCC): ICD-10-CM

## 2023-08-03 DIAGNOSIS — G43.809 OTHER MIGRAINE WITHOUT STATUS MIGRAINOSUS, NOT INTRACTABLE: Primary | ICD-10-CM

## 2023-08-03 PROBLEM — G43.909 MIGRAINE: Status: ACTIVE | Noted: 2023-08-03

## 2023-08-03 PROCEDURE — 99214 OFFICE O/P EST MOD 30 MIN: CPT | Performed by: FAMILY MEDICINE

## 2023-08-03 PROCEDURE — 3044F HG A1C LEVEL LT 7.0%: CPT | Performed by: FAMILY MEDICINE

## 2023-08-03 RX ORDER — TOPIRAMATE 25 MG/1
TABLET ORAL
COMMUNITY
Start: 2023-07-08

## 2023-08-03 RX ORDER — SUMATRIPTAN 50 MG/1
TABLET, FILM COATED ORAL
Qty: 9 TABLET | Refills: 5 | Status: SHIPPED | OUTPATIENT
Start: 2023-08-03

## 2023-08-03 ASSESSMENT — ENCOUNTER SYMPTOMS
DIARRHEA: 0
ABDOMINAL PAIN: 0
COUGH: 0
NAUSEA: 0
CONSTIPATION: 0
VOMITING: 0
SORE THROAT: 0

## 2023-08-03 NOTE — PROGRESS NOTES
Lois Cruz presents today for   Chief Complaint   Patient presents with    Skin Problem     Inside of left thigh       Is someone accompanying this pt? No    Is the patient using any DME equipment during OV? No    Depression Screening:  PHQ-9  6/27/2023   Little interest or pleasure in doing things 0   Feeling down, depressed, or hopeless 1   Trouble falling or staying asleep, or sleeping too much 1   Feeling tired or having little energy 1   Poor appetite or overeating 0   Feeling bad about yourself - or that you are a failure or have let yourself or your family down 0   Trouble concentrating on things, such as reading the newspaper or watching television 0   Moving or speaking so slowly that other people could have noticed. Or the opposite - being so fidgety or restless that you have been moving around a lot more than usual 0   Thoughts that you would be better off dead, or of hurting yourself in some way 0   PHQ-2 Score 1   PHQ-9 Total Score 3   If you checked off any problems, how difficult have these problems made it for you to do your work, take care of things at home, or get along with other people? 0               Health Maintenance reviewed and discussed and ordered per Provider. Health Maintenance Due   Topic Date Due    HIV screen  Never done    Diabetic retinal exam  Never done    Hepatitis C screen  Never done    Hepatitis B vaccine (1 of 3 - Risk 3-dose series) Never done    DTaP/Tdap/Td vaccine (1 - Tdap) 05/12/2012    Shingles vaccine (1 of 2) Never done    Diabetic foot exam  05/08/2020    COVID-19 Vaccine (4 - Booster for Pfizer series) 03/11/2022    Flu vaccine (1) 08/01/2023   . 1. \"Have you been to the ER, urgent care clinic since your last visit? Hospitalized since your last visit? \" No    2. \"Have you seen or consulted any other health care providers outside of the [address] [place] [address] since your last visit? \" No    3. For patients over 45: Has the patient had a colonoscopy?

## 2023-08-03 NOTE — PROGRESS NOTES
[address] [facility], [address]               [phone]        Alex Ordoñez is a 66 y.o. male and presents with Skin Problem (Inside of left thigh)           Assessment/Plan:  Tee Adams was seen today for skin problem. Diagnoses and all orders for this visit:    Subcutaneous cyst    Type 2 diabetes mellitus with diabetic neuropathy, unspecified whether long term insulin use ([address])  -     Hemoglobin A1C; Future  -     Lipid Panel; Future  -     Microalbumin / Creatinine Urine Ratio; Future    Mixed hyperlipidemia  -     Lipid Panel; Future    Microalbuminuria due to type 2 diabetes mellitus (HCC)  -     Microalbumin / Creatinine Urine Ratio; Future    Other migraine without status migrainosus, not intractable  -     SUMAtriptan (IMITREX) 50 MG tablet; Take one tab po at onset of migraine; may repeat dose in 2 hours if still ineffective; max 2 tabs/24 hours, 9 tabs/month      Migraines: uncontrolled; refill provided on imitrex; advised to restart topamax for prevention; f/u at next appt    DM2: controlled; continue current meds to assist with sugar/microalbuminuria    HLD: uncontrolled; stressed importance of taking zetia to assist with cholesterol given he was intolerant with statin; offered Repatha injections but he declined and wants to see if he can be more compliant on zetia with repeat labs; schedule labs after 9/14      Follow up and disposition:   No follow-ups on file.       Health Maintenance:   Health Maintenance   Topic Date Due    HIV screen  Never done    Diabetic retinal exam  Never done    Hepatitis C screen  Never done    Hepatitis B vaccine (1 of 3 - Risk 3-dose series) Never done    DTaP/Tdap/Td vaccine (1 - Tdap) 05/12/2012    Shingles vaccine (1 of 2) Never done    Diabetic foot exam  05/08/2020    COVID-19 Vaccine (4 - Booster for Pfizer series) 03/11/2022    Flu vaccine (1) 08/01/2023    A1C test (Diabetic or Prediabetic)  06/13/2024    Diabetic Alb to Cr

## 2023-08-04 ENCOUNTER — PATIENT MESSAGE (OUTPATIENT)
Facility: CLINIC | Age: 58
End: 2023-08-04

## 2023-08-04 DIAGNOSIS — Z78.9 STATIN INTOLERANCE: ICD-10-CM

## 2023-08-04 DIAGNOSIS — E11.40 TYPE 2 DIABETES MELLITUS WITH DIABETIC NEUROPATHY, UNSPECIFIED WHETHER LONG TERM INSULIN USE (HCC): Primary | ICD-10-CM

## 2023-08-05 NOTE — TELEPHONE ENCOUNTER
Great! I will try to send it in to your pharmacy; insurance may or may not approve it but we'll submit what we need to do next week if a prior authorization is needed.   V/r,  Dr. Ronaldo Nava

## 2023-08-05 NOTE — TELEPHONE ENCOUNTER
From: Azael Perera  To: Dr. Shama Correa: 8/4/2023 9:30 PM EDT  Subject: Repatha    Good evening ,    If possible, I would like to try taking Repatha for High cholesterol. If you could send it in.  I can let you know how it went when I come back in     Thank you   [facility]

## 2023-08-30 DIAGNOSIS — E11.29 MICROALBUMINURIA DUE TO TYPE 2 DIABETES MELLITUS (HCC): ICD-10-CM

## 2023-08-30 DIAGNOSIS — R80.9 MICROALBUMINURIA DUE TO TYPE 2 DIABETES MELLITUS (HCC): ICD-10-CM

## 2023-08-30 DIAGNOSIS — E11.40 TYPE 2 DIABETES MELLITUS WITH DIABETIC NEUROPATHY, UNSPECIFIED WHETHER LONG TERM INSULIN USE (HCC): ICD-10-CM

## 2023-08-30 NOTE — TELEPHONE ENCOUNTER
Humera Contreras! Please contact your pharmacy first for refills then if they give you any problems, reach out to us. I sent in a 6 month supply back in June for you to [facility] so you should have at least 1 three month refill available for .   V/r,  Dr. Kailyn Chandler

## 2023-09-13 ENCOUNTER — PATIENT MESSAGE (OUTPATIENT)
Facility: CLINIC | Age: 58
End: 2023-09-13

## 2023-09-14 NOTE — TELEPHONE ENCOUNTER
You  Madyson Garrido \"Kirk\" Just now (12:10 PM)       Good morning! Yes, looks like you lost some. Was ordered 6/27 for 90 day supply--should have had enough to last until 9/27. Please call the pharmacy to see if they can give you an early refill.      V/r,  Dr. Charissa Benavides          Summary: Take 1 tablet by mouth every morning, Disp-90 tablet, R-1  Normal   Dose, Route, Frequency: 10 mg, Oral, EVERY MORNING  Start: 6/27/2023  Ord/Sold: 6/27/2023 (O)  Report  [facility] [address] [phone]  Med Dose History  Change          Patient Sig: Take 1 tablet by mouth every morning        Ordered on: 6/27/2023        Authorized by: Meme Rodrigez        Dispense: 90 tablet        Refills: 1 ordered

## 2023-09-14 NOTE — TELEPHONE ENCOUNTER
From: Kelly Ulrich  To: Dr. Alejo Bacon: 9/13/2023 9:18 PM EDT  Subject: Jaspreet Rice evening Dr Liriano Dry,  Can you please check the refill date for me farxiga?  Michael Zapata been out for a while not sure if I lost some

## 2023-09-21 ENCOUNTER — TELEPHONE (OUTPATIENT)
Facility: CLINIC | Age: 58
End: 2023-09-21

## 2023-09-21 DIAGNOSIS — R82.90 ABNORMAL URINALYSIS: Primary | ICD-10-CM

## 2023-09-21 DIAGNOSIS — R82.90 ABNORMAL URINALYSIS: ICD-10-CM

## 2023-09-21 PROBLEM — R79.89 HIGH SERUM VITAMIN B12: Status: RESOLVED | Noted: 2023-06-14 | Resolved: 2023-09-21

## 2023-09-21 NOTE — TELEPHONE ENCOUNTER
Reviewed urine results--findings for possible UTI; urine culture can be added to sample after discussion with [facility] reference lab today; requested us to fax order to [phone] and they said they could use the sample they have.

## 2023-09-23 LAB — RESULT: ABNORMAL

## 2023-09-27 ENCOUNTER — OFFICE VISIT (OUTPATIENT)
Facility: CLINIC | Age: 58
End: 2023-09-27
Payer: COMMERCIAL

## 2023-09-27 VITALS
BODY MASS INDEX: 30.29 KG/M2 | RESPIRATION RATE: 16 BRPM | HEIGHT: 67 IN | TEMPERATURE: 98.2 F | SYSTOLIC BLOOD PRESSURE: 106 MMHG | DIASTOLIC BLOOD PRESSURE: 69 MMHG | HEART RATE: 91 BPM | WEIGHT: 193 LBS | OXYGEN SATURATION: 96 %

## 2023-09-27 DIAGNOSIS — K59.00 CONSTIPATION, UNSPECIFIED CONSTIPATION TYPE: ICD-10-CM

## 2023-09-27 DIAGNOSIS — R53.82 CHRONIC FATIGUE: ICD-10-CM

## 2023-09-27 DIAGNOSIS — N30.01 ACUTE CYSTITIS WITH HEMATURIA: Primary | ICD-10-CM

## 2023-09-27 DIAGNOSIS — E11.29 MICROALBUMINURIA DUE TO TYPE 2 DIABETES MELLITUS (HCC): ICD-10-CM

## 2023-09-27 DIAGNOSIS — E78.2 MIXED HYPERLIPIDEMIA: ICD-10-CM

## 2023-09-27 DIAGNOSIS — E11.40 TYPE 2 DIABETES MELLITUS WITH DIABETIC NEUROPATHY, UNSPECIFIED WHETHER LONG TERM INSULIN USE (HCC): ICD-10-CM

## 2023-09-27 DIAGNOSIS — R80.9 MICROALBUMINURIA DUE TO TYPE 2 DIABETES MELLITUS (HCC): ICD-10-CM

## 2023-09-27 DIAGNOSIS — R09.89 CHEST CONGESTION: ICD-10-CM

## 2023-09-27 DIAGNOSIS — J01.40 ACUTE NON-RECURRENT PANSINUSITIS: ICD-10-CM

## 2023-09-27 PROCEDURE — 3044F HG A1C LEVEL LT 7.0%: CPT | Performed by: FAMILY MEDICINE

## 2023-09-27 PROCEDURE — 99214 OFFICE O/P EST MOD 30 MIN: CPT | Performed by: FAMILY MEDICINE

## 2023-09-27 RX ORDER — GUAIFENESIN 600 MG/1
600 TABLET, EXTENDED RELEASE ORAL 2 TIMES DAILY
Qty: 30 TABLET | Refills: 0 | Status: SHIPPED | OUTPATIENT
Start: 2023-09-27 | End: 2023-10-12

## 2023-09-27 RX ORDER — SOD CHLOR,BICARB/SQUEEZ BOTTLE
PACKET, WITH RINSE DEVICE NASAL
Qty: 1 EACH | Refills: 0 | Status: SHIPPED | OUTPATIENT
Start: 2023-09-27

## 2023-09-27 RX ORDER — NITROFURANTOIN 25; 75 MG/1; MG/1
100 CAPSULE ORAL 2 TIMES DAILY
Qty: 14 CAPSULE | Refills: 0 | Status: SHIPPED | OUTPATIENT
Start: 2023-09-27 | End: 2023-10-04

## 2023-09-27 SDOH — ECONOMIC STABILITY: INCOME INSECURITY: HOW HARD IS IT FOR YOU TO PAY FOR THE VERY BASICS LIKE FOOD, HOUSING, MEDICAL CARE, AND HEATING?: NOT HARD AT ALL

## 2023-09-27 SDOH — ECONOMIC STABILITY: FOOD INSECURITY: WITHIN THE PAST 12 MONTHS, THE FOOD YOU BOUGHT JUST DIDN'T LAST AND YOU DIDN'T HAVE MONEY TO GET MORE.: NEVER TRUE

## 2023-09-27 SDOH — ECONOMIC STABILITY: FOOD INSECURITY: WITHIN THE PAST 12 MONTHS, YOU WORRIED THAT YOUR FOOD WOULD RUN OUT BEFORE YOU GOT MONEY TO BUY MORE.: NEVER TRUE

## 2023-09-27 ASSESSMENT — ENCOUNTER SYMPTOMS
DIARRHEA: 0
CONSTIPATION: 1
SORE THROAT: 0
ABDOMINAL PAIN: 0
COUGH: 0
NAUSEA: 0
VOMITING: 0

## 2023-09-27 ASSESSMENT — PATIENT HEALTH QUESTIONNAIRE - PHQ9
2. FEELING DOWN, DEPRESSED OR HOPELESS: 0
SUM OF ALL RESPONSES TO PHQ9 QUESTIONS 1 & 2: 0
SUM OF ALL RESPONSES TO PHQ QUESTIONS 1-9: 0
SUM OF ALL RESPONSES TO PHQ QUESTIONS 1-9: 0
1. LITTLE INTEREST OR PLEASURE IN DOING THINGS: 0
SUM OF ALL RESPONSES TO PHQ QUESTIONS 1-9: 0
SUM OF ALL RESPONSES TO PHQ QUESTIONS 1-9: 0

## 2023-09-27 NOTE — PATIENT INSTRUCTIONS
----- Message from [facility], [address] [phone] Upper respiratory infections/sinusitis/colds  Take plain Mucinex (blue/white box) every 12 hours with enough water so that your urine remains clear. The Mucinex will help for the cough/congestion. Use cepacol for sore throat]  Take Vitamin C or Elderberry over the counter to boost your immune system. Gargle with salt water or lemon/honey for laryngitis (losing your voice). For sinus infection, use a nasal rinse (NeilMed Sinus rinse) at least once/day thirty minutes after the Mucinex. Do not use tap water. Use distilled or sterile water available at the pharmacy. This will help loosen/wash out the discharge in your facial sinuses and decrease headache/pain. You can use this up to three times/day. Use a mist vaporizer/humidifier at night. These infections are typically Viruses and do NOT need antibiotics. Viruses may last WEEKS but should be getting better each day. Continue this treatment for at least 7 days but return to clinic if you are not getting better after 1 week or sooner if symptoms are worsening.

## 2023-09-27 NOTE — PROGRESS NOTES
[address] [place] [address], 2 [facility]               [phone]        Kailash Hawkins is a 66 y.o. male and presents with Follow-up           Assessment/Plan:  Ernie Sparrow was seen today for follow-up. Diagnoses and all orders for this visit:    Acute cystitis with hematuria  -     nitrofurantoin, macrocrystal-monohydrate, (MACROBID) 100 MG capsule; Take 1 capsule by mouth 2 times daily for 7 days  -     Urinalysis with Microscopic; Future    Type 2 diabetes mellitus with diabetic neuropathy, unspecified whether long term insulin use (HCC)    Microalbuminuria due to type 2 diabetes mellitus ([address])    Mixed hyperlipidemia    Chest congestion  -     guaiFENesin (MUCINEX) 600 MG extended release tablet; Take 1 tablet by mouth 2 times daily for 15 days    Chronic fatigue  -     Testosterone, Free; Future    Acute non-recurrent pansinusitis  -     Hypertonic Nasal Wash (SINUS RINSE BOTTLE KIT) PACK; Wash sinus 1-3x daily prn sinus congestion or pressure    Constipation, unspecified constipation type      Begin macrobid for treatment; enterococcus sensitive to this; asymptomatic and given farxiga can increase UTI risks, will repeat UA in 3 months for monitoring    DM2: still controlled; microalbuminuria improving;     HLD: LDL at goal; continue current dose of statin    URI/congestion/pansinusitis: begin NeilMed sinus rinse/mucinex and Vitamin C supplement; f/u in 1 week if no improvement    Constipation: encouraged miralax tid until large BM then use every 1-2 days to maintain regularity    Fatigue: chronic per pt; normal B12/thyroid/cbc/Vitamin D recently; check testosterone    Follow up and disposition:   Return in about 1 week (around 10/4/2023), or if symptoms worsen or fail to improve, for labs.   Schedule lab between 8-9 am within days  Urinanalysis in December  15 min follow up after 1 week of urine lab    Health Maintenance:   Health Maintenance   Topic Date Due    Hepatitis B vaccine (1

## 2023-10-05 ENCOUNTER — TELEPHONE (OUTPATIENT)
Facility: CLINIC | Age: 58
End: 2023-10-05

## 2023-10-05 DIAGNOSIS — R53.83 OTHER FATIGUE: Primary | ICD-10-CM

## 2023-10-05 NOTE — TELEPHONE ENCOUNTER
Received fax from [facility] on 10/2 that they received specimen to perform testing but was sent in incorrect tube--states needs 1 red tube for testosterone test and they got 1 gold tube; \"please collect the patient's blood and send specimen with a new requisition\"    Please apologize to patient for inconvenience and discuss with Falguni/Deena for need for another lab appointment

## 2023-11-30 ENCOUNTER — OFFICE VISIT (OUTPATIENT)
Facility: CLINIC | Age: 58
End: 2023-11-30
Payer: COMMERCIAL

## 2023-11-30 VITALS
HEART RATE: 66 BPM | DIASTOLIC BLOOD PRESSURE: 67 MMHG | RESPIRATION RATE: 16 BRPM | WEIGHT: 192 LBS | TEMPERATURE: 98 F | OXYGEN SATURATION: 98 % | BODY MASS INDEX: 30.13 KG/M2 | SYSTOLIC BLOOD PRESSURE: 116 MMHG | HEIGHT: 67 IN

## 2023-11-30 DIAGNOSIS — K21.9 GASTROESOPHAGEAL REFLUX DISEASE, UNSPECIFIED WHETHER ESOPHAGITIS PRESENT: ICD-10-CM

## 2023-11-30 DIAGNOSIS — J01.40 ACUTE NON-RECURRENT PANSINUSITIS: ICD-10-CM

## 2023-11-30 DIAGNOSIS — S05.8X1D BLUNT TRAUMA OF RIGHT EYE, SUBSEQUENT ENCOUNTER: ICD-10-CM

## 2023-11-30 DIAGNOSIS — B34.9 VIRAL ILLNESS: Primary | ICD-10-CM

## 2023-11-30 LAB
QUICKVUE INFLUENZA TEST: NEGATIVE
VALID INTERNAL CONTROL, POC: YES

## 2023-11-30 PROCEDURE — 87804 INFLUENZA ASSAY W/OPTIC: CPT | Performed by: FAMILY MEDICINE

## 2023-11-30 PROCEDURE — 99214 OFFICE O/P EST MOD 30 MIN: CPT | Performed by: FAMILY MEDICINE

## 2023-11-30 RX ORDER — OMEPRAZOLE 40 MG/1
CAPSULE, DELAYED RELEASE ORAL
Qty: 90 CAPSULE | Refills: 2 | Status: SHIPPED | OUTPATIENT
Start: 2023-11-30

## 2023-11-30 RX ORDER — GUAIFENESIN 600 MG/1
1200 TABLET, EXTENDED RELEASE ORAL 2 TIMES DAILY
Qty: 40 TABLET | Refills: 0 | Status: SHIPPED | OUTPATIENT
Start: 2023-11-30 | End: 2023-12-10

## 2023-11-30 RX ORDER — DOXYCYCLINE HYCLATE 100 MG
100 TABLET ORAL 2 TIMES DAILY
Qty: 20 TABLET | Refills: 0 | Status: SHIPPED | OUTPATIENT
Start: 2023-11-30 | End: 2023-12-10

## 2023-11-30 ASSESSMENT — ENCOUNTER SYMPTOMS
DIARRHEA: 0
VOMITING: 0
SORE THROAT: 1
BACK PAIN: 1
CONSTIPATION: 0
COUGH: 1
ABDOMINAL PAIN: 0
NAUSEA: 0

## 2023-11-30 NOTE — PATIENT INSTRUCTIONS
NeilMed Sinus rinse    ezetimibe (ZETIA) 10 MG tablet 90 tablet 2 6/27/2023     Sig - Route:  Take 1 tablet by mouth daily - Oral    Sent to pharmacy as: Ezetimibe 10 MG Oral Tablet (Stephani Andrade)    E-Prescribing Status: Receipt confirmed by pharmacy (6/27/2023 10:05 AM EDT)

## 2023-11-30 NOTE — PROGRESS NOTES
General: He is not in acute distress. Appearance: Normal appearance. He is normal weight. He is not ill-appearing, toxic-appearing or diaphoretic. HENT:      Head: Normocephalic. Comments: Frontal/maxillary sinus tenderness b/l     Right Ear: External ear normal.      Left Ear: External ear normal.      Nose: Nose normal.   Eyes:      Extraocular Movements: Extraocular movements intact. Conjunctiva/sclera: Conjunctivae normal.   Cardiovascular:      Rate and Rhythm: Normal rate and regular rhythm. Heart sounds: Normal heart sounds. No murmur heard. No friction rub. No gallop. Pulmonary:      Effort: Pulmonary effort is normal. No respiratory distress. Breath sounds: Normal breath sounds. No stridor. No wheezing, rhonchi or rales. Musculoskeletal:      Right lower leg: No edema. Left lower leg: No edema. Skin:     Comments: R periorbital bruising to lateral edge/eyebrow with linear wound--no surrounding erythema; no drainage   Neurological:      General: No focal deficit present. Mental Status: He is alert and oriented to person, place, and time. I have discussed the diagnosis with the patient and the intended plan as seen in the above orders. The patient has received an After-Visit Summary and questions were answered concerning future plans. An After Visit Summary was printed and given to the patient. All diagnoses have been discussed with the patient and all of the patient's questions have been answered.            Sahara Mejias MD  [address] [place] [address] Dr Group   [address]

## 2023-12-26 ENCOUNTER — TELEPHONE (OUTPATIENT)
Facility: CLINIC | Age: 58
End: 2023-12-26

## 2023-12-26 DIAGNOSIS — E11.40 TYPE 2 DIABETES MELLITUS WITH DIABETIC NEUROPATHY, UNSPECIFIED WHETHER LONG TERM INSULIN USE (HCC): Primary | ICD-10-CM

## 2023-12-26 NOTE — TELEPHONE ENCOUNTER
Please cancel pending appt 12/27; pt still has not completed testosterone test    Please advise pt that he can schedule testosterone test if he still wants to complete this--must be done between 8-9 am    Also, please schedule him for follow up appointment in March with labs 1 week prior to appointment

## 2023-12-27 DIAGNOSIS — R80.9 MICROALBUMINURIA DUE TO TYPE 2 DIABETES MELLITUS (HCC): ICD-10-CM

## 2023-12-27 DIAGNOSIS — E11.29 MICROALBUMINURIA DUE TO TYPE 2 DIABETES MELLITUS (HCC): ICD-10-CM

## 2023-12-27 DIAGNOSIS — E11.40 TYPE 2 DIABETES MELLITUS WITH DIABETIC NEUROPATHY, UNSPECIFIED WHETHER LONG TERM INSULIN USE (HCC): ICD-10-CM

## 2023-12-27 DIAGNOSIS — G43.809 OTHER MIGRAINE WITHOUT STATUS MIGRAINOSUS, NOT INTRACTABLE: ICD-10-CM

## 2023-12-27 RX ORDER — EZETIMIBE 10 MG/1
10 TABLET ORAL DAILY
Qty: 90 TABLET | Refills: 2 | Status: SHIPPED | OUTPATIENT
Start: 2023-12-27

## 2023-12-27 RX ORDER — SUMATRIPTAN 50 MG/1
TABLET, FILM COATED ORAL
Qty: 9 TABLET | Refills: 5 | Status: SHIPPED | OUTPATIENT
Start: 2023-12-27

## 2024-01-10 ENCOUNTER — NURSE ONLY (OUTPATIENT)
Facility: CLINIC | Age: 59
End: 2024-01-10

## 2024-01-10 DIAGNOSIS — N30.01 ACUTE CYSTITIS WITH HEMATURIA: ICD-10-CM

## 2024-01-10 DIAGNOSIS — R53.83 OTHER FATIGUE: ICD-10-CM

## 2024-01-14 LAB
BACTERIA: PRESENT
BILIRUB SERPL-MCNC: NEGATIVE MG/DL
BLOOD: ABNORMAL
CLARITY, UA: CLEAR
COLOR, UA: YELLOW
EPITHELIAL CELLS: ABNORMAL /HPF
GLUCOSE: ABNORMAL MG/DL
HYALINE CASTS: ABNORMAL /LPF (ref 0–2)
KETONES, URINE: NEGATIVE MG/DL
LEUKOCYTE ESTERASE, URINE: NEGATIVE
NITRITE, URINE: NEGATIVE
PH, URINE: 5.5 PH (ref 5–8)
PROTEIN UA: ABNORMAL MG/DL
RBC URINE: ABNORMAL /HPF
SPECIFIC GRAVITY: 1.02 (ref 1–1.03)
SPERM: PRESENT /HPF
TESTOSTERONE, FREE DIRECT: 51.7 PG/ML (ref 35–155)
TESTOSTERONE: 364 NG/DL (ref 250–1100)
UROBILINOGEN: 0.2 MG/DL
WBC UA: ABNORMAL /HPF (ref 0–2)
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
## 2024-03-21 ENCOUNTER — TELEPHONE (OUTPATIENT)
Facility: CLINIC | Age: 59
End: 2024-03-21

## 2024-03-21 NOTE — TELEPHONE ENCOUNTER
Patient called requesting an acute appointment. His complaints were fever, head congestion with green mucous. Instructed patient you are out of the office until Monday 3-25-24 and advised aptient to seek evaluation at an urgent care. Patient verbalized understanding.

## 2024-03-27 ENCOUNTER — TELEPHONE (OUTPATIENT)
Facility: CLINIC | Age: 59
End: 2024-03-27

## 2024-03-27 NOTE — TELEPHONE ENCOUNTER
Please reschedule appointmetn  Pt has not completed labs yet that were pre-ordered    Please schedule labs and reschedule follow up appointment to 1 week after labs

## 2024-04-03 LAB
A/G RATIO: 1.5 RATIO (ref 1.1–2.6)
ALBUMIN SERPL-MCNC: 4.3 G/DL (ref 3.5–5)
ALP BLD-CCNC: 86 U/L (ref 25–115)
ALT SERPL-CCNC: 15 U/L (ref 5–40)
ANION GAP SERPL CALCULATED.3IONS-SCNC: 11 MMOL/L (ref 3–15)
AST SERPL-CCNC: 20 U/L (ref 10–37)
BASOPHILS # BLD: 1 % (ref 0–2)
BASOPHILS ABSOLUTE: 0.1 K/UL (ref 0–0.2)
BILIRUB SERPL-MCNC: 0.3 MG/DL (ref 0.2–1.2)
BUN BLDV-MCNC: 10 MG/DL (ref 6–22)
CALCIUM SERPL-MCNC: 9.4 MG/DL (ref 8.4–10.5)
CHLORIDE BLD-SCNC: 100 MMOL/L (ref 98–110)
CHOLESTEROL/HDL RATIO: 3.2 (ref 0–5)
CHOLESTEROL: 159 MG/DL (ref 110–200)
CO2: 27 MMOL/L (ref 20–32)
CREAT SERPL-MCNC: 0.7 MG/DL (ref 0.5–1.2)
CREATININE URINE: 107 MG/DL
EOSINOPHIL # BLD: 3 % (ref 0–6)
EOSINOPHILS ABSOLUTE: 0.2 K/UL (ref 0–0.5)
ESTIMATED AVERAGE GLUCOSE: 111 MG/DL (ref 91–123)
GLOBULIN: 2.9 G/DL (ref 2–4)
GLOMERULAR FILTRATION RATE: >60 ML/MIN/1.73 SQ.M.
GLUCOSE: 92 MG/DL (ref 70–99)
HBA1C MFR BLD: 5.5 % (ref 4.8–5.6)
HCT VFR BLD CALC: 49.4 % (ref 39.3–51.6)
HDLC SERPL-MCNC: 49 MG/DL
HEMOGLOBIN: 15.5 G/DL (ref 13.1–17.2)
LDL CHOLESTEROL CALCULATED: 76 MG/DL (ref 50–99)
LDL/HDL RATIO: 1.6
LYMPHOCYTES # BLD: 17 % (ref 20–45)
LYMPHOCYTES ABSOLUTE: 1.2 K/UL (ref 1–4.8)
MCH RBC QN AUTO: 30 PG (ref 26–34)
MCHC RBC AUTO-ENTMCNC: 31 G/DL (ref 31–36)
MCV RBC AUTO: 96 FL (ref 80–95)
MICROALB/CREAT RATIO (UG/MG CREAT.): 139.3 (ref 0–30)
MICROALBUMIN/CREAT 24H UR: 149.1 MG/L (ref 0.1–17)
MONOCYTES ABSOLUTE: 0.4 K/UL (ref 0.1–1)
MONOCYTES: 6 % (ref 3–12)
NEUTROPHILS ABSOLUTE: 5.3 K/UL (ref 1.8–7.7)
NEUTROPHILS: 72 % (ref 40–75)
NON-HDL CHOLESTEROL: 110 MG/DL
PDW BLD-RTO: 12.7 % (ref 10–15.5)
PLATELET # BLD: 232 K/UL (ref 140–440)
PMV BLD AUTO: 10.3 FL (ref 9–13)
POTASSIUM SERPL-SCNC: 4.4 MMOL/L (ref 3.5–5.5)
RBC: 5.16 M/UL (ref 3.8–5.8)
SODIUM BLD-SCNC: 138 MMOL/L (ref 133–145)
TOTAL PROTEIN: 7.2 G/DL (ref 6.4–8.3)
TRIGL SERPL-MCNC: 169 MG/DL (ref 40–149)
TSH SERPL DL<=0.05 MIU/L-ACNC: 1.4 MCU/ML (ref 0.27–4.2)
VLDLC SERPL CALC-MCNC: 34 MG/DL (ref 8–30)
WBC: 7.3 K/UL (ref 4–11)

## 2024-04-08 ENCOUNTER — OFFICE VISIT (OUTPATIENT)
Facility: CLINIC | Age: 59
End: 2024-04-08
Payer: MEDICAID

## 2024-04-08 VITALS
DIASTOLIC BLOOD PRESSURE: 75 MMHG | HEIGHT: 67 IN | WEIGHT: 189.6 LBS | TEMPERATURE: 98 F | HEART RATE: 63 BPM | RESPIRATION RATE: 16 BRPM | OXYGEN SATURATION: 96 % | BODY MASS INDEX: 29.76 KG/M2 | SYSTOLIC BLOOD PRESSURE: 115 MMHG

## 2024-04-08 DIAGNOSIS — J30.2 SEASONAL ALLERGIC RHINITIS, UNSPECIFIED TRIGGER: ICD-10-CM

## 2024-04-08 DIAGNOSIS — R31.0 GROSS HEMATURIA: ICD-10-CM

## 2024-04-08 DIAGNOSIS — R80.9 MICROALBUMINURIA DUE TO TYPE 2 DIABETES MELLITUS (HCC): ICD-10-CM

## 2024-04-08 DIAGNOSIS — E11.29 MICROALBUMINURIA DUE TO TYPE 2 DIABETES MELLITUS (HCC): ICD-10-CM

## 2024-04-08 DIAGNOSIS — Z11.59 ENCOUNTER FOR HEPATITIS C SCREENING TEST FOR LOW RISK PATIENT: ICD-10-CM

## 2024-04-08 DIAGNOSIS — E11.40 TYPE 2 DIABETES MELLITUS WITH DIABETIC NEUROPATHY, UNSPECIFIED WHETHER LONG TERM INSULIN USE (HCC): Primary | ICD-10-CM

## 2024-04-08 DIAGNOSIS — E11.69 HYPERLIPIDEMIA ASSOCIATED WITH TYPE 2 DIABETES MELLITUS (HCC): ICD-10-CM

## 2024-04-08 DIAGNOSIS — E78.5 HYPERLIPIDEMIA ASSOCIATED WITH TYPE 2 DIABETES MELLITUS (HCC): ICD-10-CM

## 2024-04-08 DIAGNOSIS — Z11.4 SCREENING FOR HIV (HUMAN IMMUNODEFICIENCY VIRUS): ICD-10-CM

## 2024-04-08 DIAGNOSIS — K21.9 GASTROESOPHAGEAL REFLUX DISEASE, UNSPECIFIED WHETHER ESOPHAGITIS PRESENT: ICD-10-CM

## 2024-04-08 DIAGNOSIS — R39.198 DIFFICULTY URINATING: ICD-10-CM

## 2024-04-08 PROCEDURE — 3044F HG A1C LEVEL LT 7.0%: CPT | Performed by: FAMILY MEDICINE

## 2024-04-08 PROCEDURE — 99214 OFFICE O/P EST MOD 30 MIN: CPT | Performed by: FAMILY MEDICINE

## 2024-04-08 RX ORDER — CETIRIZINE HYDROCHLORIDE 10 MG/1
10 TABLET ORAL DAILY
Qty: 90 TABLET | Refills: 3 | Status: SHIPPED | OUTPATIENT
Start: 2024-04-08

## 2024-04-08 RX ORDER — DAPAGLIFLOZIN 10 MG/1
10 TABLET, FILM COATED ORAL EVERY MORNING
Qty: 90 TABLET | Refills: 2 | Status: SHIPPED | OUTPATIENT
Start: 2024-04-08

## 2024-04-08 RX ORDER — OMEPRAZOLE 40 MG/1
CAPSULE, DELAYED RELEASE ORAL
Qty: 90 CAPSULE | Refills: 2 | Status: SHIPPED | OUTPATIENT
Start: 2024-04-08

## 2024-04-08 RX ORDER — EZETIMIBE 10 MG/1
10 TABLET ORAL DAILY
Qty: 90 TABLET | Refills: 2 | Status: SHIPPED | OUTPATIENT
Start: 2024-04-08

## 2024-04-08 ASSESSMENT — ENCOUNTER SYMPTOMS
SORE THROAT: 0
COUGH: 0
CONSTIPATION: 0
DIARRHEA: 0
NAUSEA: 0
ABDOMINAL PAIN: 0
VOMITING: 0

## 2024-04-08 NOTE — PROGRESS NOTES
[address] [place] [address]               [phone]        Frank Brownlee is a 59 y.o. male and presents with Follow-up (Lab results)           Assessment/Plan:  Frank was seen today for follow-up.    Diagnoses and all orders for this visit:    Type 2 diabetes mellitus with diabetic neuropathy, unspecified whether long term insulin use (HCC)  -     dapagliflozin (FARXIGA) 10 MG tablet; Take 1 tablet by mouth every morning  -     ezetimibe (ZETIA) 10 MG tablet; Take 1 tablet by mouth daily  -     TSH; Future  -     Microalbumin / Creatinine Urine Ratio; Future  -     Lipid Panel; Future  -     Hemoglobin A1C; Future  -     Comprehensive Metabolic Panel; Future  -     CBC with Auto Differential; Future  -     Urinalysis with Microscopic; Future    Microalbuminuria due to type 2 diabetes mellitus (HCC)  -     dapagliflozin (FARXIGA) 10 MG tablet; Take 1 tablet by mouth every morning    Hyperlipidemia associated with type 2 diabetes mellitus (HCC)    Screening for HIV (human immunodeficiency virus)    Encounter for hepatitis C screening test for low risk patient    Gastroesophageal reflux disease, unspecified whether esophagitis present  -     omeprazole (PRILOSEC) 40 MG delayed release capsule; Take one tab po daily    Seasonal allergic rhinitis, unspecified trigger  -     cetirizine (ZYRTEC) 10 MG tablet; Take 1 tablet by mouth daily    Difficulty urinating  -     [facility]    Gross hematuria  -     [facility]      DM2: still well controlled; continue farxiga; has more microalbuminuria than before and has been consistently taking medications; continue current dose; re-eval in 6 months    HLD: goal LDL<70; encouraged dietary changes; maxed on zetia    Screen HIV/Hep C with next set of labs    GERD: controlled on prilosec; refilled    Encouraged to contact [facility] to see if he can get earlier appt; has noted gross hematuria

## 2024-04-11 PROBLEM — R89.8 EOSINOPHIL COUNT RAISED: Status: ACTIVE | Noted: 2024-04-11

## 2024-04-11 PROBLEM — R18.8 ABDOMINAL WALL FLUID COLLECTIONS: Status: ACTIVE | Noted: 2017-09-18

## 2024-04-11 PROBLEM — D72.824 BASOPHILIA: Status: ACTIVE | Noted: 2024-04-11

## 2024-07-28 DIAGNOSIS — G43.809 OTHER MIGRAINE WITHOUT STATUS MIGRAINOSUS, NOT INTRACTABLE: ICD-10-CM

## 2024-07-29 RX ORDER — SUMATRIPTAN 50 MG/1
TABLET, FILM COATED ORAL
Qty: 9 TABLET | Refills: 0 | Status: SHIPPED | OUTPATIENT
Start: 2024-07-29

## 2024-08-27 DIAGNOSIS — G43.809 OTHER MIGRAINE WITHOUT STATUS MIGRAINOSUS, NOT INTRACTABLE: ICD-10-CM

## 2024-08-29 RX ORDER — SUMATRIPTAN 50 MG/1
TABLET, FILM COATED ORAL
Qty: 9 TABLET | Refills: 0 | Status: SHIPPED | OUTPATIENT
Start: 2024-08-29

## 2024-09-04 LAB — HBA1C MFR BLD HPLC: 5.4 %

## 2024-09-24 DIAGNOSIS — G43.809 OTHER MIGRAINE WITHOUT STATUS MIGRAINOSUS, NOT INTRACTABLE: ICD-10-CM

## 2024-09-25 RX ORDER — SUMATRIPTAN 50 MG/1
TABLET, FILM COATED ORAL
Qty: 9 TABLET | Refills: 0 | Status: SHIPPED | OUTPATIENT
Start: 2024-09-25

## 2024-10-08 ENCOUNTER — TELEPHONE (OUTPATIENT)
Facility: CLINIC | Age: 59
End: 2024-10-08

## 2024-10-08 ENCOUNTER — TELEMEDICINE (OUTPATIENT)
Facility: CLINIC | Age: 59
End: 2024-10-08
Payer: MEDICAID

## 2024-10-08 DIAGNOSIS — M25.512 CHRONIC PAIN OF BOTH SHOULDERS: ICD-10-CM

## 2024-10-08 DIAGNOSIS — M54.50 CHRONIC LOW BACK PAIN, UNSPECIFIED BACK PAIN LATERALITY, UNSPECIFIED WHETHER SCIATICA PRESENT: ICD-10-CM

## 2024-10-08 DIAGNOSIS — Z96.89 S/P INSERTION OF SPINAL CORD STIMULATOR: Primary | ICD-10-CM

## 2024-10-08 DIAGNOSIS — G43.809 OTHER MIGRAINE WITHOUT STATUS MIGRAINOSUS, NOT INTRACTABLE: ICD-10-CM

## 2024-10-08 DIAGNOSIS — M25.511 CHRONIC PAIN OF BOTH SHOULDERS: ICD-10-CM

## 2024-10-08 DIAGNOSIS — G89.29 CHRONIC LOW BACK PAIN, UNSPECIFIED BACK PAIN LATERALITY, UNSPECIFIED WHETHER SCIATICA PRESENT: ICD-10-CM

## 2024-10-08 DIAGNOSIS — E11.40 TYPE 2 DIABETES MELLITUS WITH DIABETIC NEUROPATHY, UNSPECIFIED WHETHER LONG TERM INSULIN USE (HCC): ICD-10-CM

## 2024-10-08 DIAGNOSIS — G89.29 CHRONIC PAIN OF BOTH SHOULDERS: ICD-10-CM

## 2024-10-08 PROBLEM — T14.8XXA INJURY DUE TO FOREIGN BODY: Status: RESOLVED | Noted: 2021-03-31 | Resolved: 2024-10-08

## 2024-10-08 PROBLEM — M79.652 PAIN OF LEFT THIGH: Status: RESOLVED | Noted: 2023-06-13 | Resolved: 2024-10-08

## 2024-10-08 PROBLEM — Z12.11 SPECIAL SCREENING FOR MALIGNANT NEOPLASMS, COLON: Status: RESOLVED | Noted: 2021-03-31 | Resolved: 2024-10-08

## 2024-10-08 PROBLEM — R10.32 ABDOMINAL PAIN, LLQ: Status: RESOLVED | Noted: 2021-03-31 | Resolved: 2024-10-08

## 2024-10-08 PROBLEM — K57.92 DIVERTICULITIS: Status: RESOLVED | Noted: 2017-09-06 | Resolved: 2024-10-08

## 2024-10-08 PROBLEM — R18.8 ABDOMINAL WALL FLUID COLLECTIONS: Status: RESOLVED | Noted: 2017-09-18 | Resolved: 2024-10-08

## 2024-10-08 PROCEDURE — 3044F HG A1C LEVEL LT 7.0%: CPT | Performed by: FAMILY MEDICINE

## 2024-10-08 PROCEDURE — 99214 OFFICE O/P EST MOD 30 MIN: CPT | Performed by: FAMILY MEDICINE

## 2024-10-08 RX ORDER — SUMATRIPTAN 50 MG/1
TABLET, FILM COATED ORAL
Qty: 9 TABLET | Refills: 5 | Status: SHIPPED | OUTPATIENT
Start: 2024-10-08

## 2024-10-08 SDOH — ECONOMIC STABILITY: FOOD INSECURITY: WITHIN THE PAST 12 MONTHS, THE FOOD YOU BOUGHT JUST DIDN'T LAST AND YOU DIDN'T HAVE MONEY TO GET MORE.: NEVER TRUE

## 2024-10-08 SDOH — ECONOMIC STABILITY: INCOME INSECURITY: HOW HARD IS IT FOR YOU TO PAY FOR THE VERY BASICS LIKE FOOD, HOUSING, MEDICAL CARE, AND HEATING?: NOT HARD AT ALL

## 2024-10-08 ASSESSMENT — PATIENT HEALTH QUESTIONNAIRE - PHQ9
2. FEELING DOWN, DEPRESSED OR HOPELESS: NOT AT ALL
8. MOVING OR SPEAKING SO SLOWLY THAT OTHER PEOPLE COULD HAVE NOTICED. OR THE OPPOSITE, BEING SO FIGETY OR RESTLESS THAT YOU HAVE BEEN MOVING AROUND A LOT MORE THAN USUAL: NOT AT ALL
1. LITTLE INTEREST OR PLEASURE IN DOING THINGS: NOT AT ALL
3. TROUBLE FALLING OR STAYING ASLEEP: NOT AT ALL
SUM OF ALL RESPONSES TO PHQ QUESTIONS 1-9: 0
5. POOR APPETITE OR OVEREATING: NOT AT ALL
SUM OF ALL RESPONSES TO PHQ QUESTIONS 1-9: 0
4. FEELING TIRED OR HAVING LITTLE ENERGY: NOT AT ALL
SUM OF ALL RESPONSES TO PHQ QUESTIONS 1-9: 0
6. FEELING BAD ABOUT YOURSELF - OR THAT YOU ARE A FAILURE OR HAVE LET YOURSELF OR YOUR FAMILY DOWN: NOT AT ALL
9. THOUGHTS THAT YOU WOULD BE BETTER OFF DEAD, OR OF HURTING YOURSELF: NOT AT ALL
SUM OF ALL RESPONSES TO PHQ9 QUESTIONS 1 & 2: 0
SUM OF ALL RESPONSES TO PHQ QUESTIONS 1-9: 0
10. IF YOU CHECKED OFF ANY PROBLEMS, HOW DIFFICULT HAVE THESE PROBLEMS MADE IT FOR YOU TO DO YOUR WORK, TAKE CARE OF THINGS AT HOME, OR GET ALONG WITH OTHER PEOPLE: NOT DIFFICULT AT ALL
7. TROUBLE CONCENTRATING ON THINGS, SUCH AS READING THE NEWSPAPER OR WATCHING TELEVISION: NOT AT ALL

## 2024-10-08 NOTE — PROGRESS NOTES
[address] [place] [address]               [phone]        Frank Brownlee is a 59 y.o. male and presents with Follow-up           Assessment/Plan:  Frank \"Kirk\" was seen today for follow-up.    Diagnoses and all orders for this visit:    S/P insertion of spinal cord stimulator    Chronic low back pain, unspecified back pain laterality, unspecified whether sciatica present    Type 2 diabetes mellitus with diabetic neuropathy, unspecified whether long term insulin use (HCC)    Chronic pain of both shoulders        Continue management through Neurosurgery/Pain Management; has increased his doses some nights to 6 tablets/day of narcotic instead of 5; advised to discuss this with pain management  DM2: controlled; A1c reviewed from hospitalization; advised to update remainder of labs and f/u in 4 weeks to discuss  Shoulder pain: home exercises recommended; has been using salonpas heating pads; encouraged massage therapy; f/u at next visit for formal PT if no improvement    Follow up and disposition:   Return in about 5 weeks (around 11/13/2024), or if symptoms worsen or fail to improve, for follow up -15 min, review lab results 0815.      Health Maintenance:   Health Maintenance   Topic Date Due    HIV screen  Never done    Diabetic retinal exam  Never done    Hepatitis C screen  Never done    Hepatitis B vaccine (1 of 3 - 19+ 3-dose series) Never done    Pneumococcal 0-64 years Vaccine (2 of 2 - PCV) 01/01/2017    Diabetic foot exam  05/08/2020    Flu vaccine (1) 08/01/2024    COVID-19 Vaccine (4 - 2023-24 season) 09/01/2024    Depression Monitoring  09/27/2024    DTaP/Tdap/Td vaccine (1 - Tdap) 10/29/2024 (Originally 5/12/2012)    Shingles vaccine (1 of 2) 01/23/2027 (Originally 1/23/2017)    A1C test (Diabetic or Prediabetic)  04/01/2025    Diabetic Alb to Cr ratio (uACR) test  04/01/2025    Lipids  04/01/2025    GFR test (Diabetes, CKD 3-4, OR last GFR 15-59)  04/01/2025

## 2024-10-08 NOTE — PROGRESS NOTES
Frank Brownlee had concerns including Follow-up. for today's visit .     1. \"Have you been to the ER, urgent care clinic since your last visit?  Hospitalized since your last visit?\" .NO    2. \"Have you seen or consulted any other health care providers outside of the [facility] since your last visit?\" No    3. For patients aged 45-75: Has the patient had a colonoscopy / FIT/ Cologuard? Yes      If the patient is female:    4. For patients aged 40-74: Has the patient had a mammogram within the past 2 years? N/A      5. For patients aged 21-65: Has the patient had a pap smear? {Cancer Care Gap present? N/A              9/27/2023     9:23 AM   PHQ-9    Little interest or pleasure in doing things 0   Feeling down, depressed, or hopeless 0   PHQ-2 Score 0   PHQ-9 Total Score 0          Health Maintenance Due   Topic Date Due    HIV screen  Never done    Diabetic retinal exam  Never done    Hepatitis C screen  Never done    Hepatitis B vaccine (1 of 3 - 19+ 3-dose series) Never done    Pneumococcal 0-64 years Vaccine (2 of 2 - PCV) 01/01/2017    Diabetic foot exam  05/08/2020    Flu vaccine (1) 08/01/2024    COVID-19 Vaccine (4 - 2023-24 season) 09/01/2024    Depression Monitoring  09/27/2024

## 2024-11-06 LAB
A/G RATIO: 1.6 RATIO (ref 1.1–2.6)
ALBUMIN: 4.2 G/DL (ref 3.5–5)
ALP BLD-CCNC: 91 U/L (ref 25–115)
ALT SERPL-CCNC: 18 U/L (ref 5–40)
ANION GAP SERPL CALCULATED.3IONS-SCNC: 11 MMOL/L (ref 3–15)
AST SERPL-CCNC: 23 U/L (ref 10–37)
BACTERIA: NEGATIVE
BASOPHILS ABSOLUTE: 0.1 K/UL (ref 0–0.2)
BASOPHILS RELATIVE PERCENT: 1 % (ref 0–2)
BILIRUB SERPL-MCNC: 0.3 MG/DL (ref 0.2–1.2)
BILIRUB SERPL-MCNC: NEGATIVE MG/DL
BLOOD: NEGATIVE
BUN BLDV-MCNC: 11 MG/DL (ref 6–22)
CALCIUM SERPL-MCNC: 9.1 MG/DL (ref 8.4–10.5)
CHLORIDE BLD-SCNC: 101 MMOL/L (ref 98–110)
CHOLESTEROL, TOTAL: 159 MG/DL (ref 110–200)
CHOLESTEROL/HDL RATIO: 2.9 (ref 0–5)
CLARITY, UA: CLEAR
CO2: 26 MMOL/L (ref 20–32)
COLOR, UA: YELLOW
CREAT SERPL-MCNC: 0.8 MG/DL (ref 0.5–1.2)
CREATININE URINE: 67 MG/DL
EOSINOPHIL # BLD: 5 % (ref 0–6)
EOSINOPHILS ABSOLUTE: 0.2 K/UL (ref 0–0.5)
EPITHELIAL CELLS: ABNORMAL /HPF
GFR, ESTIMATED: >60 ML/MIN/1.73 SQ.M.
GLOBULIN: 2.7 G/DL (ref 2–4)
GLUCOSE: 79 MG/DL (ref 70–99)
GLUCOSE: ABNORMAL MG/DL
HCT VFR BLD CALC: 45.2 % (ref 39.3–51.6)
HDLC SERPL-MCNC: 55 MG/DL
HEMOGLOBIN: 14.5 G/DL (ref 13.1–17.2)
HYALINE CASTS: ABNORMAL /LPF (ref 0–2)
KETONES, URINE: NEGATIVE MG/DL
LDL CHOLESTEROL: 81 MG/DL (ref 50–99)
LDL/HDL RATIO: 1.5
LEUKOCYTE ESTERASE, URINE: NEGATIVE
LYMPHOCYTES # BLD: 16 % (ref 20–45)
LYMPHOCYTES ABSOLUTE: 0.7 K/UL (ref 1–4.8)
MCH RBC QN AUTO: 29 PG (ref 26–34)
MCHC RBC AUTO-ENTMCNC: 32 G/DL (ref 31–36)
MCV RBC AUTO: 91 FL (ref 80–95)
MICROALB/CREAT RATIO (UG/MG CREAT.): NORMAL
MICROALBUMIN/CREAT 24H UR: <12 MG/L (ref 0.1–17)
MONOCYTES ABSOLUTE: 0.5 K/UL (ref 0.1–1)
MONOCYTES: 10 % (ref 3–12)
NEUTROPHILS ABSOLUTE: 3.1 K/UL (ref 1.8–7.7)
NEUTROPHILS: 68 % (ref 40–75)
NITRITE, URINE: NEGATIVE
NON-HDL CHOLESTEROL: 104 MG/DL
PDW BLD-RTO: 12.5 % (ref 10–15.5)
PH, URINE: 6 PH (ref 5–8)
PLATELET # BLD: 190 K/UL (ref 140–440)
PMV BLD AUTO: 9.9 FL (ref 9–13)
POTASSIUM SERPL-SCNC: 4.1 MMOL/L (ref 3.5–5.5)
PROTEIN UA: NEGATIVE MG/DL
RBC # BLD: 4.99 M/UL (ref 3.8–5.8)
RBC URINE: ABNORMAL /HPF
SODIUM BLD-SCNC: 138 MMOL/L (ref 133–145)
SPECIFIC GRAVITY UA: 1.02 (ref 1–1.03)
TOTAL PROTEIN: 6.9 G/DL (ref 6.4–8.3)
TRIGL SERPL-MCNC: 111 MG/DL (ref 40–149)
TSH SERPL DL<=0.05 MIU/L-ACNC: 1.12 MCU/ML (ref 0.27–4.2)
UROBILINOGEN, URINE: 0.2 MG/DL
VLDLC SERPL CALC-MCNC: 22 MG/DL (ref 8–30)
WBC # BLD: 4.6 K/UL (ref 4–11)
WBC UA: ABNORMAL /HPF (ref 0–2)

## 2024-11-12 PROBLEM — E11.29 MICROALBUMINURIA DUE TO TYPE 2 DIABETES MELLITUS (HCC): Status: RESOLVED | Noted: 2023-06-14 | Resolved: 2024-11-12

## 2024-11-12 PROBLEM — R80.9 MICROALBUMINURIA DUE TO TYPE 2 DIABETES MELLITUS (HCC): Status: RESOLVED | Noted: 2023-06-14 | Resolved: 2024-11-12

## 2024-11-25 ENCOUNTER — TELEMEDICINE (OUTPATIENT)
Facility: CLINIC | Age: 59
End: 2024-11-25
Payer: MEDICAID

## 2024-11-25 DIAGNOSIS — J01.40 ACUTE NON-RECURRENT PANSINUSITIS: Primary | ICD-10-CM

## 2024-11-25 DIAGNOSIS — K21.9 GASTROESOPHAGEAL REFLUX DISEASE, UNSPECIFIED WHETHER ESOPHAGITIS PRESENT: ICD-10-CM

## 2024-11-25 DIAGNOSIS — E78.5 HYPERLIPIDEMIA ASSOCIATED WITH TYPE 2 DIABETES MELLITUS (HCC): ICD-10-CM

## 2024-11-25 DIAGNOSIS — E11.40 TYPE 2 DIABETES MELLITUS WITH DIABETIC NEUROPATHY, UNSPECIFIED WHETHER LONG TERM INSULIN USE (HCC): ICD-10-CM

## 2024-11-25 DIAGNOSIS — R80.9 MICROALBUMINURIA DUE TO TYPE 2 DIABETES MELLITUS (HCC): ICD-10-CM

## 2024-11-25 DIAGNOSIS — E11.69 HYPERLIPIDEMIA ASSOCIATED WITH TYPE 2 DIABETES MELLITUS (HCC): ICD-10-CM

## 2024-11-25 DIAGNOSIS — E11.29 MICROALBUMINURIA DUE TO TYPE 2 DIABETES MELLITUS (HCC): ICD-10-CM

## 2024-11-25 DIAGNOSIS — J30.2 SEASONAL ALLERGIC RHINITIS, UNSPECIFIED TRIGGER: ICD-10-CM

## 2024-11-25 PROCEDURE — 99214 OFFICE O/P EST MOD 30 MIN: CPT | Performed by: FAMILY MEDICINE

## 2024-11-25 PROCEDURE — 3044F HG A1C LEVEL LT 7.0%: CPT | Performed by: FAMILY MEDICINE

## 2024-11-25 RX ORDER — OMEPRAZOLE 40 MG/1
CAPSULE, DELAYED RELEASE ORAL
Qty: 90 CAPSULE | Refills: 2 | Status: SHIPPED | OUTPATIENT
Start: 2024-11-25

## 2024-11-25 RX ORDER — GUAIFENESIN 600 MG/1
600 TABLET, EXTENDED RELEASE ORAL 2 TIMES DAILY
Qty: 60 TABLET | Refills: 0 | Status: SHIPPED | OUTPATIENT
Start: 2024-11-25 | End: 2024-12-25

## 2024-11-25 RX ORDER — DAPAGLIFLOZIN 10 MG/1
10 TABLET, FILM COATED ORAL EVERY MORNING
Qty: 90 TABLET | Refills: 2 | Status: SHIPPED | OUTPATIENT
Start: 2024-11-25

## 2024-11-25 RX ORDER — EZETIMIBE 10 MG/1
10 TABLET ORAL DAILY
Qty: 90 TABLET | Refills: 2 | Status: SHIPPED | OUTPATIENT
Start: 2024-11-25

## 2024-11-25 RX ORDER — SOD CHLOR,BICARB/SQUEEZ BOTTLE
PACKET, WITH RINSE DEVICE NASAL
Qty: 1 EACH | Refills: 0 | Status: SHIPPED | OUTPATIENT
Start: 2024-11-25

## 2024-11-25 RX ORDER — DOXYCYCLINE HYCLATE 100 MG
100 TABLET ORAL 2 TIMES DAILY
Qty: 14 TABLET | Refills: 0 | Status: SHIPPED | OUTPATIENT
Start: 2024-11-25 | End: 2024-12-02

## 2024-11-25 ASSESSMENT — ENCOUNTER SYMPTOMS
COUGH: 1
SINUS PRESSURE: 1

## 2024-11-25 NOTE — PROGRESS NOTES
[address] [place] [address] [phone]        Frank Brownlee is a 59 y.o. male and presents with Follow-up           Assessment/Plan:  Frank \"Kirk\" was seen today for follow-up.    Diagnoses and all orders for this visit:    Acute non-recurrent pansinusitis  -     doxycycline hyclate (VIBRA-TABS) 100 MG tablet; Take 1 tablet by mouth 2 times daily for 7 days  -     Hypertonic Nasal Wash (SINUS RINSE BOTTLE KIT) PACK; Wash sinus 1-3x daily prn sinus congestion or pressure  -     guaiFENesin (MUCINEX) 600 MG extended release tablet; Take 1 tablet by mouth 2 times daily Prn congestion    Hyperlipidemia associated with type 2 diabetes mellitus (HCC)    Gastroesophageal reflux disease, unspecified whether esophagitis present  -     omeprazole (PRILOSEC) 40 MG delayed release capsule; Take one tab po daily    Seasonal allergic rhinitis, unspecified trigger    Type 2 diabetes mellitus with diabetic neuropathy, unspecified whether long term insulin use (HCC)  -     dapagliflozin (FARXIGA) 10 MG tablet; Take 1 tablet by mouth every morning  -     ezetimibe (ZETIA) 10 MG tablet; Take 1 tablet by mouth daily    Microalbuminuria due to type 2 diabetes mellitus (HCC)  -     dapagliflozin (FARXIGA) 10 MG tablet; Take 1 tablet by mouth every morning      Acute sinusitis: begin Doxy--states brown/green sputum noted; associated fevers; f/u in 2 days if fevers persist; take for 7 day course; encouraged NeilMed sinus rinse and mucinex bid;     HLD: educated on dietary changes; continue current meds; has been eating fast food breakfast qam at [facility]    gERD: continue current meds  DM2 well controlled; farxiga controlling microalbuminuria; eGFR>60      Follow up and disposition:   Return in about 3 months (around 2/25/2025), or if symptoms worsen or fail to improve, for follow up -15 min.      Health Maintenance:   Health Maintenance   Topic Date Due    HIV screen  Never done

## 2025-01-20 DIAGNOSIS — G43.809 OTHER MIGRAINE WITHOUT STATUS MIGRAINOSUS, NOT INTRACTABLE: ICD-10-CM

## 2025-01-20 RX ORDER — SUMATRIPTAN 50 MG/1
TABLET, FILM COATED ORAL
Qty: 9 TABLET | Refills: 0 | Status: SHIPPED | OUTPATIENT
Start: 2025-01-20

## 2025-02-15 DIAGNOSIS — G43.809 OTHER MIGRAINE WITHOUT STATUS MIGRAINOSUS, NOT INTRACTABLE: ICD-10-CM

## 2025-02-16 RX ORDER — SUMATRIPTAN 50 MG/1
TABLET, FILM COATED ORAL
Qty: 9 TABLET | Refills: 5 | Status: SHIPPED | OUTPATIENT
Start: 2025-02-16

## 2025-02-23 DIAGNOSIS — J30.2 SEASONAL ALLERGIC RHINITIS, UNSPECIFIED TRIGGER: ICD-10-CM

## 2025-02-26 RX ORDER — CETIRIZINE HYDROCHLORIDE 10 MG/1
10 TABLET ORAL DAILY
Qty: 90 TABLET | Refills: 3 | Status: SHIPPED | OUTPATIENT
Start: 2025-02-26

## 2025-02-26 RX ORDER — CETIRIZINE HYDROCHLORIDE 10 MG/1
10 TABLET, FILM COATED ORAL DAILY
Qty: 90 TABLET | Refills: 0 | OUTPATIENT
Start: 2025-02-26

## 2025-03-05 ENCOUNTER — TELEMEDICINE (OUTPATIENT)
Facility: CLINIC | Age: 60
End: 2025-03-05

## 2025-03-05 DIAGNOSIS — G89.29 CHRONIC BILATERAL LOW BACK PAIN WITH BILATERAL SCIATICA: ICD-10-CM

## 2025-03-05 DIAGNOSIS — J30.9 ALLERGIC RHINITIS, UNSPECIFIED SEASONALITY, UNSPECIFIED TRIGGER: ICD-10-CM

## 2025-03-05 DIAGNOSIS — L98.9 SKIN LESION: ICD-10-CM

## 2025-03-05 DIAGNOSIS — L40.50 PSORIATIC ARTHRITIS (HCC): Primary | ICD-10-CM

## 2025-03-05 DIAGNOSIS — Z87.898 HISTORY OF NAUSEA: ICD-10-CM

## 2025-03-05 DIAGNOSIS — E11.40 TYPE 2 DIABETES MELLITUS WITH DIABETIC NEUROPATHY, UNSPECIFIED WHETHER LONG TERM INSULIN USE (HCC): ICD-10-CM

## 2025-03-05 DIAGNOSIS — M54.42 CHRONIC BILATERAL LOW BACK PAIN WITH BILATERAL SCIATICA: ICD-10-CM

## 2025-03-05 DIAGNOSIS — M54.41 CHRONIC BILATERAL LOW BACK PAIN WITH BILATERAL SCIATICA: ICD-10-CM

## 2025-03-05 DIAGNOSIS — G43.009 MIGRAINE WITHOUT AURA AND WITHOUT STATUS MIGRAINOSUS, NOT INTRACTABLE: ICD-10-CM

## 2025-03-05 RX ORDER — ONDANSETRON 4 MG/1
TABLET, ORALLY DISINTEGRATING ORAL
Qty: 30 TABLET | Refills: 1 | Status: SHIPPED | OUTPATIENT
Start: 2025-03-05

## 2025-03-05 RX ORDER — TRIAMCINOLONE ACETONIDE 0.25 MG/G
OINTMENT TOPICAL
Qty: 80 G | Refills: 0 | Status: SHIPPED | OUTPATIENT
Start: 2025-03-05 | End: 2025-03-12

## 2025-03-05 RX ORDER — FLUTICASONE PROPIONATE 50 MCG
2 SPRAY, SUSPENSION (ML) NASAL DAILY
Qty: 32 G | Refills: 5 | Status: SHIPPED | OUTPATIENT
Start: 2025-03-05

## 2025-03-05 RX ORDER — FLUTICASONE PROPIONATE 50 MCG
2 SPRAY, SUSPENSION (ML) NASAL DAILY
Qty: 16 G | Status: CANCELLED | OUTPATIENT
Start: 2025-03-05

## 2025-03-05 RX ORDER — ONDANSETRON 4 MG/1
TABLET, ORALLY DISINTEGRATING ORAL
Status: CANCELLED | OUTPATIENT
Start: 2025-03-05

## 2025-03-05 SDOH — ECONOMIC STABILITY: FOOD INSECURITY: WITHIN THE PAST 12 MONTHS, YOU WORRIED THAT YOUR FOOD WOULD RUN OUT BEFORE YOU GOT MONEY TO BUY MORE.: NEVER TRUE

## 2025-03-05 SDOH — ECONOMIC STABILITY: FOOD INSECURITY: WITHIN THE PAST 12 MONTHS, THE FOOD YOU BOUGHT JUST DIDN'T LAST AND YOU DIDN'T HAVE MONEY TO GET MORE.: NEVER TRUE

## 2025-03-05 ASSESSMENT — ENCOUNTER SYMPTOMS
BLOOD IN STOOL: 0
DIARRHEA: 0
NAUSEA: 0
VOMITING: 0
CONSTIPATION: 0

## 2025-03-05 ASSESSMENT — PATIENT HEALTH QUESTIONNAIRE - PHQ9
SUM OF ALL RESPONSES TO PHQ QUESTIONS 1-9: 0
SUM OF ALL RESPONSES TO PHQ QUESTIONS 1-9: 0
1. LITTLE INTEREST OR PLEASURE IN DOING THINGS: NOT AT ALL
SUM OF ALL RESPONSES TO PHQ QUESTIONS 1-9: 0
2. FEELING DOWN, DEPRESSED OR HOPELESS: NOT AT ALL
SUM OF ALL RESPONSES TO PHQ QUESTIONS 1-9: 0
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
## 2025-05-10 DIAGNOSIS — G43.809 OTHER MIGRAINE WITHOUT STATUS MIGRAINOSUS, NOT INTRACTABLE: ICD-10-CM

## 2025-05-11 RX ORDER — SUMATRIPTAN 50 MG/1
TABLET, FILM COATED ORAL
Qty: 9 TABLET | Refills: 0 | OUTPATIENT
Start: 2025-05-11

## 2025-05-19 ENCOUNTER — OFFICE VISIT (OUTPATIENT)
Facility: CLINIC | Age: 60
End: 2025-05-19
Payer: MEDICAID

## 2025-05-19 VITALS
SYSTOLIC BLOOD PRESSURE: 120 MMHG | HEART RATE: 66 BPM | OXYGEN SATURATION: 93 % | HEIGHT: 68 IN | RESPIRATION RATE: 18 BRPM | DIASTOLIC BLOOD PRESSURE: 76 MMHG | BODY MASS INDEX: 28.61 KG/M2 | WEIGHT: 188.8 LBS

## 2025-05-19 DIAGNOSIS — F11.90 CHRONIC NARCOTIC USE: ICD-10-CM

## 2025-05-19 DIAGNOSIS — E11.40 TYPE 2 DIABETES MELLITUS WITH DIABETIC NEUROPATHY, UNSPECIFIED WHETHER LONG TERM INSULIN USE (HCC): ICD-10-CM

## 2025-05-19 DIAGNOSIS — L30.9 ECZEMA, UNSPECIFIED TYPE: ICD-10-CM

## 2025-05-19 DIAGNOSIS — M54.2 NECK PAIN: Primary | ICD-10-CM

## 2025-05-19 DIAGNOSIS — R80.9 MICROALBUMINURIA DUE TO TYPE 2 DIABETES MELLITUS (HCC): ICD-10-CM

## 2025-05-19 DIAGNOSIS — S61.019D: ICD-10-CM

## 2025-05-19 DIAGNOSIS — M54.9 CHRONIC BACK PAIN, UNSPECIFIED BACK LOCATION, UNSPECIFIED BACK PAIN LATERALITY: ICD-10-CM

## 2025-05-19 DIAGNOSIS — K21.9 GASTROESOPHAGEAL REFLUX DISEASE, UNSPECIFIED WHETHER ESOPHAGITIS PRESENT: ICD-10-CM

## 2025-05-19 DIAGNOSIS — E11.29 MICROALBUMINURIA DUE TO TYPE 2 DIABETES MELLITUS (HCC): ICD-10-CM

## 2025-05-19 DIAGNOSIS — G89.29 CHRONIC BACK PAIN, UNSPECIFIED BACK LOCATION, UNSPECIFIED BACK PAIN LATERALITY: ICD-10-CM

## 2025-05-19 PROCEDURE — 99214 OFFICE O/P EST MOD 30 MIN: CPT | Performed by: FAMILY MEDICINE

## 2025-05-19 RX ORDER — DAPAGLIFLOZIN 10 MG/1
10 TABLET, FILM COATED ORAL EVERY MORNING
Qty: 90 TABLET | Refills: 2 | Status: SHIPPED | OUTPATIENT
Start: 2025-05-19

## 2025-05-19 RX ORDER — EZETIMIBE 10 MG/1
10 TABLET ORAL DAILY
Qty: 90 TABLET | Refills: 2 | Status: SHIPPED | OUTPATIENT
Start: 2025-05-19

## 2025-05-19 RX ORDER — TRIAMCINOLONE ACETONIDE 1 MG/G
OINTMENT TOPICAL
Qty: 30 G | Refills: 0 | Status: SHIPPED | OUTPATIENT
Start: 2025-05-19 | End: 2026-05-19

## 2025-05-19 RX ORDER — OMEPRAZOLE 40 MG/1
CAPSULE, DELAYED RELEASE ORAL
Qty: 90 CAPSULE | Refills: 2 | Status: SHIPPED | OUTPATIENT
Start: 2025-05-19

## 2025-05-19 ASSESSMENT — PATIENT HEALTH QUESTIONNAIRE - PHQ9
1. LITTLE INTEREST OR PLEASURE IN DOING THINGS: NOT AT ALL
SUM OF ALL RESPONSES TO PHQ QUESTIONS 1-9: 0
2. FEELING DOWN, DEPRESSED OR HOPELESS: NOT AT ALL
SUM OF ALL RESPONSES TO PHQ QUESTIONS 1-9: 0

## 2025-05-19 ASSESSMENT — ENCOUNTER SYMPTOMS: SHORTNESS OF BREATH: 0

## 2025-05-19 NOTE — PROGRESS NOTES
[address] [place] [id number] [phone]        Frank Brownlee is a 60 y.o. male and presents with Pain (Left Shoulder, Neck, and Arm Pain a few days now)           Assessment/Plan:  Frank \"Kirk\" was seen today for pain.    Diagnoses and all orders for this visit:    Neck pain    Chronic back pain, unspecified back location, unspecified back pain laterality    Chronic narcotic use    Laceration of thumb without foreign body without damage to nail, unspecified laterality, subsequent encounter    Type 2 diabetes mellitus with diabetic neuropathy, unspecified whether long term insulin use (HCC)  -     ezetimibe (ZETIA) 10 MG tablet; Take 1 tablet by mouth daily  -     dapagliflozin (FARXIGA) 10 MG tablet; Take 1 tablet by mouth every morning    Gastroesophageal reflux disease, unspecified whether esophagitis present  -     omeprazole (PRILOSEC) 40 MG delayed release capsule; Take one tab po daily    Microalbuminuria due to type 2 diabetes mellitus (HCC)  -     dapagliflozin (FARXIGA) 10 MG tablet; Take 1 tablet by mouth every morning    Eczema, unspecified type  -     triamcinolone (KENALOG) 0.1 % ointment; AAA bid for max 2 weeks      Neck pain/upper back: begin HEP/lidocaine patches; advised to discuss adjusting his muscle relaxer with pain specialist to help with acute pain; reviewed CT cervical spine from October; declines formal PT; f/u in 2 weeks if no improvement  Chronic back pain: continue management with pain management as above; recommend he discuss pain pump with other patients using online resources  Laceration healing well  DM2: overdue for labs; continue Farxiga; update labs this week  GERD: controlled; continue current meds  Update labs this week; f/u in 4 weeks for re-evaluation  Eczema: treat with triamcinolone; declines biologics; encouraged to schedule f/u appt with [facility]ermatology at [facility] overdue        Follow up and disposition:   Return in about 1

## 2025-05-19 NOTE — PROGRESS NOTES
Frank Brownlee is a 60 y.o. male (: 1965) presenting to address:    Chief Complaint   Patient presents with    Pain     Left Shoulder, Neck, and Arm Pain a few days now       Vitals:    25 1252   BP: 120/76   Pulse: 66   Resp: 18   SpO2: 93%       \"Have you been to the ER, urgent care clinic since your last visit?  Hospitalized since your last visit?\"    Yes, [address]    “Have you seen or consulted any other health care providers outside of [facility] since your last visit?”    Yes, [facility]

## 2025-07-09 LAB
BACTERIA, URINE: NEGATIVE
BASOPHILS # BLD: 2 % (ref 0–2)
BASOPHILS ABSOLUTE: 0.1 K/UL (ref 0–0.2)
BILIRUBIN, URINE: NEGATIVE
CHOLESTEROL, TOTAL: 167 MG/DL (ref 110–200)
CHOLESTEROL/HDL RATIO: 3.3 (ref 0–5)
CLARITY, UA: CLEAR
COLOR, UA: YELLOW
CREATININE, URINE  MG/DL: 173 MG/DL
EOSINOPHIL # BLD: 5 % (ref 0–6)
EOSINOPHILS ABSOLUTE: 0.2 K/UL (ref 0–0.5)
ESTIMATED AVERAGE GLUCOSE: 109 MG/DL (ref 91–123)
GLUCOSE URINE: ABNORMAL MG/DL
HBA1C MFR BLD: 5.4 % (ref 4.8–5.6)
HCT VFR BLD CALC: 42 % (ref 39.3–51.6)
HDLC SERPL-MCNC: 51 MG/DL
HEMOGLOBIN: 14.2 G/DL (ref 13.1–17.2)
HYALINE CASTS: ABNORMAL /LPF (ref 0–2)
KETONES, URINE: NEGATIVE MG/DL
LDL CHOLESTEROL: 86 MG/DL (ref 50–99)
LDL/HDL RATIO: 1.7
LEUKOCYTE ESTERASE, URINE: NEGATIVE
LYMPHOCYTES # BLD: 22 % (ref 20–45)
LYMPHOCYTES ABSOLUTE: 0.9 K/UL (ref 1–4.8)
MCH RBC QN AUTO: 30 PG (ref 26–34)
MCHC RBC AUTO-ENTMCNC: 34 G/DL (ref 31–36)
MCV RBC AUTO: 89 FL (ref 80–95)
MICROALBUMIN/CREAT 24H UR: 35.9 MG/L (ref 0.1–17)
MICROALBUMIN/CREAT UR-RTO: 20.8 (ref 0–30)
MONOCYTES ABSOLUTE: 0.2 K/UL (ref 0.1–1)
MONOCYTES: 6 % (ref 3–12)
NEUTROPHILS ABSOLUTE: 2.5 K/UL (ref 1.8–7.7)
NEUTROPHILS SEGMENTED: 65 % (ref 40–75)
NITRITE, URINE: NEGATIVE
NON-HDL CHOLESTEROL: 116 MG/DL
OCCULT BLOOD,URINE: NEGATIVE
PDW BLD-RTO: 12.2 % (ref 10–15.5)
PH, URINE: 6 PH (ref 5–8)
PLATELET # BLD: 209 K/UL (ref 140–440)
PMV BLD AUTO: 10.3 FL (ref 9–13)
PROTEIN, URINE: ABNORMAL MG/DL
RBC # BLD: 4.72 M/UL (ref 3.8–5.8)
RBC URINE: ABNORMAL /HPF
SPECIFIC GRAVITY UA: 1.02 (ref 1–1.03)
SQUAMOUS EPITHELIAL CELLS: ABNORMAL /HPF
TRIGL SERPL-MCNC: 148 MG/DL (ref 40–149)
TSH SERPL DL<=0.05 MIU/L-ACNC: 1.09 MCU/ML (ref 0.27–4.2)
UROBILINOGEN, URINE: 0.2 MG/DL
VLDLC SERPL CALC-MCNC: 30 MG/DL (ref 8–30)
WBC # BLD: 3.8 K/UL (ref 4–11)
WBC URINE: ABNORMAL /HPF (ref 0–2)

## 2025-07-11 ENCOUNTER — OFFICE VISIT (OUTPATIENT)
Facility: CLINIC | Age: 60
End: 2025-07-11
Payer: MEDICAID

## 2025-07-11 VITALS
OXYGEN SATURATION: 95 % | RESPIRATION RATE: 16 BRPM | HEART RATE: 70 BPM | SYSTOLIC BLOOD PRESSURE: 133 MMHG | DIASTOLIC BLOOD PRESSURE: 89 MMHG | BODY MASS INDEX: 28.34 KG/M2 | HEIGHT: 68 IN | WEIGHT: 187 LBS

## 2025-07-11 DIAGNOSIS — F11.90 CHRONIC NARCOTIC USE: ICD-10-CM

## 2025-07-11 DIAGNOSIS — M54.9 CHRONIC BACK PAIN, UNSPECIFIED BACK LOCATION, UNSPECIFIED BACK PAIN LATERALITY: ICD-10-CM

## 2025-07-11 DIAGNOSIS — Z01.810 PRE-OPERATIVE CARDIOVASCULAR EXAMINATION: ICD-10-CM

## 2025-07-11 DIAGNOSIS — E11.40 TYPE 2 DIABETES MELLITUS WITH DIABETIC NEUROPATHY, UNSPECIFIED WHETHER LONG TERM INSULIN USE (HCC): ICD-10-CM

## 2025-07-11 DIAGNOSIS — G89.29 CHRONIC BACK PAIN, UNSPECIFIED BACK LOCATION, UNSPECIFIED BACK PAIN LATERALITY: ICD-10-CM

## 2025-07-11 DIAGNOSIS — F33.0 MILD EPISODE OF RECURRENT MAJOR DEPRESSIVE DISORDER: ICD-10-CM

## 2025-07-11 DIAGNOSIS — Z01.818 PREOPERATIVE CLEARANCE: Primary | ICD-10-CM

## 2025-07-11 DIAGNOSIS — D72.810 LYMPHOPENIA: ICD-10-CM

## 2025-07-11 DIAGNOSIS — R94.31 ABNORMAL EKG: ICD-10-CM

## 2025-07-11 PROCEDURE — 3044F HG A1C LEVEL LT 7.0%: CPT | Performed by: FAMILY MEDICINE

## 2025-07-11 PROCEDURE — 99214 OFFICE O/P EST MOD 30 MIN: CPT | Performed by: FAMILY MEDICINE

## 2025-07-11 RX ORDER — ASPIRIN 81 MG/1
81 TABLET ORAL DAILY
Qty: 90 TABLET | Refills: 3 | Status: SHIPPED | OUTPATIENT
Start: 2025-07-11

## 2025-07-11 SDOH — ECONOMIC STABILITY: FOOD INSECURITY: WITHIN THE PAST 12 MONTHS, THE FOOD YOU BOUGHT JUST DIDN'T LAST AND YOU DIDN'T HAVE MONEY TO GET MORE.: NEVER TRUE

## 2025-07-11 SDOH — ECONOMIC STABILITY: FOOD INSECURITY: WITHIN THE PAST 12 MONTHS, YOU WORRIED THAT YOUR FOOD WOULD RUN OUT BEFORE YOU GOT MONEY TO BUY MORE.: NEVER TRUE

## 2025-07-11 ASSESSMENT — PATIENT HEALTH QUESTIONNAIRE - PHQ9
7. TROUBLE CONCENTRATING ON THINGS, SUCH AS READING THE NEWSPAPER OR WATCHING TELEVISION: NOT AT ALL
3. TROUBLE FALLING OR STAYING ASLEEP: SEVERAL DAYS
10. IF YOU CHECKED OFF ANY PROBLEMS, HOW DIFFICULT HAVE THESE PROBLEMS MADE IT FOR YOU TO DO YOUR WORK, TAKE CARE OF THINGS AT HOME, OR GET ALONG WITH OTHER PEOPLE: NOT DIFFICULT AT ALL
2. FEELING DOWN, DEPRESSED OR HOPELESS: SEVERAL DAYS
8. MOVING OR SPEAKING SO SLOWLY THAT OTHER PEOPLE COULD HAVE NOTICED. OR THE OPPOSITE, BEING SO FIGETY OR RESTLESS THAT YOU HAVE BEEN MOVING AROUND A LOT MORE THAN USUAL: NOT AT ALL
5. POOR APPETITE OR OVEREATING: NOT AT ALL
6. FEELING BAD ABOUT YOURSELF - OR THAT YOU ARE A FAILURE OR HAVE LET YOURSELF OR YOUR FAMILY DOWN: NOT AT ALL
SUM OF ALL RESPONSES TO PHQ QUESTIONS 1-9: 6
SUM OF ALL RESPONSES TO PHQ QUESTIONS 1-9: 6
4. FEELING TIRED OR HAVING LITTLE ENERGY: NEARLY EVERY DAY
9. THOUGHTS THAT YOU WOULD BE BETTER OFF DEAD, OR OF HURTING YOURSELF: NOT AT ALL
1. LITTLE INTEREST OR PLEASURE IN DOING THINGS: SEVERAL DAYS
SUM OF ALL RESPONSES TO PHQ QUESTIONS 1-9: 6
SUM OF ALL RESPONSES TO PHQ QUESTIONS 1-9: 6

## 2025-07-11 ASSESSMENT — ENCOUNTER SYMPTOMS
SHORTNESS OF BREATH: 0
ABDOMINAL PAIN: 1

## 2025-07-11 NOTE — PROGRESS NOTES
Have you been to the ER, urgent care clinic since your last visit?  Hospitalized since your last visit?   NO    Have you seen or consulted any other health care providers outside our system since your last visit?   Yes- Saw neurosurgeon Dr. Nathan Lovell- follow up for spinal stimulator- and needs to be replaced.    “Have you had a diabetic eye exam?”    NO     No diabetic eye exam on file            
done    Diabetic retinal exam  Never done    Hepatitis C screen  Never done    Hepatitis B vaccine (1 of 3 - 19+ 3-dose series) Never done    Pneumococcal 50+ years Vaccine (2 of 2 - PCV) 01/01/2017    Diabetic foot exam  05/08/2020    COVID-19 Vaccine (4 - 2024-25 season) 09/01/2024    Shingles vaccine (1 of 2) 01/23/2027 (Originally 1/23/2017)    Flu vaccine (1) 08/01/2025    GFR test (Diabetes, CKD 3-4, OR last GFR 15-59)  11/06/2025    Depression Monitoring  05/19/2026    A1C test (Diabetic or Prediabetic)  07/09/2026    Diabetic Alb to Cr ratio (uACR) test  07/09/2026    Lipids  07/09/2026    DTaP/Tdap/Td vaccine (2 - Td or Tdap) 05/09/2035    Hepatitis A vaccine  Aged Out    Hib vaccine  Aged Out    Polio vaccine  Aged Out    Meningococcal (ACWY) vaccine  Aged Out    Meningococcal B vaccine  Aged Out    Pneumococcal 0-49 years Vaccine  Discontinued    Prostate Specific Antigen (PSA) Screening or Monitoring  Discontinued        Subjective     59 y/o male here for follow  States he stopped taking Farxiga regularly; states now using every 2 weeks; misses doses on his zetia as well and other meds; states depression is affecting him from taking his meds; when he is in pain, he doesn't feel like doing anything    Back neurostimulator removal scheduled 7/30/2025; not charging/migrated placement; pinching his back; had EKG done 2 days as preop; changes in Q waves noted compared to his 2019 EKG with us; denies chest pain, sob or palpitaitons    C/o L upper quadrant pain today; stopped his prilosec as well    Has dental caries--pain to tooth; had to reschedule due to migraines; scheduled for Wednesday    Labs 7/9/2025  Cbc low lymphocyte but improving/low wbc  Urine micro ratio 20  Lipid , HDL 51, LDL 86  TSH normal  UA +500 sugar, 3-5 RBC, wbc 3-5  A1c 5.4%  Labs obtained prior to visit? Yes  Reviewed with patient? yes      ROS:     Review of Systems   Respiratory:  Negative for shortness of breath.

## 2025-07-16 ENCOUNTER — TELEPHONE (OUTPATIENT)
Facility: CLINIC | Age: 60
End: 2025-07-16

## 2025-07-16 NOTE — TELEPHONE ENCOUNTER
Received [facility] Testing Referral Form; dated 7/11/2025; signed/faxed with confirmation receipt

## 2025-07-17 ENCOUNTER — TELEPHONE (OUTPATIENT)
Facility: CLINIC | Age: 60
End: 2025-07-17

## 2025-07-17 NOTE — TELEPHONE ENCOUNTER
Please send latest EKG from 7/9/2025 and 8/23/2019  No additional tests--this was related to asymptomatic finding for Q waves on prior EKG which was different from baseline  2. Pt unable to run due to chronic back pain condition; denies chest pain or shortness of breath; able to complete ADLs  3. EKG as above  4. Q waves on EKG, [place] risk >20% (24.5%), Hyperlipidemia associated with diabetes, type 2 diabetes  5. Pt has known hx of diabetes which puts him at high risk for cardiac event  Currently: diabetes is controlled 5.4%, Lipid LDL 86, mildly uncontrolled  6. Pt is unable to run on treadmill due to chronic back pain

## 2025-07-17 NOTE — TELEPHONE ENCOUNTER
Ms. Alford with [facility] stated that Patient's Nuclear Stress Test -authorization is pending.    Needs Peer to Peer  Contact [employer] at [phone]  Case# [id number]

## 2025-07-17 NOTE — TELEPHONE ENCOUNTER
Per [facility] [employer]    Office Notes need to include:    Prior Studies/ Results prior to Stress Test  2. Specific Functional Limitations of the Patient  3. Any recent ECG tracings?  4. Lab Reports to Support DX for Stress Test  5. Previous and Current TX for Condition  6. Patient's Details of Ability to Walk for the Stress Test    Fax Note to [phone]      Reference # [id number]

## 2025-07-23 ENCOUNTER — TELEPHONE (OUTPATIENT)
Age: 60
End: 2025-07-23

## 2025-07-23 NOTE — TELEPHONE ENCOUNTER
I called Frank Brownlee because he was scheduled for nuclear stress test on 7/24/25 at our [facility] location and unfortunately we had to  cancel it.  Frank was referred from [facility] and his order for the nuclear stress test was sent to [facility] and to our location.  When I spoke to Frank he stated that he declined the appointment with [facility] and scheduled with our location.  [facility] went ahead and started the authorization for the nuclear stress test, which meant I could not start the auth for the stress test.  My plan was to wait for the outcome and if approved change the location. The auth for the nuc from [facility] was not approved and they are requesting a peer to peer with his provider.  I advised him to speak with [facility]ing Pace's office and I gave him a number to give them to [employer] (the [employer] that handles the auth's) and hopefully and everything can be straighten out.

## 2025-08-12 ENCOUNTER — OFFICE VISIT (OUTPATIENT)
Facility: CLINIC | Age: 60
End: 2025-08-12
Payer: MEDICAID

## 2025-08-12 VITALS
RESPIRATION RATE: 16 BRPM | WEIGHT: 183.6 LBS | HEART RATE: 64 BPM | HEIGHT: 67 IN | BODY MASS INDEX: 28.82 KG/M2 | SYSTOLIC BLOOD PRESSURE: 129 MMHG | OXYGEN SATURATION: 96 % | DIASTOLIC BLOOD PRESSURE: 84 MMHG
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
## 2025-08-12 DIAGNOSIS — G89.29 CHRONIC BACK PAIN, UNSPECIFIED BACK LOCATION, UNSPECIFIED BACK PAIN LATERALITY: ICD-10-CM

## 2025-08-12 DIAGNOSIS — R94.31 ABNORMAL EKG: ICD-10-CM

## 2025-08-12 DIAGNOSIS — F33.41 RECURRENT MAJOR DEPRESSIVE DISORDER, IN PARTIAL REMISSION: ICD-10-CM

## 2025-08-12 DIAGNOSIS — M54.9 CHRONIC BACK PAIN, UNSPECIFIED BACK LOCATION, UNSPECIFIED BACK PAIN LATERALITY: ICD-10-CM

## 2025-08-12 DIAGNOSIS — L03.115 CELLULITIS OF RIGHT LOWER EXTREMITY: ICD-10-CM

## 2025-08-12 PROCEDURE — 99214 OFFICE O/P EST MOD 30 MIN: CPT | Performed by: FAMILY MEDICINE

## 2025-08-12 RX ORDER — DOXYCYCLINE HYCLATE 100 MG
100 TABLET ORAL 2 TIMES DAILY
Qty: 20 TABLET | Refills: 0 | Status: SHIPPED | OUTPATIENT
Start: 2025-08-12 | End: 2025-08-22

## 2025-08-12 SDOH — ECONOMIC STABILITY: FOOD INSECURITY: WITHIN THE PAST 12 MONTHS, THE FOOD YOU BOUGHT JUST DIDN'T LAST AND YOU DIDN'T HAVE MONEY TO GET MORE.: NEVER TRUE

## 2025-08-12 SDOH — ECONOMIC STABILITY: FOOD INSECURITY: WITHIN THE PAST 12 MONTHS, YOU WORRIED THAT YOUR FOOD WOULD RUN OUT BEFORE YOU GOT MONEY TO BUY MORE.: NEVER TRUE

## 2025-08-12 ASSESSMENT — PATIENT HEALTH QUESTIONNAIRE - PHQ9
1. LITTLE INTEREST OR PLEASURE IN DOING THINGS: NOT AT ALL
2. FEELING DOWN, DEPRESSED OR HOPELESS: NOT AT ALL
SUM OF ALL RESPONSES TO PHQ QUESTIONS 1-9: 0

## 2025-08-12 ASSESSMENT — ENCOUNTER SYMPTOMS: SHORTNESS OF BREATH: 0

## (undated) DEVICE — SUTURE MCRYL SZ 4-0 L27IN ABSRB UD L24MM PS-1 3/8 CIR PRIM Y935H

## (undated) DEVICE — STAPLE INT WHT BLU G GRN BLK REINF FOR ENDOPATH ECHELON FLX

## (undated) DEVICE — KIT CATH OD16FR 5ML BLLN SIL URIN INDWL STR TIP INF CTRL

## (undated) DEVICE — GLOVE SURG SZ 7.5 L11.73IN FNGR THK7.5MIL STRW LTX POLYMER

## (undated) DEVICE — RELOAD STPL L60MM H1.5-3.6MM REG TISS BLU GRIPPING SURF B

## (undated) DEVICE — REM POLYHESIVE ADULT PATIENT RETURN ELECTRODE: Brand: VALLEYLAB

## (undated) DEVICE — UNIVERSAL FIXATION CANNULA: Brand: VERSAONE

## (undated) DEVICE — TROCAR ENDOSCP L100MM DIA12MM STBL SL BLDELSS ENDOPATH XCEL

## (undated) DEVICE — SUTURE VCRL SZ 2-0 L54IN ABSRB VLT W/O NDL POLYGLACTIN 910 J618H

## (undated) DEVICE — TRUE CONTENT TO BE POPULATED AS PART OF REBRANDING: Brand: ARGYLE

## (undated) DEVICE — SUT SLK 2-0SH 30IN BLK --

## (undated) DEVICE — RELOAD STPL L60MM H1-2.6MM MESENTERY THN TISS WHT 6 ROW

## (undated) DEVICE — INTENDED FOR TISSUE SEPARATION, AND OTHER PROCEDURES THAT REQUIRE A SHARP SURGICAL BLADE TO PUNCTURE OR CUT.: Brand: BARD-PARKER ® CARBON RIB-BACK BLADES

## (undated) DEVICE — LIGHT HANDLE: Brand: DEVON

## (undated) DEVICE — TROCAR LAP L100MM DIA5MM BLDELSS W/ STBL SL ENDOPATH XCEL

## (undated) DEVICE — TROCAR ENDOSCP SHFT L100MM DIA12MM INTEGR STBL ENDOPATH

## (undated) DEVICE — KENDALL SCD EXPRESS SLEEVES, KNEE LENGTH, MEDIUM: Brand: KENDALL SCD

## (undated) DEVICE — DISPOSABLE SUCTION/IRRIGATOR TUBE SET WITH TIP: Brand: AHTO

## (undated) DEVICE — APPLICATOR BNDG 1MM ADH PREMIERPRO EXOFIN

## (undated) DEVICE — PREP SKN CHLORHEX GLUC 8OZ --

## (undated) DEVICE — SHEAR HARMONIC ACET 5MMX36CM -- ACE PLUS

## (undated) DEVICE — PACK PROCEDURE SURG LAPAROSCOPY 17X7 MM BRTRC PRIMUS

## (undated) DEVICE — STAPLER SKIN L440MM 32MM LNG 12 FIRING B FRM PWR + GRIPPING

## (undated) DEVICE — SHEARS: Brand: ENDO SHEARS

## (undated) DEVICE — VISUALIZATION SYSTEM: Brand: CLEARIFY

## (undated) DEVICE — SUTURE VCRL SZ 3-0 L27IN ABSRB VLT L26MM SH 1/2 CIR J316H

## (undated) DEVICE — SOL IRR STRL H2O 500ML STRL --